# Patient Record
Sex: FEMALE | Race: BLACK OR AFRICAN AMERICAN | NOT HISPANIC OR LATINO
[De-identification: names, ages, dates, MRNs, and addresses within clinical notes are randomized per-mention and may not be internally consistent; named-entity substitution may affect disease eponyms.]

---

## 2017-11-27 ENCOUNTER — TRANSCRIPTION ENCOUNTER (OUTPATIENT)
Age: 27
End: 2017-11-27

## 2018-06-29 ENCOUNTER — APPOINTMENT (OUTPATIENT)
Dept: OBGYN | Facility: CLINIC | Age: 28
End: 2018-06-29

## 2018-07-06 ENCOUNTER — APPOINTMENT (OUTPATIENT)
Dept: OBGYN | Facility: CLINIC | Age: 28
End: 2018-07-06
Payer: COMMERCIAL

## 2018-07-06 VITALS
WEIGHT: 228 LBS | DIASTOLIC BLOOD PRESSURE: 80 MMHG | BODY MASS INDEX: 36.64 KG/M2 | SYSTOLIC BLOOD PRESSURE: 120 MMHG | HEIGHT: 66 IN

## 2018-07-06 DIAGNOSIS — Z78.9 OTHER SPECIFIED HEALTH STATUS: ICD-10-CM

## 2018-07-06 DIAGNOSIS — Z80.3 FAMILY HISTORY OF MALIGNANT NEOPLASM OF BREAST: ICD-10-CM

## 2018-07-06 DIAGNOSIS — Z80.42 FAMILY HISTORY OF MALIGNANT NEOPLASM OF PROSTATE: ICD-10-CM

## 2018-07-06 PROCEDURE — 99385 PREV VISIT NEW AGE 18-39: CPT | Mod: 25

## 2018-07-06 PROCEDURE — 76815 OB US LIMITED FETUS(S): CPT

## 2018-07-09 LAB
C TRACH RRNA SPEC QL NAA+PROBE: NOT DETECTED
CANDIDA VAG CYTO: NOT DETECTED
G VAGINALIS+PREV SP MTYP VAG QL MICRO: NOT DETECTED
HPV HIGH+LOW RISK DNA PNL CVX: NOT DETECTED
N GONORRHOEA RRNA SPEC QL NAA+PROBE: NOT DETECTED
SOURCE AMPLIFICATION: NORMAL
T VAGINALIS VAG QL WET PREP: NOT DETECTED

## 2018-07-12 LAB — CYTOLOGY CVX/VAG DOC THIN PREP: NORMAL

## 2018-07-20 ENCOUNTER — APPOINTMENT (OUTPATIENT)
Dept: OBGYN | Facility: CLINIC | Age: 28
End: 2018-07-20
Payer: COMMERCIAL

## 2018-07-20 VITALS
DIASTOLIC BLOOD PRESSURE: 80 MMHG | HEIGHT: 66 IN | WEIGHT: 229 LBS | BODY MASS INDEX: 36.8 KG/M2 | SYSTOLIC BLOOD PRESSURE: 102 MMHG

## 2018-07-20 PROCEDURE — 0501F PRENATAL FLOW SHEET: CPT

## 2018-07-23 LAB
ABO + RH PNL BLD: NORMAL
BACTERIA UR CULT: NORMAL
BASOPHILS # BLD AUTO: 0.02 K/UL
BASOPHILS NFR BLD AUTO: 0.2 %
BLD GP AB SCN SERPL QL: NORMAL
CMV IGG SERPL QL: 8.6 U/ML
CMV IGG SERPL-IMP: POSITIVE
CMV IGM SERPL QL: <8 AU/ML
CMV IGM SERPL QL: NEGATIVE
EOSINOPHIL # BLD AUTO: 0.11 K/UL
EOSINOPHIL NFR BLD AUTO: 1.3 %
HBV SURFACE AG SER QL: NONREACTIVE
HCT VFR BLD CALC: 38.3 %
HCV AB SER QL: NONREACTIVE
HCV S/CO RATIO: 0.09 S/CO
HGB BLD-MCNC: 10.9 G/DL
HIV1+2 AB SPEC QL IA.RAPID: NONREACTIVE
IMM GRANULOCYTES NFR BLD AUTO: 0.6 %
LEAD BLD-MCNC: <1 UG/DL
LYMPHOCYTES # BLD AUTO: 2 K/UL
LYMPHOCYTES NFR BLD AUTO: 23 %
M TB IFN-G BLD-IMP: NEGATIVE
MAN DIFF?: NORMAL
MCHC RBC-ENTMCNC: 26 PG
MCHC RBC-ENTMCNC: 28.5 GM/DL
MCV RBC AUTO: 91.2 FL
MONOCYTES # BLD AUTO: 0.64 K/UL
MONOCYTES NFR BLD AUTO: 7.4 %
NEUTROPHILS # BLD AUTO: 5.88 K/UL
NEUTROPHILS NFR BLD AUTO: 67.5 %
PLATELET # BLD AUTO: 193 K/UL
QUANTIFERON TB PLUS MITOGEN MINUS NIL: >10 IU/ML
QUANTIFERON TB PLUS NIL: 0.02 IU/ML
QUANTIFERON TB PLUS TB1 MINUS NIL: 0 IU/ML
QUANTIFERON TB PLUS TB2 MINUS NIL: 0 IU/ML
RBC # BLD: 4.2 M/UL
RBC # FLD: 12.9 %
RUBV IGG FLD-ACNC: 5.3 INDEX
RUBV IGG SER-IMP: POSITIVE
TSH SERPL-ACNC: 0.73 UIU/ML
VZV AB TITR SER: POSITIVE
VZV IGG SER IF-ACNC: 3010 INDEX
WBC # FLD AUTO: 8.7 K/UL

## 2018-07-25 LAB
B19V IGG SER QL IA: 7.3 INDEX
B19V IGG+IGM SER-IMP: NORMAL
B19V IGG+IGM SER-IMP: POSITIVE
B19V IGM FLD-ACNC: 0.3 INDEX
B19V IGM SER-ACNC: NEGATIVE
HGB A MFR BLD: 97.3 %
HGB A2 MFR BLD: 2.7 %
HGB FRACT BLD-IMP: NORMAL

## 2018-07-28 ENCOUNTER — TRANSCRIPTION ENCOUNTER (OUTPATIENT)
Age: 28
End: 2018-07-28

## 2018-07-31 LAB
AR GENE MUT ANL BLD/T: NEGATIVE
CFTR MUT TESTED BLD/T: NEGATIVE
FMR1 GENE MUT ANL BLD/T: NORMAL

## 2018-08-02 ENCOUNTER — LABORATORY RESULT (OUTPATIENT)
Age: 28
End: 2018-08-02

## 2018-08-02 ENCOUNTER — APPOINTMENT (OUTPATIENT)
Dept: ANTEPARTUM | Facility: CLINIC | Age: 28
End: 2018-08-02
Payer: COMMERCIAL

## 2018-08-02 ENCOUNTER — ASOB RESULT (OUTPATIENT)
Age: 28
End: 2018-08-02

## 2018-08-02 PROCEDURE — 36416 COLLJ CAPILLARY BLOOD SPEC: CPT

## 2018-08-02 PROCEDURE — 76813 OB US NUCHAL MEAS 1 GEST: CPT

## 2018-08-02 PROCEDURE — 76801 OB US < 14 WKS SINGLE FETUS: CPT

## 2018-08-17 PROBLEM — N94.89 SUPPRESSION OF MENSES: Status: RESOLVED | Noted: 2018-07-06 | Resolved: 2018-08-17

## 2018-08-17 PROBLEM — Z01.419 ENCOUNTER FOR CERVICAL PAP SMEAR WITH PELVIC EXAM: Status: RESOLVED | Noted: 2018-07-06 | Resolved: 2018-08-17

## 2018-08-17 PROBLEM — Z34.03 ENCOUNTER FOR PRENATAL CARE IN THIRD TRIMESTER OF FIRST PREGNANCY: Status: RESOLVED | Noted: 2018-07-20 | Resolved: 2018-08-17

## 2018-08-20 ENCOUNTER — APPOINTMENT (OUTPATIENT)
Dept: OBGYN | Facility: CLINIC | Age: 28
End: 2018-08-20
Payer: COMMERCIAL

## 2018-08-20 VITALS
DIASTOLIC BLOOD PRESSURE: 76 MMHG | HEIGHT: 63.5 IN | BODY MASS INDEX: 40.6 KG/M2 | WEIGHT: 232 LBS | SYSTOLIC BLOOD PRESSURE: 110 MMHG

## 2018-08-20 DIAGNOSIS — Z34.01 ENCOUNTER FOR SUPERVISION OF NORMAL FIRST PREGNANCY, FIRST TRIMESTER: ICD-10-CM

## 2018-08-20 DIAGNOSIS — Z01.419 ENCOUNTER FOR GYNECOLOGICAL EXAMINATION (GENERAL) (ROUTINE) W/OUT ABNORMAL FINDINGS: ICD-10-CM

## 2018-08-20 DIAGNOSIS — N94.89 OTHER SPECIFIED CONDITIONS ASSOCIATED WITH FEMALE GENITAL ORGANS AND MENSTRUAL CYCLE: ICD-10-CM

## 2018-08-20 DIAGNOSIS — Z34.03 ENCOUNTER FOR SUPERVISION OF NORMAL FIRST PREGNANCY, THIRD TRIMESTER: ICD-10-CM

## 2018-08-20 PROCEDURE — 0502F SUBSEQUENT PRENATAL CARE: CPT

## 2018-08-23 LAB
1ST TRIMESTER DATA: NORMAL
2ND TRIMESTER DATA: NORMAL
AFP PNL SERPL: NORMAL
AFP SERPL-ACNC: NORMAL
AFP SERPL-ACNC: NORMAL
B-HCG FREE SERPL-MCNC: NORMAL
CLINICAL BIOCHEMIST REVIEW: NORMAL
FREE BETA HCG 1ST TRIMESTER: NORMAL
INHIBIN A SERPL-MCNC: NORMAL
NOTES NTD: NORMAL
NT: NORMAL
PAPP-A SERPL-ACNC: NORMAL
U ESTRIOL SERPL-SCNC: NORMAL

## 2018-09-20 ENCOUNTER — APPOINTMENT (OUTPATIENT)
Dept: OBGYN | Facility: CLINIC | Age: 28
End: 2018-09-20
Payer: COMMERCIAL

## 2018-09-20 VITALS — WEIGHT: 234 LBS | SYSTOLIC BLOOD PRESSURE: 110 MMHG | DIASTOLIC BLOOD PRESSURE: 70 MMHG | BODY MASS INDEX: 40.8 KG/M2

## 2018-09-20 PROCEDURE — 0502F SUBSEQUENT PRENATAL CARE: CPT

## 2018-09-26 ENCOUNTER — ASOB RESULT (OUTPATIENT)
Age: 28
End: 2018-09-26

## 2018-09-26 ENCOUNTER — APPOINTMENT (OUTPATIENT)
Dept: ANTEPARTUM | Facility: CLINIC | Age: 28
End: 2018-09-26
Payer: COMMERCIAL

## 2018-09-26 PROCEDURE — 76817 TRANSVAGINAL US OBSTETRIC: CPT | Mod: 59

## 2018-09-26 PROCEDURE — 76811 OB US DETAILED SNGL FETUS: CPT

## 2018-10-18 ENCOUNTER — APPOINTMENT (OUTPATIENT)
Dept: OBGYN | Facility: CLINIC | Age: 28
End: 2018-10-18
Payer: COMMERCIAL

## 2018-10-18 VITALS
BODY MASS INDEX: 41.12 KG/M2 | DIASTOLIC BLOOD PRESSURE: 60 MMHG | HEIGHT: 63.5 IN | WEIGHT: 235 LBS | SYSTOLIC BLOOD PRESSURE: 120 MMHG

## 2018-10-18 PROCEDURE — 0502F SUBSEQUENT PRENATAL CARE: CPT

## 2018-11-07 ENCOUNTER — OUTPATIENT (OUTPATIENT)
Dept: OUTPATIENT SERVICES | Facility: HOSPITAL | Age: 28
LOS: 1 days | End: 2018-11-07
Payer: COMMERCIAL

## 2018-11-07 ENCOUNTER — EMERGENCY (EMERGENCY)
Facility: HOSPITAL | Age: 28
LOS: 0 days | Discharge: ANOTHER HOSPITAL | End: 2018-11-07
Attending: EMERGENCY MEDICINE
Payer: COMMERCIAL

## 2018-11-07 VITALS
TEMPERATURE: 99 F | HEART RATE: 112 BPM | OXYGEN SATURATION: 97 % | HEIGHT: 64 IN | SYSTOLIC BLOOD PRESSURE: 119 MMHG | RESPIRATION RATE: 18 BRPM | DIASTOLIC BLOOD PRESSURE: 78 MMHG | WEIGHT: 229.94 LBS

## 2018-11-07 DIAGNOSIS — Z3A.00 WEEKS OF GESTATION OF PREGNANCY NOT SPECIFIED: ICD-10-CM

## 2018-11-07 DIAGNOSIS — O26.899 OTHER SPECIFIED PREGNANCY RELATED CONDITIONS, UNSPECIFIED TRIMESTER: ICD-10-CM

## 2018-11-07 LAB
ALBUMIN SERPL ELPH-MCNC: 2.9 G/DL — LOW (ref 3.3–5)
ALP SERPL-CCNC: 141 U/L — HIGH (ref 40–120)
ALT FLD-CCNC: 45 U/L — SIGNIFICANT CHANGE UP (ref 12–78)
ANION GAP SERPL CALC-SCNC: 11 MMOL/L — SIGNIFICANT CHANGE UP (ref 5–17)
APPEARANCE UR: CLEAR — SIGNIFICANT CHANGE UP
APTT BLD: 28 SEC — LOW (ref 28.5–37)
AST SERPL-CCNC: 17 U/L — SIGNIFICANT CHANGE UP (ref 15–37)
BACTERIA # UR AUTO: ABNORMAL
BASOPHILS # BLD AUTO: 0.04 K/UL — SIGNIFICANT CHANGE UP (ref 0–0.2)
BASOPHILS NFR BLD AUTO: 0.3 % — SIGNIFICANT CHANGE UP (ref 0–2)
BILIRUB SERPL-MCNC: 0.3 MG/DL — SIGNIFICANT CHANGE UP (ref 0.2–1.2)
BILIRUB UR-MCNC: NEGATIVE — SIGNIFICANT CHANGE UP
BUN SERPL-MCNC: 7 MG/DL — SIGNIFICANT CHANGE UP (ref 7–23)
CALCIUM SERPL-MCNC: 9 MG/DL — SIGNIFICANT CHANGE UP (ref 8.5–10.1)
CHLORIDE SERPL-SCNC: 109 MMOL/L — HIGH (ref 96–108)
CO2 SERPL-SCNC: 21 MMOL/L — LOW (ref 22–31)
COLOR SPEC: YELLOW — SIGNIFICANT CHANGE UP
CREAT SERPL-MCNC: 0.53 MG/DL — SIGNIFICANT CHANGE UP (ref 0.5–1.3)
DIFF PNL FLD: NEGATIVE — SIGNIFICANT CHANGE UP
EOSINOPHIL # BLD AUTO: 0.16 K/UL — SIGNIFICANT CHANGE UP (ref 0–0.5)
EOSINOPHIL NFR BLD AUTO: 1.1 % — SIGNIFICANT CHANGE UP (ref 0–6)
EPI CELLS # UR: ABNORMAL
GLUCOSE SERPL-MCNC: 96 MG/DL — SIGNIFICANT CHANGE UP (ref 70–99)
GLUCOSE UR QL: NEGATIVE MG/DL — SIGNIFICANT CHANGE UP
HCG SERPL-ACNC: 6289 MIU/ML — HIGH
HCT VFR BLD CALC: 32.6 % — LOW (ref 34.5–45)
HGB BLD-MCNC: 11 G/DL — LOW (ref 11.5–15.5)
IMM GRANULOCYTES NFR BLD AUTO: 1.2 % — SIGNIFICANT CHANGE UP (ref 0–1.5)
INR BLD: 0.98 RATIO — SIGNIFICANT CHANGE UP (ref 0.88–1.16)
KETONES UR-MCNC: ABNORMAL
LEUKOCYTE ESTERASE UR-ACNC: NEGATIVE — SIGNIFICANT CHANGE UP
LIDOCAIN IGE QN: 173 U/L — SIGNIFICANT CHANGE UP (ref 73–393)
LYMPHOCYTES # BLD AUTO: 14.6 % — SIGNIFICANT CHANGE UP (ref 13–44)
LYMPHOCYTES # BLD AUTO: 2.05 K/UL — SIGNIFICANT CHANGE UP (ref 1–3.3)
MCHC RBC-ENTMCNC: 30.6 PG — SIGNIFICANT CHANGE UP (ref 27–34)
MCHC RBC-ENTMCNC: 33.7 GM/DL — SIGNIFICANT CHANGE UP (ref 32–36)
MCV RBC AUTO: 90.8 FL — SIGNIFICANT CHANGE UP (ref 80–100)
MONOCYTES # BLD AUTO: 0.93 K/UL — HIGH (ref 0–0.9)
MONOCYTES NFR BLD AUTO: 6.6 % — SIGNIFICANT CHANGE UP (ref 2–14)
NEUTROPHILS # BLD AUTO: 10.69 K/UL — HIGH (ref 1.8–7.4)
NEUTROPHILS NFR BLD AUTO: 76.2 % — SIGNIFICANT CHANGE UP (ref 43–77)
NITRITE UR-MCNC: NEGATIVE — SIGNIFICANT CHANGE UP
NRBC # BLD: 0 /100 WBCS — SIGNIFICANT CHANGE UP (ref 0–0)
PH UR: 6.5 — SIGNIFICANT CHANGE UP (ref 5–8)
PLATELET # BLD AUTO: 267 K/UL — SIGNIFICANT CHANGE UP (ref 150–400)
POTASSIUM SERPL-MCNC: 3.6 MMOL/L — SIGNIFICANT CHANGE UP (ref 3.5–5.3)
POTASSIUM SERPL-SCNC: 3.6 MMOL/L — SIGNIFICANT CHANGE UP (ref 3.5–5.3)
PROT SERPL-MCNC: 7.3 GM/DL — SIGNIFICANT CHANGE UP (ref 6–8.3)
PROT UR-MCNC: 15 MG/DL
PROTHROM AB SERPL-ACNC: 11 SEC — SIGNIFICANT CHANGE UP (ref 10–12.9)
RBC # BLD: 3.59 M/UL — LOW (ref 3.8–5.2)
RBC # FLD: 12.7 % — SIGNIFICANT CHANGE UP (ref 10.3–14.5)
SODIUM SERPL-SCNC: 141 MMOL/L — SIGNIFICANT CHANGE UP (ref 135–145)
SP GR SPEC: 1.01 — SIGNIFICANT CHANGE UP (ref 1.01–1.02)
UROBILINOGEN FLD QL: NEGATIVE MG/DL — SIGNIFICANT CHANGE UP
WBC # BLD: 14.04 K/UL — HIGH (ref 3.8–10.5)
WBC # FLD AUTO: 14.04 K/UL — HIGH (ref 3.8–10.5)
WBC UR QL: SIGNIFICANT CHANGE UP

## 2018-11-07 PROCEDURE — G0463: CPT

## 2018-11-07 PROCEDURE — 99285 EMERGENCY DEPT VISIT HI MDM: CPT | Mod: 25

## 2018-11-07 PROCEDURE — 59025 FETAL NON-STRESS TEST: CPT | Mod: 26

## 2018-11-07 PROCEDURE — 59025 FETAL NON-STRESS TEST: CPT

## 2018-11-07 NOTE — ED PROVIDER NOTE - MEDICAL DECISION MAKING DETAILS
Pt w abd pain in pregnancy, concern for early labor, called transfer center, discussed w Ob, Dr. Juan Elizondo accepting pt transfer to L+D for eval.

## 2018-11-07 NOTE — ED ADULT NURSE NOTE - NS PRO AD BILL OF RIGHTS
Yes
GOAL: Pt will improve static/dynamic standing balance by 1/2 grade to improve level of independence with mobility skills and ADLs in 2 weeks.

## 2018-11-07 NOTE — ED PROVIDER NOTE - PHYSICAL EXAMINATION
Gen: Alert, NAD  Head: NC, AT, EOMI, normal lids/conjunctiva  ENT: normal hearing, patent oropharynx, MMM  Neck: supple, no tenderness/meningismus, FROM, Trachea midline  Pulm: Bilateral clear BS, normal resp effort, no wheeze/stridor/retractions  CV: RRR, no M/R/G, +dist pulses  Abd: gravid, ND, +BS, no guarding/rebound tenderness, bedside sono: +fetal movement,   Mskel: no edema/erythema/cyanosis  Skin: no rash  Neuro: no sensory/motor deficits Gen: Alert, NAD  Head: NC, AT, EOMI, normal lids/conjunctiva  ENT: normal hearing, patent oropharynx, MMM  Neck: supple, no tenderness/meningismus, FROM, Trachea midline  Pulm: Bilateral clear BS, normal resp effort, no wheeze/stridor/retractions  CV: RRR, no M/R/G, +dist pulses  Abd: gravid, ND, +BS, no guarding/rebound tenderness, bedside sono: +fetal movement, -133  Mskel: no edema/erythema/cyanosis  Skin: no rash  Neuro: no sensory/motor deficits

## 2018-11-07 NOTE — ED ADULT NURSE NOTE - OBJECTIVE STATEMENT
Patient presents in ED G2 , 26/40 gestation , complaining of generalized abdominal cramping 9/10, lasting a few seconds. pains intermittent. Patient confirms that she is getting frequent fetal movements. Patient vomited x 2 times. Denies any discharge.

## 2018-11-07 NOTE — ED PROVIDER NOTE - OBJECTIVE STATEMENT
Pertinent PMH/PSH/FHx/SHx and Review of Systems contained within:    27yo F w PMH of asthma, no PSH,  about 26wks preg (MARLIN 19) presents to ED for eval of cramping abd pain.  Pt states she noted sx from about 11pm, initially upper abd pain, now c/o lower cramping.  Denies trauma, fever, chills, CP, SOB, vaginal discharge/bleeding.  Ob- Dr. Henriquez    No fever/chills, No photophobia/eye pain/changes in vision, No ear pain/sore throat/dysphagia, No chest pain/palpitations, no SOB/cough/wheeze/stridor, +abdominal pain, no dysuria/frequency/discharge, No neck/back pain, no rash, no changes in neurological status/function.

## 2018-11-08 DIAGNOSIS — O99.519 DISEASES OF THE RESPIRATORY SYSTEM COMPLICATING PREGNANCY, UNSPECIFIED TRIMESTER: ICD-10-CM

## 2018-11-08 DIAGNOSIS — O99.89 OTHER SPECIFIED DISEASES AND CONDITIONS COMPLICATING PREGNANCY, CHILDBIRTH AND THE PUERPERIUM: ICD-10-CM

## 2018-11-08 DIAGNOSIS — Z3A.00 WEEKS OF GESTATION OF PREGNANCY NOT SPECIFIED: ICD-10-CM

## 2018-11-08 DIAGNOSIS — J45.909 UNSPECIFIED ASTHMA, UNCOMPLICATED: ICD-10-CM

## 2018-11-08 DIAGNOSIS — R10.9 UNSPECIFIED ABDOMINAL PAIN: ICD-10-CM

## 2018-11-08 LAB
CULTURE RESULTS: SIGNIFICANT CHANGE UP
SPECIMEN SOURCE: SIGNIFICANT CHANGE UP

## 2018-11-12 ENCOUNTER — APPOINTMENT (OUTPATIENT)
Dept: OBGYN | Facility: CLINIC | Age: 28
End: 2018-11-12

## 2018-11-12 PROBLEM — J45.909 UNSPECIFIED ASTHMA, UNCOMPLICATED: Chronic | Status: ACTIVE | Noted: 2018-11-07

## 2018-11-13 ENCOUNTER — APPOINTMENT (OUTPATIENT)
Dept: OBGYN | Facility: CLINIC | Age: 28
End: 2018-11-13
Payer: COMMERCIAL

## 2018-11-13 ENCOUNTER — NON-APPOINTMENT (OUTPATIENT)
Age: 28
End: 2018-11-13

## 2018-11-13 VITALS
HEIGHT: 63.5 IN | DIASTOLIC BLOOD PRESSURE: 78 MMHG | SYSTOLIC BLOOD PRESSURE: 119 MMHG | BODY MASS INDEX: 42 KG/M2 | WEIGHT: 240 LBS

## 2018-11-13 PROCEDURE — 0502F SUBSEQUENT PRENATAL CARE: CPT

## 2018-11-14 LAB
BASOPHILS # BLD AUTO: 0.01 K/UL
BASOPHILS NFR BLD AUTO: 0.1 %
EOSINOPHIL # BLD AUTO: 0.21 K/UL
EOSINOPHIL NFR BLD AUTO: 1.8 %
GLUCOSE 1H P 50 G GLC PO SERPL-MCNC: 123 MG/DL
HCT VFR BLD CALC: 34.3 %
HGB BLD-MCNC: 11.3 G/DL
HIV1+2 AB SPEC QL IA.RAPID: NONREACTIVE
IMM GRANULOCYTES NFR BLD AUTO: 1.2 %
LYMPHOCYTES # BLD AUTO: 2.17 K/UL
LYMPHOCYTES NFR BLD AUTO: 18.1 %
MAN DIFF?: NORMAL
MCHC RBC-ENTMCNC: 31 PG
MCHC RBC-ENTMCNC: 32.9 GM/DL
MCV RBC AUTO: 94.2 FL
MONOCYTES # BLD AUTO: 0.54 K/UL
MONOCYTES NFR BLD AUTO: 4.5 %
NEUTROPHILS # BLD AUTO: 8.9 K/UL
NEUTROPHILS NFR BLD AUTO: 74.3 %
PLATELET # BLD AUTO: 277 K/UL
RBC # BLD: 3.64 M/UL
RBC # FLD: 13.5 %
WBC # FLD AUTO: 11.97 K/UL

## 2018-11-20 ENCOUNTER — APPOINTMENT (OUTPATIENT)
Dept: ANTEPARTUM | Facility: CLINIC | Age: 28
End: 2018-11-20
Payer: COMMERCIAL

## 2018-11-20 ENCOUNTER — ASOB RESULT (OUTPATIENT)
Age: 28
End: 2018-11-20

## 2018-11-20 PROCEDURE — 76816 OB US FOLLOW-UP PER FETUS: CPT

## 2018-11-27 ENCOUNTER — CLINICAL ADVICE (OUTPATIENT)
Age: 28
End: 2018-11-27

## 2018-11-28 ENCOUNTER — CLINICAL ADVICE (OUTPATIENT)
Age: 28
End: 2018-11-28

## 2018-11-28 ENCOUNTER — OUTPATIENT (OUTPATIENT)
Dept: OUTPATIENT SERVICES | Facility: HOSPITAL | Age: 28
LOS: 1 days | End: 2018-11-28
Payer: COMMERCIAL

## 2018-11-28 DIAGNOSIS — Z3A.00 WEEKS OF GESTATION OF PREGNANCY NOT SPECIFIED: ICD-10-CM

## 2018-11-28 DIAGNOSIS — O26.899 OTHER SPECIFIED PREGNANCY RELATED CONDITIONS, UNSPECIFIED TRIMESTER: ICD-10-CM

## 2018-11-28 PROCEDURE — 59025 FETAL NON-STRESS TEST: CPT | Mod: 26

## 2018-11-28 PROCEDURE — 59025 FETAL NON-STRESS TEST: CPT

## 2018-11-28 PROCEDURE — G0463: CPT

## 2018-11-28 RX ORDER — MAGNESIUM SULFATE 500 MG/ML
1 VIAL (ML) INJECTION ONCE
Qty: 0 | Refills: 0 | Status: DISCONTINUED | OUTPATIENT
Start: 2018-11-28 | End: 2018-12-13

## 2018-12-09 PROBLEM — Z34.02 ENCOUNTER FOR PRENATAL CARE IN SECOND TRIMESTER OF FIRST PREGNANCY: Status: RESOLVED | Noted: 2018-07-20 | Resolved: 2018-12-09

## 2018-12-10 ENCOUNTER — APPOINTMENT (OUTPATIENT)
Dept: OBGYN | Facility: CLINIC | Age: 28
End: 2018-12-10
Payer: COMMERCIAL

## 2018-12-10 ENCOUNTER — MED ADMIN CHARGE (OUTPATIENT)
Age: 28
End: 2018-12-10

## 2018-12-10 VITALS
BODY MASS INDEX: 42.19 KG/M2 | HEIGHT: 63.5 IN | DIASTOLIC BLOOD PRESSURE: 69 MMHG | SYSTOLIC BLOOD PRESSURE: 129 MMHG | WEIGHT: 241.13 LBS

## 2018-12-10 DIAGNOSIS — Z34.02 ENCOUNTER FOR SUPERVISION OF NORMAL FIRST PREGNANCY, SECOND TRIMESTER: ICD-10-CM

## 2018-12-10 LAB
URINE ALBUMIN/PROTEIN: NORMAL
URINE GLUCOSE: NORMAL
URINE KETONES: NORMAL

## 2018-12-10 PROCEDURE — 90471 IMMUNIZATION ADMIN: CPT

## 2018-12-10 PROCEDURE — 0502F SUBSEQUENT PRENATAL CARE: CPT

## 2018-12-10 PROCEDURE — 90715 TDAP VACCINE 7 YRS/> IM: CPT

## 2018-12-12 LAB
CANDIDA VAG CYTO: NOT DETECTED
G VAGINALIS+PREV SP MTYP VAG QL MICRO: NOT DETECTED
T VAGINALIS VAG QL WET PREP: NOT DETECTED

## 2018-12-13 ENCOUNTER — TRANSCRIPTION ENCOUNTER (OUTPATIENT)
Age: 28
End: 2018-12-13

## 2018-12-24 ENCOUNTER — APPOINTMENT (OUTPATIENT)
Dept: OBGYN | Facility: CLINIC | Age: 28
End: 2018-12-24
Payer: COMMERCIAL

## 2018-12-24 VITALS
HEIGHT: 63.5 IN | DIASTOLIC BLOOD PRESSURE: 85 MMHG | BODY MASS INDEX: 42.7 KG/M2 | SYSTOLIC BLOOD PRESSURE: 138 MMHG | WEIGHT: 244 LBS

## 2018-12-24 PROCEDURE — 0502F SUBSEQUENT PRENATAL CARE: CPT

## 2018-12-31 ENCOUNTER — NON-APPOINTMENT (OUTPATIENT)
Age: 28
End: 2018-12-31

## 2018-12-31 ENCOUNTER — APPOINTMENT (OUTPATIENT)
Dept: OBGYN | Facility: CLINIC | Age: 28
End: 2018-12-31
Payer: COMMERCIAL

## 2018-12-31 VITALS
DIASTOLIC BLOOD PRESSURE: 70 MMHG | WEIGHT: 244 LBS | SYSTOLIC BLOOD PRESSURE: 132 MMHG | HEIGHT: 63.5 IN | BODY MASS INDEX: 42.7 KG/M2

## 2018-12-31 PROCEDURE — 0502F SUBSEQUENT PRENATAL CARE: CPT

## 2019-01-07 ENCOUNTER — CLINICAL ADVICE (OUTPATIENT)
Age: 29
End: 2019-01-07

## 2019-01-14 ENCOUNTER — INPATIENT (INPATIENT)
Facility: HOSPITAL | Age: 29
LOS: 5 days | Discharge: HOME | End: 2019-01-20
Attending: OBSTETRICS & GYNECOLOGY | Admitting: OBSTETRICS & GYNECOLOGY
Payer: COMMERCIAL

## 2019-01-14 ENCOUNTER — TRANSCRIPTION ENCOUNTER (OUTPATIENT)
Age: 29
End: 2019-01-14

## 2019-01-14 ENCOUNTER — OTHER (OUTPATIENT)
Age: 29
End: 2019-01-14

## 2019-01-14 VITALS — WEIGHT: 293 LBS | HEIGHT: 63 IN

## 2019-01-14 DIAGNOSIS — Z3A.00 WEEKS OF GESTATION OF PREGNANCY NOT SPECIFIED: ICD-10-CM

## 2019-01-14 DIAGNOSIS — O26.899 OTHER SPECIFIED PREGNANCY RELATED CONDITIONS, UNSPECIFIED TRIMESTER: ICD-10-CM

## 2019-01-14 DIAGNOSIS — Z34.80 ENCOUNTER FOR SUPERVISION OF OTHER NORMAL PREGNANCY, UNSPECIFIED TRIMESTER: ICD-10-CM

## 2019-01-14 LAB
ALBUMIN SERPL ELPH-MCNC: 3.7 G/DL — SIGNIFICANT CHANGE UP (ref 3.3–5)
ALP SERPL-CCNC: 219 U/L — HIGH (ref 40–120)
ALT FLD-CCNC: 60 U/L — HIGH (ref 10–45)
ANION GAP SERPL CALC-SCNC: 15 MMOL/L — SIGNIFICANT CHANGE UP (ref 5–17)
APPEARANCE UR: ABNORMAL
APTT BLD: 25.6 SEC — LOW (ref 27.5–36.3)
AST SERPL-CCNC: 41 U/L — HIGH (ref 10–40)
BACTERIA # UR AUTO: ABNORMAL
BASOPHILS # BLD AUTO: 0.1 K/UL — SIGNIFICANT CHANGE UP (ref 0–0.2)
BASOPHILS NFR BLD AUTO: 0.5 % — SIGNIFICANT CHANGE UP (ref 0–2)
BILIRUB SERPL-MCNC: 0.3 MG/DL — SIGNIFICANT CHANGE UP (ref 0.2–1.2)
BILIRUB UR-MCNC: NEGATIVE — SIGNIFICANT CHANGE UP
BUN SERPL-MCNC: 6 MG/DL — LOW (ref 7–23)
CALCIUM SERPL-MCNC: 9.5 MG/DL — SIGNIFICANT CHANGE UP (ref 8.4–10.5)
CHLORIDE SERPL-SCNC: 105 MMOL/L — SIGNIFICANT CHANGE UP (ref 96–108)
CO2 SERPL-SCNC: 20 MMOL/L — LOW (ref 22–31)
COLOR SPEC: YELLOW — SIGNIFICANT CHANGE UP
CREAT SERPL-MCNC: 0.64 MG/DL — SIGNIFICANT CHANGE UP (ref 0.5–1.3)
DIFF PNL FLD: NEGATIVE — SIGNIFICANT CHANGE UP
EOSINOPHIL # BLD AUTO: 0.1 K/UL — SIGNIFICANT CHANGE UP (ref 0–0.5)
EOSINOPHIL NFR BLD AUTO: 1.2 % — SIGNIFICANT CHANGE UP (ref 0–6)
EPI CELLS # UR: 9 /HPF — HIGH
FIBRINOGEN PPP-MCNC: 824 MG/DL — HIGH (ref 350–510)
GLUCOSE SERPL-MCNC: 86 MG/DL — SIGNIFICANT CHANGE UP (ref 70–99)
GLUCOSE UR QL: NEGATIVE — SIGNIFICANT CHANGE UP
HCT VFR BLD CALC: 33 % — LOW (ref 34.5–45)
HGB BLD-MCNC: 12 G/DL — SIGNIFICANT CHANGE UP (ref 11.5–15.5)
HYALINE CASTS # UR AUTO: 7 /LPF — HIGH (ref 0–2)
INR BLD: 0.89 RATIO — SIGNIFICANT CHANGE UP (ref 0.88–1.16)
KETONES UR-MCNC: NEGATIVE — SIGNIFICANT CHANGE UP
LDH SERPL L TO P-CCNC: 209 U/L — SIGNIFICANT CHANGE UP (ref 50–242)
LEUKOCYTE ESTERASE UR-ACNC: ABNORMAL
LYMPHOCYTES # BLD AUTO: 2.4 K/UL — SIGNIFICANT CHANGE UP (ref 1–3.3)
LYMPHOCYTES # BLD AUTO: 23.2 % — SIGNIFICANT CHANGE UP (ref 13–44)
MCHC RBC-ENTMCNC: 31.5 PG — SIGNIFICANT CHANGE UP (ref 27–34)
MCHC RBC-ENTMCNC: 36.3 GM/DL — HIGH (ref 32–36)
MCV RBC AUTO: 86.8 FL — SIGNIFICANT CHANGE UP (ref 80–100)
MONOCYTES # BLD AUTO: 0.9 K/UL — SIGNIFICANT CHANGE UP (ref 0–0.9)
MONOCYTES NFR BLD AUTO: 8.6 % — SIGNIFICANT CHANGE UP (ref 2–14)
NEUTROPHILS # BLD AUTO: 6.9 K/UL — SIGNIFICANT CHANGE UP (ref 1.8–7.4)
NEUTROPHILS NFR BLD AUTO: 66.5 % — SIGNIFICANT CHANGE UP (ref 43–77)
NITRITE UR-MCNC: NEGATIVE — SIGNIFICANT CHANGE UP
PH UR: 6.5 — SIGNIFICANT CHANGE UP (ref 5–8)
PLATELET # BLD AUTO: 265 K/UL — SIGNIFICANT CHANGE UP (ref 150–400)
POTASSIUM SERPL-MCNC: 4 MMOL/L — SIGNIFICANT CHANGE UP (ref 3.5–5.3)
POTASSIUM SERPL-SCNC: 4 MMOL/L — SIGNIFICANT CHANGE UP (ref 3.5–5.3)
PROT SERPL-MCNC: 6.8 G/DL — SIGNIFICANT CHANGE UP (ref 6–8.3)
PROT UR-MCNC: ABNORMAL
PROTHROM AB SERPL-ACNC: 10.2 SEC — SIGNIFICANT CHANGE UP (ref 10–12.9)
RBC # BLD: 3.81 M/UL — SIGNIFICANT CHANGE UP (ref 3.8–5.2)
RBC # FLD: 13.2 % — SIGNIFICANT CHANGE UP (ref 10.3–14.5)
RBC CASTS # UR COMP ASSIST: 4 /HPF — SIGNIFICANT CHANGE UP (ref 0–4)
SODIUM SERPL-SCNC: 140 MMOL/L — SIGNIFICANT CHANGE UP (ref 135–145)
SP GR SPEC: 1.03 — HIGH (ref 1.01–1.02)
URATE SERPL-MCNC: 6.9 MG/DL — SIGNIFICANT CHANGE UP (ref 2.5–7)
UROBILINOGEN FLD QL: ABNORMAL
WBC # BLD: 10.4 K/UL — SIGNIFICANT CHANGE UP (ref 3.8–10.5)
WBC # FLD AUTO: 10.4 K/UL — SIGNIFICANT CHANGE UP (ref 3.8–10.5)
WBC UR QL: 14 /HPF — HIGH (ref 0–5)

## 2019-01-14 RX ORDER — SODIUM CHLORIDE 9 MG/ML
500 INJECTION, SOLUTION INTRAVENOUS ONCE
Qty: 0 | Refills: 0 | Status: DISCONTINUED | OUTPATIENT
Start: 2019-01-14 | End: 2019-01-15

## 2019-01-14 RX ORDER — ACETAMINOPHEN 500 MG
1000 TABLET ORAL ONCE
Qty: 0 | Refills: 0 | Status: COMPLETED | OUTPATIENT
Start: 2019-01-14 | End: 2019-01-14

## 2019-01-14 RX ORDER — MAGNESIUM SULFATE 500 MG/ML
4 VIAL (ML) INJECTION ONCE
Qty: 0 | Refills: 0 | Status: COMPLETED | OUTPATIENT
Start: 2019-01-14 | End: 2019-01-14

## 2019-01-14 RX ORDER — SODIUM CHLORIDE 9 MG/ML
1000 INJECTION, SOLUTION INTRAVENOUS
Qty: 0 | Refills: 0 | Status: DISCONTINUED | OUTPATIENT
Start: 2019-01-14 | End: 2019-01-15

## 2019-01-14 RX ORDER — OXYTOCIN 10 UNIT/ML
333.33 VIAL (ML) INJECTION
Qty: 20 | Refills: 0 | Status: DISCONTINUED | OUTPATIENT
Start: 2019-01-14 | End: 2019-01-15

## 2019-01-14 RX ORDER — MAGNESIUM SULFATE 500 MG/ML
2 VIAL (ML) INJECTION
Qty: 40 | Refills: 0 | Status: DISCONTINUED | OUTPATIENT
Start: 2019-01-14 | End: 2019-01-16

## 2019-01-14 RX ORDER — SODIUM CHLORIDE 9 MG/ML
1000 INJECTION, SOLUTION INTRAVENOUS ONCE
Qty: 0 | Refills: 0 | Status: COMPLETED | OUTPATIENT
Start: 2019-01-14 | End: 2019-01-14

## 2019-01-14 RX ORDER — CITRIC ACID/SODIUM CITRATE 300-500 MG
15 SOLUTION, ORAL ORAL EVERY 4 HOURS
Qty: 0 | Refills: 0 | Status: DISCONTINUED | OUTPATIENT
Start: 2019-01-14 | End: 2019-01-15

## 2019-01-14 RX ADMIN — Medication 400 MILLIGRAM(S): at 20:00

## 2019-01-14 RX ADMIN — SODIUM CHLORIDE 2000 MILLILITER(S): 9 INJECTION, SOLUTION INTRAVENOUS at 19:30

## 2019-01-14 RX ADMIN — Medication 1000 MILLIGRAM(S): at 21:25

## 2019-01-14 RX ADMIN — SODIUM CHLORIDE 50 MILLILITER(S): 9 INJECTION, SOLUTION INTRAVENOUS at 22:30

## 2019-01-14 RX ADMIN — Medication 12 MILLIGRAM(S): at 23:49

## 2019-01-14 RX ADMIN — SODIUM CHLORIDE 50 MILLILITER(S): 9 INJECTION, SOLUTION INTRAVENOUS at 22:00

## 2019-01-14 RX ADMIN — Medication 50 GM/HR: at 23:11

## 2019-01-14 RX ADMIN — Medication 300 GRAM(S): at 22:30

## 2019-01-14 NOTE — PATIENT PROFILE OB - REASON FOR REFUSAL (REFER PATIENT TO HEALTHCARE PROVIDER FOR FOLLOW-UP):
pt stated she got really sick after the last flu vaccine. pt advised to follow up with primary doctor regarding flu vaccine.

## 2019-01-15 ENCOUNTER — RESULT REVIEW (OUTPATIENT)
Age: 29
End: 2019-01-15

## 2019-01-15 LAB
ALBUMIN SERPL ELPH-MCNC: 3.5 G/DL — SIGNIFICANT CHANGE UP (ref 3.3–5)
ALBUMIN SERPL ELPH-MCNC: 3.9 G/DL — SIGNIFICANT CHANGE UP (ref 3.3–5)
ALP SERPL-CCNC: 213 U/L — HIGH (ref 40–120)
ALP SERPL-CCNC: 256 U/L — HIGH (ref 40–120)
ALT FLD-CCNC: 67 U/L — HIGH (ref 10–45)
ALT FLD-CCNC: 87 U/L — HIGH (ref 10–45)
ANION GAP SERPL CALC-SCNC: 13 MMOL/L — SIGNIFICANT CHANGE UP (ref 5–17)
ANION GAP SERPL CALC-SCNC: 14 MMOL/L — SIGNIFICANT CHANGE UP (ref 5–17)
APTT BLD: 25.1 SEC — LOW (ref 27.5–36.3)
APTT BLD: 25.9 SEC — LOW (ref 27.5–36.3)
AST SERPL-CCNC: 42 U/L — HIGH (ref 10–40)
AST SERPL-CCNC: 61 U/L — HIGH (ref 10–40)
BASOPHILS # BLD AUTO: 0 K/UL — SIGNIFICANT CHANGE UP (ref 0–0.2)
BASOPHILS NFR BLD AUTO: 0.1 % — SIGNIFICANT CHANGE UP (ref 0–2)
BASOPHILS NFR BLD AUTO: 0.1 % — SIGNIFICANT CHANGE UP (ref 0–2)
BASOPHILS NFR BLD AUTO: 0.2 % — SIGNIFICANT CHANGE UP (ref 0–2)
BILIRUB SERPL-MCNC: 0.3 MG/DL — SIGNIFICANT CHANGE UP (ref 0.2–1.2)
BILIRUB SERPL-MCNC: 0.5 MG/DL — SIGNIFICANT CHANGE UP (ref 0.2–1.2)
BLD GP AB SCN SERPL QL: NEGATIVE — SIGNIFICANT CHANGE UP
BLD GP AB SCN SERPL QL: NEGATIVE — SIGNIFICANT CHANGE UP
BUN SERPL-MCNC: 5 MG/DL — LOW (ref 7–23)
BUN SERPL-MCNC: 6 MG/DL — LOW (ref 7–23)
CALCIUM SERPL-MCNC: 9.1 MG/DL — SIGNIFICANT CHANGE UP (ref 8.4–10.5)
CALCIUM SERPL-MCNC: 9.1 MG/DL — SIGNIFICANT CHANGE UP (ref 8.4–10.5)
CHLORIDE SERPL-SCNC: 102 MMOL/L — SIGNIFICANT CHANGE UP (ref 96–108)
CHLORIDE SERPL-SCNC: 105 MMOL/L — SIGNIFICANT CHANGE UP (ref 96–108)
CO2 SERPL-SCNC: 20 MMOL/L — LOW (ref 22–31)
CO2 SERPL-SCNC: 20 MMOL/L — LOW (ref 22–31)
CREAT ?TM UR-MCNC: 269 MG/DL — SIGNIFICANT CHANGE UP
CREAT SERPL-MCNC: 0.59 MG/DL — SIGNIFICANT CHANGE UP (ref 0.5–1.3)
CREAT SERPL-MCNC: 0.68 MG/DL — SIGNIFICANT CHANGE UP (ref 0.5–1.3)
EOSINOPHIL # BLD AUTO: 0 K/UL — SIGNIFICANT CHANGE UP (ref 0–0.5)
EOSINOPHIL # BLD AUTO: 0 K/UL — SIGNIFICANT CHANGE UP (ref 0–0.5)
EOSINOPHIL # BLD AUTO: 0.1 K/UL — SIGNIFICANT CHANGE UP (ref 0–0.5)
EOSINOPHIL NFR BLD AUTO: 0.1 % — SIGNIFICANT CHANGE UP (ref 0–6)
EOSINOPHIL NFR BLD AUTO: 0.1 % — SIGNIFICANT CHANGE UP (ref 0–6)
EOSINOPHIL NFR BLD AUTO: 0.8 % — SIGNIFICANT CHANGE UP (ref 0–6)
FIBRINOGEN PPP-MCNC: 812 MG/DL — SIGNIFICANT CHANGE UP (ref 350–510)
GLUCOSE SERPL-MCNC: 115 MG/DL — HIGH (ref 70–99)
GLUCOSE SERPL-MCNC: 118 MG/DL — HIGH (ref 70–99)
HCT VFR BLD CALC: 30.4 % — LOW (ref 34.5–45)
HCT VFR BLD CALC: 32.6 % — LOW (ref 34.5–45)
HCT VFR BLD CALC: 36 % — SIGNIFICANT CHANGE UP (ref 34.5–45)
HGB BLD-MCNC: 10.6 G/DL — LOW (ref 11.5–15.5)
HGB BLD-MCNC: 11.6 G/DL — SIGNIFICANT CHANGE UP (ref 11.5–15.5)
HGB BLD-MCNC: 12.5 G/DL — SIGNIFICANT CHANGE UP (ref 11.5–15.5)
INR BLD: 0.87 RATIO — LOW (ref 0.88–1.16)
LDH SERPL L TO P-CCNC: 163 U/L — SIGNIFICANT CHANGE UP (ref 50–242)
LDH SERPL L TO P-CCNC: 203 U/L — SIGNIFICANT CHANGE UP (ref 50–242)
LYMPHOCYTES # BLD AUTO: 1.2 K/UL — SIGNIFICANT CHANGE UP (ref 1–3.3)
LYMPHOCYTES # BLD AUTO: 1.5 K/UL — SIGNIFICANT CHANGE UP (ref 1–3.3)
LYMPHOCYTES # BLD AUTO: 1.8 K/UL — SIGNIFICANT CHANGE UP (ref 1–3.3)
LYMPHOCYTES # BLD AUTO: 13 % — SIGNIFICANT CHANGE UP (ref 13–44)
LYMPHOCYTES # BLD AUTO: 16.5 % — SIGNIFICANT CHANGE UP (ref 13–44)
LYMPHOCYTES # BLD AUTO: 6.5 % — LOW (ref 13–44)
MAGNESIUM SERPL-MCNC: 4 MG/DL — HIGH (ref 1.6–2.6)
MAGNESIUM SERPL-MCNC: 5.4 MG/DL — HIGH (ref 1.6–2.6)
MAGNESIUM SERPL-MCNC: 6.3 MG/DL — HIGH (ref 1.6–2.6)
MCHC RBC-ENTMCNC: 30.4 PG — SIGNIFICANT CHANGE UP (ref 27–34)
MCHC RBC-ENTMCNC: 30.7 PG — SIGNIFICANT CHANGE UP (ref 27–34)
MCHC RBC-ENTMCNC: 30.8 PG — SIGNIFICANT CHANGE UP (ref 27–34)
MCHC RBC-ENTMCNC: 34.8 GM/DL — SIGNIFICANT CHANGE UP (ref 32–36)
MCHC RBC-ENTMCNC: 34.8 GM/DL — SIGNIFICANT CHANGE UP (ref 32–36)
MCHC RBC-ENTMCNC: 35.5 GM/DL — SIGNIFICANT CHANGE UP (ref 32–36)
MCV RBC AUTO: 86.9 FL — SIGNIFICANT CHANGE UP (ref 80–100)
MCV RBC AUTO: 87.4 FL — SIGNIFICANT CHANGE UP (ref 80–100)
MCV RBC AUTO: 88.1 FL — SIGNIFICANT CHANGE UP (ref 80–100)
MONOCYTES # BLD AUTO: 0.2 K/UL — SIGNIFICANT CHANGE UP (ref 0–0.9)
MONOCYTES # BLD AUTO: 0.4 K/UL — SIGNIFICANT CHANGE UP (ref 0–0.9)
MONOCYTES # BLD AUTO: 1.4 K/UL — HIGH (ref 0–0.9)
MONOCYTES NFR BLD AUTO: 1.6 % — LOW (ref 2–14)
MONOCYTES NFR BLD AUTO: 3.7 % — SIGNIFICANT CHANGE UP (ref 2–14)
MONOCYTES NFR BLD AUTO: 7.7 % — SIGNIFICANT CHANGE UP (ref 2–14)
NEUTROPHILS # BLD AUTO: 10.1 K/UL — HIGH (ref 1.8–7.4)
NEUTROPHILS # BLD AUTO: 15.7 K/UL — HIGH (ref 1.8–7.4)
NEUTROPHILS # BLD AUTO: 8.8 K/UL — HIGH (ref 1.8–7.4)
NEUTROPHILS NFR BLD AUTO: 78.9 % — HIGH (ref 43–77)
NEUTROPHILS NFR BLD AUTO: 85.1 % — HIGH (ref 43–77)
NEUTROPHILS NFR BLD AUTO: 85.5 % — HIGH (ref 43–77)
PLATELET # BLD AUTO: 246 K/UL — SIGNIFICANT CHANGE UP (ref 150–400)
PLATELET # BLD AUTO: 247 K/UL — SIGNIFICANT CHANGE UP (ref 150–400)
PLATELET # BLD AUTO: 264 K/UL — SIGNIFICANT CHANGE UP (ref 150–400)
POTASSIUM SERPL-MCNC: 3.7 MMOL/L — SIGNIFICANT CHANGE UP (ref 3.5–5.3)
POTASSIUM SERPL-MCNC: 4 MMOL/L — SIGNIFICANT CHANGE UP (ref 3.5–5.3)
POTASSIUM SERPL-SCNC: 3.7 MMOL/L — SIGNIFICANT CHANGE UP (ref 3.5–5.3)
POTASSIUM SERPL-SCNC: 4 MMOL/L — SIGNIFICANT CHANGE UP (ref 3.5–5.3)
PROT ?TM UR-MCNC: 33 MG/DL — HIGH (ref 0–12)
PROT SERPL-MCNC: 6.5 G/DL — SIGNIFICANT CHANGE UP (ref 6–8.3)
PROT SERPL-MCNC: 7.4 G/DL — SIGNIFICANT CHANGE UP (ref 6–8.3)
PROT/CREAT UR-RTO: 0.1 RATIO — SIGNIFICANT CHANGE UP (ref 0–0.2)
PROTHROM AB SERPL-ACNC: 10 SEC — SIGNIFICANT CHANGE UP (ref 10–12.9)
PROTHROM AB SERPL-ACNC: 9.9 SEC — LOW (ref 10–12.9)
PROTHROM AB SERPL-ACNC: 9.9 SEC — LOW (ref 10–12.9)
RBC # BLD: 3.45 M/UL — LOW (ref 3.8–5.2)
RBC # BLD: 3.75 M/UL — LOW (ref 3.8–5.2)
RBC # BLD: 4.12 M/UL — SIGNIFICANT CHANGE UP (ref 3.8–5.2)
RBC # FLD: 13.1 % — SIGNIFICANT CHANGE UP (ref 10.3–14.5)
RBC # FLD: 13.2 % — SIGNIFICANT CHANGE UP (ref 10.3–14.5)
RBC # FLD: 13.3 % — SIGNIFICANT CHANGE UP (ref 10.3–14.5)
RH IG SCN BLD-IMP: POSITIVE — SIGNIFICANT CHANGE UP
RH IG SCN BLD-IMP: POSITIVE — SIGNIFICANT CHANGE UP
SODIUM SERPL-SCNC: 135 MMOL/L — SIGNIFICANT CHANGE UP (ref 135–145)
SODIUM SERPL-SCNC: 139 MMOL/L — SIGNIFICANT CHANGE UP (ref 135–145)
T PALLIDUM AB TITR SER: NEGATIVE — SIGNIFICANT CHANGE UP
URATE SERPL-MCNC: 6.7 MG/DL — SIGNIFICANT CHANGE UP (ref 2.5–7)
URATE SERPL-MCNC: 6.9 MG/DL — SIGNIFICANT CHANGE UP (ref 2.5–7)
WBC # BLD: 11.1 K/UL — HIGH (ref 3.8–10.5)
WBC # BLD: 11.8 K/UL — HIGH (ref 3.8–10.5)
WBC # BLD: 18.4 K/UL — HIGH (ref 3.8–10.5)
WBC # FLD AUTO: 11.1 K/UL — HIGH (ref 3.8–10.5)
WBC # FLD AUTO: 11.8 K/UL — HIGH (ref 3.8–10.5)
WBC # FLD AUTO: 18.4 K/UL — HIGH (ref 3.8–10.5)

## 2019-01-15 PROCEDURE — 88307 TISSUE EXAM BY PATHOLOGIST: CPT | Mod: 26

## 2019-01-15 PROCEDURE — 59400 OBSTETRICAL CARE: CPT

## 2019-01-15 RX ORDER — PRAMOXINE HYDROCHLORIDE 150 MG/15G
1 AEROSOL, FOAM RECTAL EVERY 4 HOURS
Qty: 0 | Refills: 0 | Status: DISCONTINUED | OUTPATIENT
Start: 2019-01-15 | End: 2019-01-15

## 2019-01-15 RX ORDER — AER TRAVELER 0.5 G/1
1 SOLUTION RECTAL; TOPICAL EVERY 4 HOURS
Qty: 0 | Refills: 0 | Status: DISCONTINUED | OUTPATIENT
Start: 2019-01-15 | End: 2019-01-15

## 2019-01-15 RX ORDER — AMPICILLIN TRIHYDRATE 250 MG
1 CAPSULE ORAL EVERY 4 HOURS
Qty: 0 | Refills: 0 | Status: DISCONTINUED | OUTPATIENT
Start: 2019-01-15 | End: 2019-01-16

## 2019-01-15 RX ORDER — ACETAMINOPHEN 500 MG
1000 TABLET ORAL ONCE
Qty: 0 | Refills: 0 | Status: COMPLETED | OUTPATIENT
Start: 2019-01-15 | End: 2019-01-16

## 2019-01-15 RX ORDER — OXYCODONE HYDROCHLORIDE 5 MG/1
5 TABLET ORAL
Qty: 0 | Refills: 0 | Status: DISCONTINUED | OUTPATIENT
Start: 2019-01-15 | End: 2019-01-20

## 2019-01-15 RX ORDER — GLYCERIN ADULT
1 SUPPOSITORY, RECTAL RECTAL AT BEDTIME
Qty: 0 | Refills: 0 | Status: DISCONTINUED | OUTPATIENT
Start: 2019-01-15 | End: 2019-01-20

## 2019-01-15 RX ORDER — OXYTOCIN 10 UNIT/ML
41.67 VIAL (ML) INJECTION
Qty: 20 | Refills: 0 | Status: DISCONTINUED | OUTPATIENT
Start: 2019-01-15 | End: 2019-01-20

## 2019-01-15 RX ORDER — DIPHENHYDRAMINE HCL 50 MG
25 CAPSULE ORAL EVERY 6 HOURS
Qty: 0 | Refills: 0 | Status: DISCONTINUED | OUTPATIENT
Start: 2019-01-15 | End: 2019-01-20

## 2019-01-15 RX ORDER — DIBUCAINE 1 %
1 OINTMENT (GRAM) RECTAL EVERY 4 HOURS
Qty: 0 | Refills: 0 | Status: DISCONTINUED | OUTPATIENT
Start: 2019-01-15 | End: 2019-01-15

## 2019-01-15 RX ORDER — HYDROCORTISONE 1 %
1 OINTMENT (GRAM) TOPICAL EVERY 4 HOURS
Qty: 0 | Refills: 0 | Status: DISCONTINUED | OUTPATIENT
Start: 2019-01-15 | End: 2019-01-20

## 2019-01-15 RX ORDER — SIMETHICONE 80 MG/1
80 TABLET, CHEWABLE ORAL EVERY 6 HOURS
Qty: 0 | Refills: 0 | Status: DISCONTINUED | OUTPATIENT
Start: 2019-01-15 | End: 2019-01-20

## 2019-01-15 RX ORDER — METOCLOPRAMIDE HCL 10 MG
10 TABLET ORAL ONCE
Qty: 0 | Refills: 0 | Status: DISCONTINUED | OUTPATIENT
Start: 2019-01-15 | End: 2019-01-19

## 2019-01-15 RX ORDER — AER TRAVELER 0.5 G/1
1 SOLUTION RECTAL; TOPICAL EVERY 4 HOURS
Qty: 0 | Refills: 0 | Status: DISCONTINUED | OUTPATIENT
Start: 2019-01-15 | End: 2019-01-20

## 2019-01-15 RX ORDER — OXYTOCIN 10 UNIT/ML
41.67 VIAL (ML) INJECTION
Qty: 20 | Refills: 0 | Status: DISCONTINUED | OUTPATIENT
Start: 2019-01-15 | End: 2019-01-19

## 2019-01-15 RX ORDER — MAGNESIUM HYDROXIDE 400 MG/1
30 TABLET, CHEWABLE ORAL
Qty: 0 | Refills: 0 | Status: DISCONTINUED | OUTPATIENT
Start: 2019-01-15 | End: 2019-01-20

## 2019-01-15 RX ORDER — OXYCODONE HYDROCHLORIDE 5 MG/1
5 TABLET ORAL EVERY 4 HOURS
Qty: 0 | Refills: 0 | Status: DISCONTINUED | OUTPATIENT
Start: 2019-01-15 | End: 2019-01-20

## 2019-01-15 RX ORDER — SODIUM CHLORIDE 9 MG/ML
3 INJECTION INTRAMUSCULAR; INTRAVENOUS; SUBCUTANEOUS EVERY 8 HOURS
Qty: 0 | Refills: 0 | Status: DISCONTINUED | OUTPATIENT
Start: 2019-01-15 | End: 2019-01-15

## 2019-01-15 RX ORDER — ACETAMINOPHEN 500 MG
975 TABLET ORAL EVERY 6 HOURS
Qty: 0 | Refills: 0 | Status: DISCONTINUED | OUTPATIENT
Start: 2019-01-15 | End: 2019-01-15

## 2019-01-15 RX ORDER — AMPICILLIN TRIHYDRATE 250 MG
CAPSULE ORAL
Qty: 0 | Refills: 0 | Status: DISCONTINUED | OUTPATIENT
Start: 2019-01-15 | End: 2019-01-16

## 2019-01-15 RX ORDER — AMPICILLIN TRIHYDRATE 250 MG
2 CAPSULE ORAL ONCE
Qty: 0 | Refills: 0 | Status: COMPLETED | OUTPATIENT
Start: 2019-01-15 | End: 2019-01-15

## 2019-01-15 RX ORDER — DOCUSATE SODIUM 100 MG
100 CAPSULE ORAL
Qty: 0 | Refills: 0 | Status: DISCONTINUED | OUTPATIENT
Start: 2019-01-15 | End: 2019-01-20

## 2019-01-15 RX ORDER — HYDROCORTISONE 1 %
1 OINTMENT (GRAM) TOPICAL EVERY 4 HOURS
Qty: 0 | Refills: 0 | Status: DISCONTINUED | OUTPATIENT
Start: 2019-01-15 | End: 2019-01-15

## 2019-01-15 RX ORDER — PRAMOXINE HYDROCHLORIDE 150 MG/15G
1 AEROSOL, FOAM RECTAL EVERY 4 HOURS
Qty: 0 | Refills: 0 | Status: DISCONTINUED | OUTPATIENT
Start: 2019-01-15 | End: 2019-01-20

## 2019-01-15 RX ORDER — TETANUS TOXOID, REDUCED DIPHTHERIA TOXOID AND ACELLULAR PERTUSSIS VACCINE, ADSORBED 5; 2.5; 8; 8; 2.5 [IU]/.5ML; [IU]/.5ML; UG/.5ML; UG/.5ML; UG/.5ML
0.5 SUSPENSION INTRAMUSCULAR ONCE
Qty: 0 | Refills: 0 | Status: DISCONTINUED | OUTPATIENT
Start: 2019-01-15 | End: 2019-01-20

## 2019-01-15 RX ORDER — ACETAMINOPHEN 500 MG
1000 TABLET ORAL ONCE
Qty: 0 | Refills: 0 | Status: DISCONTINUED | OUTPATIENT
Start: 2019-01-15 | End: 2019-01-15

## 2019-01-15 RX ORDER — LANOLIN
1 OINTMENT (GRAM) TOPICAL EVERY 6 HOURS
Qty: 0 | Refills: 0 | Status: DISCONTINUED | OUTPATIENT
Start: 2019-01-15 | End: 2019-01-20

## 2019-01-15 RX ORDER — DIBUCAINE 1 %
1 OINTMENT (GRAM) RECTAL EVERY 4 HOURS
Qty: 0 | Refills: 0 | Status: DISCONTINUED | OUTPATIENT
Start: 2019-01-15 | End: 2019-01-20

## 2019-01-15 RX ADMIN — Medication 125 MILLIUNIT(S)/MIN: at 15:58

## 2019-01-15 RX ADMIN — Medication 208 GRAM(S): at 10:30

## 2019-01-15 RX ADMIN — Medication 216 GRAM(S): at 02:11

## 2019-01-15 RX ADMIN — Medication 208 GRAM(S): at 06:43

## 2019-01-15 RX ADMIN — Medication 208 GRAM(S): at 14:51

## 2019-01-15 NOTE — PRE-ANESTHESIA EVALUATION ADULT - NSANTHOSAYNRD_GEN_A_CORE
No. EDWARD screening performed.  STOP BANG Legend: 0-2 = LOW Risk; 3-4 = INTERMEDIATE Risk; 5-8 = HIGH Risk

## 2019-01-16 LAB
ALBUMIN SERPL ELPH-MCNC: 3.3 G/DL — SIGNIFICANT CHANGE UP (ref 3.3–5)
ALP SERPL-CCNC: 210 U/L — HIGH (ref 40–120)
ALT FLD-CCNC: 183 U/L — HIGH (ref 10–45)
ANION GAP SERPL CALC-SCNC: 11 MMOL/L — SIGNIFICANT CHANGE UP (ref 5–17)
APTT BLD: 22.1 SEC — LOW (ref 27.5–36.3)
AST SERPL-CCNC: 127 U/L — HIGH (ref 10–40)
BILIRUB SERPL-MCNC: 0.4 MG/DL — SIGNIFICANT CHANGE UP (ref 0.2–1.2)
BUN SERPL-MCNC: 6 MG/DL — LOW (ref 7–23)
CALCIUM SERPL-MCNC: 8.3 MG/DL — LOW (ref 8.4–10.5)
CHLORIDE SERPL-SCNC: 106 MMOL/L — SIGNIFICANT CHANGE UP (ref 96–108)
CO2 SERPL-SCNC: 20 MMOL/L — LOW (ref 22–31)
CREAT SERPL-MCNC: 0.64 MG/DL — SIGNIFICANT CHANGE UP (ref 0.5–1.3)
FIBRINOGEN PPP-MCNC: 646 MG/DL — HIGH (ref 350–510)
GLUCOSE SERPL-MCNC: 137 MG/DL — HIGH (ref 70–99)
HCT VFR BLD CALC: 29.4 % — LOW (ref 34.5–45)
HGB BLD-MCNC: 10.5 G/DL — LOW (ref 11.5–15.5)
INR BLD: 0.83 RATIO — LOW (ref 0.88–1.16)
LDH SERPL L TO P-CCNC: 246 U/L — HIGH (ref 50–242)
MAGNESIUM SERPL-MCNC: 5.4 MG/DL — HIGH (ref 1.6–2.6)
MAGNESIUM SERPL-MCNC: 5.9 MG/DL — HIGH (ref 1.6–2.6)
MCHC RBC-ENTMCNC: 31.6 PG — SIGNIFICANT CHANGE UP (ref 27–34)
MCHC RBC-ENTMCNC: 35.6 GM/DL — SIGNIFICANT CHANGE UP (ref 32–36)
MCV RBC AUTO: 88.5 FL — SIGNIFICANT CHANGE UP (ref 80–100)
PLATELET # BLD AUTO: 264 K/UL — SIGNIFICANT CHANGE UP (ref 150–400)
POTASSIUM SERPL-MCNC: 4 MMOL/L — SIGNIFICANT CHANGE UP (ref 3.5–5.3)
POTASSIUM SERPL-SCNC: 4 MMOL/L — SIGNIFICANT CHANGE UP (ref 3.5–5.3)
PROT SERPL-MCNC: 6.4 G/DL — SIGNIFICANT CHANGE UP (ref 6–8.3)
PROTHROM AB SERPL-ACNC: 9.5 SEC — LOW (ref 10–12.9)
RBC # BLD: 3.32 M/UL — LOW (ref 3.8–5.2)
RBC # FLD: 13.3 % — SIGNIFICANT CHANGE UP (ref 10.3–14.5)
SODIUM SERPL-SCNC: 137 MMOL/L — SIGNIFICANT CHANGE UP (ref 135–145)
URATE SERPL-MCNC: 7.8 MG/DL — HIGH (ref 2.5–7)
WBC # BLD: 19.2 K/UL — HIGH (ref 3.8–10.5)
WBC # FLD AUTO: 19.2 K/UL — HIGH (ref 3.8–10.5)

## 2019-01-16 RX ORDER — SODIUM CHLORIDE 9 MG/ML
1000 INJECTION, SOLUTION INTRAVENOUS
Qty: 0 | Refills: 0 | Status: DISCONTINUED | OUTPATIENT
Start: 2019-01-16 | End: 2019-01-19

## 2019-01-16 RX ORDER — ACETAMINOPHEN 500 MG
650 TABLET ORAL EVERY 6 HOURS
Qty: 0 | Refills: 0 | Status: DISCONTINUED | OUTPATIENT
Start: 2019-01-16 | End: 2019-01-20

## 2019-01-16 RX ORDER — IBUPROFEN 200 MG
600 TABLET ORAL EVERY 6 HOURS
Qty: 0 | Refills: 0 | Status: DISCONTINUED | OUTPATIENT
Start: 2019-01-16 | End: 2019-01-20

## 2019-01-16 RX ORDER — MAGNESIUM SULFATE 500 MG/ML
1.5 VIAL (ML) INJECTION
Qty: 40 | Refills: 0 | Status: DISCONTINUED | OUTPATIENT
Start: 2019-01-16 | End: 2019-01-16

## 2019-01-16 RX ADMIN — Medication 400 MILLIGRAM(S): at 00:05

## 2019-01-16 RX ADMIN — Medication 1000 MILLIGRAM(S): at 00:45

## 2019-01-17 ENCOUNTER — APPOINTMENT (OUTPATIENT)
Dept: ANTEPARTUM | Facility: CLINIC | Age: 29
End: 2019-01-17

## 2019-01-17 ENCOUNTER — APPOINTMENT (OUTPATIENT)
Dept: OBGYN | Facility: CLINIC | Age: 29
End: 2019-01-17

## 2019-01-17 LAB
ALBUMIN SERPL ELPH-MCNC: 3 G/DL — LOW (ref 3.3–5)
ALBUMIN SERPL ELPH-MCNC: 3.4 G/DL — SIGNIFICANT CHANGE UP (ref 3.3–5)
ALP SERPL-CCNC: 183 U/L — HIGH (ref 40–120)
ALP SERPL-CCNC: 186 U/L — HIGH (ref 40–120)
ALT FLD-CCNC: 210 U/L — HIGH (ref 10–45)
ALT FLD-CCNC: 241 U/L — HIGH (ref 10–45)
ANION GAP SERPL CALC-SCNC: 10 MMOL/L — SIGNIFICANT CHANGE UP (ref 5–17)
ANION GAP SERPL CALC-SCNC: 11 MMOL/L — SIGNIFICANT CHANGE UP (ref 5–17)
APTT BLD: 23.5 SEC — LOW (ref 27.5–36.3)
APTT BLD: 24.1 SEC — LOW (ref 27.5–36.3)
AST SERPL-CCNC: 116 U/L — HIGH (ref 10–40)
AST SERPL-CCNC: 126 U/L — HIGH (ref 10–40)
BASOPHILS # BLD AUTO: 0.1 K/UL — SIGNIFICANT CHANGE UP (ref 0–0.2)
BASOPHILS # BLD AUTO: 0.1 K/UL — SIGNIFICANT CHANGE UP (ref 0–0.2)
BASOPHILS NFR BLD AUTO: 0.3 % — SIGNIFICANT CHANGE UP (ref 0–2)
BASOPHILS NFR BLD AUTO: 0.4 % — SIGNIFICANT CHANGE UP (ref 0–2)
BILIRUB SERPL-MCNC: 0.2 MG/DL — SIGNIFICANT CHANGE UP (ref 0.2–1.2)
BILIRUB SERPL-MCNC: 0.2 MG/DL — SIGNIFICANT CHANGE UP (ref 0.2–1.2)
BUN SERPL-MCNC: 10 MG/DL — SIGNIFICANT CHANGE UP (ref 7–23)
BUN SERPL-MCNC: 9 MG/DL — SIGNIFICANT CHANGE UP (ref 7–23)
CALCIUM SERPL-MCNC: 9 MG/DL — SIGNIFICANT CHANGE UP (ref 8.4–10.5)
CALCIUM SERPL-MCNC: 9 MG/DL — SIGNIFICANT CHANGE UP (ref 8.4–10.5)
CHLORIDE SERPL-SCNC: 106 MMOL/L — SIGNIFICANT CHANGE UP (ref 96–108)
CHLORIDE SERPL-SCNC: 108 MMOL/L — SIGNIFICANT CHANGE UP (ref 96–108)
CO2 SERPL-SCNC: 22 MMOL/L — SIGNIFICANT CHANGE UP (ref 22–31)
CO2 SERPL-SCNC: 22 MMOL/L — SIGNIFICANT CHANGE UP (ref 22–31)
CREAT SERPL-MCNC: 0.66 MG/DL — SIGNIFICANT CHANGE UP (ref 0.5–1.3)
CREAT SERPL-MCNC: 0.7 MG/DL — SIGNIFICANT CHANGE UP (ref 0.5–1.3)
EOSINOPHIL # BLD AUTO: 0.1 K/UL — SIGNIFICANT CHANGE UP (ref 0–0.5)
EOSINOPHIL # BLD AUTO: 0.1 K/UL — SIGNIFICANT CHANGE UP (ref 0–0.5)
EOSINOPHIL NFR BLD AUTO: 0.3 % — SIGNIFICANT CHANGE UP (ref 0–6)
EOSINOPHIL NFR BLD AUTO: 0.7 % — SIGNIFICANT CHANGE UP (ref 0–6)
FIBRINOGEN PPP-MCNC: 577 MG/DL — HIGH (ref 350–510)
FIBRINOGEN PPP-MCNC: 598 MG/DL — HIGH (ref 350–510)
GLUCOSE SERPL-MCNC: 81 MG/DL — SIGNIFICANT CHANGE UP (ref 70–99)
GLUCOSE SERPL-MCNC: 81 MG/DL — SIGNIFICANT CHANGE UP (ref 70–99)
HCT VFR BLD CALC: 29.6 % — LOW (ref 34.5–45)
HCT VFR BLD CALC: 30.2 % — LOW (ref 34.5–45)
HGB BLD-MCNC: 10.1 G/DL — LOW (ref 11.5–15.5)
HGB BLD-MCNC: 10.6 G/DL — LOW (ref 11.5–15.5)
INR BLD: 0.81 RATIO — LOW (ref 0.88–1.16)
INR BLD: 0.86 RATIO — LOW (ref 0.88–1.16)
LDH SERPL L TO P-CCNC: 244 U/L — HIGH (ref 50–242)
LDH SERPL L TO P-CCNC: 259 U/L — HIGH (ref 50–242)
LYMPHOCYTES # BLD AUTO: 18.3 % — SIGNIFICANT CHANGE UP (ref 13–44)
LYMPHOCYTES # BLD AUTO: 19.8 % — SIGNIFICANT CHANGE UP (ref 13–44)
LYMPHOCYTES # BLD AUTO: 2.9 K/UL — SIGNIFICANT CHANGE UP (ref 1–3.3)
LYMPHOCYTES # BLD AUTO: 3.2 K/UL — SIGNIFICANT CHANGE UP (ref 1–3.3)
MCHC RBC-ENTMCNC: 30.6 PG — SIGNIFICANT CHANGE UP (ref 27–34)
MCHC RBC-ENTMCNC: 31.6 PG — SIGNIFICANT CHANGE UP (ref 27–34)
MCHC RBC-ENTMCNC: 34.1 GM/DL — SIGNIFICANT CHANGE UP (ref 32–36)
MCHC RBC-ENTMCNC: 35.2 GM/DL — SIGNIFICANT CHANGE UP (ref 32–36)
MCV RBC AUTO: 89.7 FL — SIGNIFICANT CHANGE UP (ref 80–100)
MCV RBC AUTO: 90 FL — SIGNIFICANT CHANGE UP (ref 80–100)
MONOCYTES # BLD AUTO: 1.2 K/UL — HIGH (ref 0–0.9)
MONOCYTES # BLD AUTO: 1.5 K/UL — HIGH (ref 0–0.9)
MONOCYTES NFR BLD AUTO: 7.7 % — SIGNIFICANT CHANGE UP (ref 2–14)
MONOCYTES NFR BLD AUTO: 9.4 % — SIGNIFICANT CHANGE UP (ref 2–14)
NEUTROPHILS # BLD AUTO: 11.4 K/UL — HIGH (ref 1.8–7.4)
NEUTROPHILS # BLD AUTO: 11.6 K/UL — HIGH (ref 1.8–7.4)
NEUTROPHILS NFR BLD AUTO: 70.1 % — SIGNIFICANT CHANGE UP (ref 43–77)
NEUTROPHILS NFR BLD AUTO: 73 % — SIGNIFICANT CHANGE UP (ref 43–77)
PLATELET # BLD AUTO: 242 K/UL — SIGNIFICANT CHANGE UP (ref 150–400)
PLATELET # BLD AUTO: 262 K/UL — SIGNIFICANT CHANGE UP (ref 150–400)
POTASSIUM SERPL-MCNC: 4.1 MMOL/L — SIGNIFICANT CHANGE UP (ref 3.5–5.3)
POTASSIUM SERPL-MCNC: 4.1 MMOL/L — SIGNIFICANT CHANGE UP (ref 3.5–5.3)
POTASSIUM SERPL-SCNC: 4.1 MMOL/L — SIGNIFICANT CHANGE UP (ref 3.5–5.3)
POTASSIUM SERPL-SCNC: 4.1 MMOL/L — SIGNIFICANT CHANGE UP (ref 3.5–5.3)
PROT SERPL-MCNC: 5.9 G/DL — LOW (ref 6–8.3)
PROT SERPL-MCNC: 6.2 G/DL — SIGNIFICANT CHANGE UP (ref 6–8.3)
PROTHROM AB SERPL-ACNC: 9.3 SEC — LOW (ref 10–12.9)
PROTHROM AB SERPL-ACNC: 9.7 SEC — LOW (ref 10–12.9)
RBC # BLD: 3.3 M/UL — LOW (ref 3.8–5.2)
RBC # BLD: 3.36 M/UL — LOW (ref 3.8–5.2)
RBC # FLD: 13.1 % — SIGNIFICANT CHANGE UP (ref 10.3–14.5)
RBC # FLD: 13.7 % — SIGNIFICANT CHANGE UP (ref 10.3–14.5)
SODIUM SERPL-SCNC: 139 MMOL/L — SIGNIFICANT CHANGE UP (ref 135–145)
SODIUM SERPL-SCNC: 140 MMOL/L — SIGNIFICANT CHANGE UP (ref 135–145)
URATE SERPL-MCNC: 7.5 MG/DL — HIGH (ref 2.5–7)
URATE SERPL-MCNC: 7.6 MG/DL — HIGH (ref 2.5–7)
WBC # BLD: 15.9 K/UL — HIGH (ref 3.8–10.5)
WBC # BLD: 16.3 K/UL — HIGH (ref 3.8–10.5)
WBC # FLD AUTO: 15.9 K/UL — HIGH (ref 3.8–10.5)
WBC # FLD AUTO: 16.3 K/UL — HIGH (ref 3.8–10.5)

## 2019-01-17 RX ORDER — METOCLOPRAMIDE HCL 10 MG
5 TABLET ORAL ONCE
Qty: 0 | Refills: 0 | Status: COMPLETED | OUTPATIENT
Start: 2019-01-17 | End: 2019-01-17

## 2019-01-17 RX ORDER — LORATADINE 10 MG/1
10 TABLET ORAL DAILY
Qty: 0 | Refills: 0 | Status: DISCONTINUED | OUTPATIENT
Start: 2019-01-17 | End: 2019-01-20

## 2019-01-17 RX ORDER — DIPHENHYDRAMINE HCL 50 MG
50 CAPSULE ORAL ONCE
Qty: 0 | Refills: 0 | Status: DISCONTINUED | OUTPATIENT
Start: 2019-01-17 | End: 2019-01-18

## 2019-01-17 RX ADMIN — Medication 5 MILLIGRAM(S): at 05:29

## 2019-01-17 RX ADMIN — Medication 1 TABLET(S): at 11:48

## 2019-01-17 RX ADMIN — Medication 650 MILLIGRAM(S): at 00:54

## 2019-01-17 RX ADMIN — Medication 40 MILLIGRAM(S): at 23:52

## 2019-01-17 RX ADMIN — Medication 600 MILLIGRAM(S): at 10:48

## 2019-01-17 RX ADMIN — Medication 600 MILLIGRAM(S): at 19:01

## 2019-01-17 RX ADMIN — Medication 650 MILLIGRAM(S): at 01:30

## 2019-01-17 RX ADMIN — Medication 600 MILLIGRAM(S): at 18:31

## 2019-01-17 RX ADMIN — Medication 600 MILLIGRAM(S): at 11:18

## 2019-01-17 RX ADMIN — Medication 650 MILLIGRAM(S): at 06:38

## 2019-01-17 RX ADMIN — LORATADINE 10 MILLIGRAM(S): 10 TABLET ORAL at 14:17

## 2019-01-17 NOTE — CONSULT NOTE ADULT - SUBJECTIVE AND OBJECTIVE BOX
Neurology Consult    HPI:  28y Female PMH sinus headache, Asthma, s/p delivery 1/15 p/w headache. As per patient, has had constant headache since 1/12. Headache described as throbbing, sharp pain on the right side of the head radiating down R neck. Initially 10/10 in intensity but diminished to 5-7 in intensity, drops to 2-3 s/p motrin, reglan; does not report headache fully going away at any time. Also with photophobia in R eye, phonophobia. Had nausea/vomiting initially but since resolved, also complaining of tearing up and blurred vision in R eye.     REVIEW OF SYSTEMS:  As above    MEDICATIONS  acetaminophen   Tablet .. 650 milliGRAM(s) Oral every 6 hours PRN  dibucaine 1% Ointment 1 Application(s) Topical every 4 hours PRN  diphenhydrAMINE 25 milliGRAM(s) Oral every 6 hours PRN  diphtheria/tetanus/pertussis (acellular) Vaccine (ADAcel) 0.5 milliLiter(s) IntraMuscular once  docusate sodium 100 milliGRAM(s) Oral two times a day PRN  glycerin Suppository - Adult 1 Suppository(s) Rectal at bedtime PRN  hydrocortisone 1% Cream 1 Application(s) Topical every 4 hours PRN  ibuprofen  Tablet. 600 milliGRAM(s) Oral every 6 hours PRN  lactated ringers. 1000 milliLiter(s) IV Continuous <Continuous>  lanolin Ointment 1 Application(s) Topical every 6 hours PRN  loratadine 10 milliGRAM(s) Oral daily  magnesium hydroxide Suspension 30 milliLiter(s) Oral two times a day PRN  metoclopramide Injectable 10 milliGRAM(s) IV Push once  misoprostol Oral Solution 60 MICROGram(s) Oral every 2 hours  oxyCODONE    IR 5 milliGRAM(s) Oral every 3 hours  oxyCODONE    IR 5 milliGRAM(s) Oral every 4 hours PRN  oxytocin Infusion 41.667 milliUNIT(s)/Min IV Continuous <Continuous>  oxytocin Infusion 41.667 milliUNIT(s)/Min IV Continuous <Continuous>  pramoxine 1%/zinc 5% Cream 1 Application(s) Topical every 4 hours PRN  prenatal multivitamin 1 Tablet(s) Oral daily  simethicone 80 milliGRAM(s) Chew every 6 hours PRN  witch hazel Pads 1 Application(s) Topical every 4 hours PRN    PMH: Asthma  Sinusitis    PSH: No significant past surgical history    FAMILY HISTORY:  No history of dementia, strokes, or seizures     SOCIAL HISTORY:  No history of tobacco or alcohol use     Allergies    Aleve (Anaphylaxis)  apple (Pruritus)  peanuts (Anaphylaxis)  Pepto-Bismol (Anaphylaxis)    Intolerances    Vital Signs Last 24 Hrs  T(C): 36.8 (17 Jan 2019 13:56), Max: 36.9 (17 Jan 2019 09:52)  T(F): 98.2 (17 Jan 2019 13:56), Max: 98.4 (17 Jan 2019 09:52)  HR: 98 (17 Jan 2019 13:56) (74 - 100)  BP: 128/89 (17 Jan 2019 13:56) (119/82 - 134/79)  BP(mean): --  RR: 18 (17 Jan 2019 13:56) (18 - 18)  SpO2: 100% (17 Jan 2019 05:34) (96% - 100%)    Neurological Examination:    Mental Status: Patient is alert, awake, oriented X3. Patient is fluent, no dysarthria, no aphasia. Follows commands well and able to answer questions appropriately. Mood and affect normal.    Cranial Nerves: PERRL, EOMI, visual field intact, V1-V3 intact, no allodynia to palpation of face, no gross facial asymmetry, tongue/uvula midline    Motor Exam: No drift  Right upper extremity: 5/5  Left upper extremity: 5/5  Right lower extremity: 5/5  Left lower extremity: 5/5    Normal bulk/tone    Sensory: Intact to light touch bilaterally. No extinction    Coordination: Finger to nose intact bilaterally     Gait: Normal      Reflexes: Bilateral 2+ Biceps, Brachial, Patellar, Ankle  Plantar: Down bilateral    GENERAL: No acute distress  HEENT:  Normocephalic, atraumatic  EXTREMITIES: No edema, clubbing, cyanosis  MUSCULOSKELETAL: Normal range of motion  SKIN: No rashes    LABS:  CBC Full  -  ( 17 Jan 2019 06:55 )  WBC Count : 15.9 K/uL  Hemoglobin : 10.1 g/dL  Hematocrit : 29.6 %  Platelet Count - Automated : 242 K/uL  Mean Cell Volume : 89.7 fl  Mean Cell Hemoglobin : 30.6 pg  Mean Cell Hemoglobin Concentration : 34.1 gm/dL  Auto Neutrophil # : 11.6 K/uL  Auto Lymphocyte # : 2.9 K/uL  Auto Monocyte # : 1.2 K/uL  Auto Eosinophil # : 0.1 K/uL  Auto Basophil # : 0.1 K/uL  Auto Neutrophil % : 73.0 %  Auto Lymphocyte % : 18.3 %  Auto Monocyte % : 7.7 %  Auto Eosinophil % : 0.7 %  Auto Basophil % : 0.3 %      01-17    140  |  108  |  9   ----------------------------<  81  4.1   |  22  |  0.66    Ca    9.0      17 Jan 2019 06:52  Mg     5.9     01-16    TPro  5.9<L>  /  Alb  3.0<L>  /  TBili  0.2  /  DBili  x   /  AST  116<H>  /  ALT  210<H>  /  AlkPhos  183<H>  01-17    LIVER FUNCTIONS - ( 17 Jan 2019 06:52 )  Alb: 3.0 g/dL / Pro: 5.9 g/dL / ALK PHOS: 183 U/L / ALT: 210 U/L / AST: 116 U/L / GGT: x           Hemoglobin A1C:       PT/INR - ( 17 Jan 2019 06:57 )   PT: 9.3 sec;   INR: 0.81 ratio         PTT - ( 17 Jan 2019 06:57 )  PTT:24.1 sec    RADIOLOGY:  CT head:  MRI:

## 2019-01-17 NOTE — CONSULT NOTE ADULT - ASSESSMENT
28y Female PMH sinus headache, Asthma, s/p delivery 1/15 p/w headache. 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     Recs:  CT head w/o contrast / CT venogram w/ contrast  Will defer decision for trial of indomethacin post imaging  Pain control(would continue with tylenol/reglan) 28y Female PMH sinus headache, Asthma, s/p delivery 1/15 p/w headache. 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     Recs:  CT head w/o contrast / CT venogram w/ contrast  Will defer decision for trial of occipital nerve block post imaging  Pain control(would continue with tylenol/reglan)

## 2019-01-18 ENCOUNTER — TRANSCRIPTION ENCOUNTER (OUTPATIENT)
Age: 29
End: 2019-01-18

## 2019-01-18 LAB
ALBUMIN SERPL ELPH-MCNC: 3.4 G/DL — SIGNIFICANT CHANGE UP (ref 3.3–5)
ALBUMIN SERPL ELPH-MCNC: 3.4 G/DL — SIGNIFICANT CHANGE UP (ref 3.3–5)
ALBUMIN SERPL ELPH-MCNC: 3.5 G/DL — SIGNIFICANT CHANGE UP (ref 3.3–5)
ALP SERPL-CCNC: 197 U/L — HIGH (ref 40–120)
ALP SERPL-CCNC: 200 U/L — HIGH (ref 40–120)
ALP SERPL-CCNC: 205 U/L — HIGH (ref 40–120)
ALT FLD-CCNC: 289 U/L — HIGH (ref 10–45)
ALT FLD-CCNC: 332 U/L — HIGH (ref 10–45)
ALT FLD-CCNC: 340 U/L — HIGH (ref 10–45)
ANION GAP SERPL CALC-SCNC: 11 MMOL/L — SIGNIFICANT CHANGE UP (ref 5–17)
ANION GAP SERPL CALC-SCNC: 12 MMOL/L — SIGNIFICANT CHANGE UP (ref 5–17)
ANION GAP SERPL CALC-SCNC: 14 MMOL/L — SIGNIFICANT CHANGE UP (ref 5–17)
APTT BLD: 24.2 SEC — LOW (ref 27.5–36.3)
APTT BLD: 24.8 SEC — LOW (ref 27.5–36.3)
APTT BLD: 25.2 SEC — LOW (ref 27.5–36.3)
AST SERPL-CCNC: 177 U/L — HIGH (ref 10–40)
AST SERPL-CCNC: 195 U/L — HIGH (ref 10–40)
AST SERPL-CCNC: 220 U/L — HIGH (ref 10–40)
BASOPHILS # BLD AUTO: 0 K/UL — SIGNIFICANT CHANGE UP (ref 0–0.2)
BASOPHILS NFR BLD AUTO: 0 % — SIGNIFICANT CHANGE UP (ref 0–2)
BILIRUB SERPL-MCNC: 0.2 MG/DL — SIGNIFICANT CHANGE UP (ref 0.2–1.2)
BUN SERPL-MCNC: 11 MG/DL — SIGNIFICANT CHANGE UP (ref 7–23)
BUN SERPL-MCNC: 11 MG/DL — SIGNIFICANT CHANGE UP (ref 7–23)
BUN SERPL-MCNC: 12 MG/DL — SIGNIFICANT CHANGE UP (ref 7–23)
CALCIUM SERPL-MCNC: 8.5 MG/DL — SIGNIFICANT CHANGE UP (ref 8.4–10.5)
CALCIUM SERPL-MCNC: 9.3 MG/DL — SIGNIFICANT CHANGE UP (ref 8.4–10.5)
CALCIUM SERPL-MCNC: 9.4 MG/DL — SIGNIFICANT CHANGE UP (ref 8.4–10.5)
CHLORIDE SERPL-SCNC: 106 MMOL/L — SIGNIFICANT CHANGE UP (ref 96–108)
CO2 SERPL-SCNC: 20 MMOL/L — LOW (ref 22–31)
CO2 SERPL-SCNC: 21 MMOL/L — LOW (ref 22–31)
CO2 SERPL-SCNC: 22 MMOL/L — SIGNIFICANT CHANGE UP (ref 22–31)
CREAT SERPL-MCNC: 0.67 MG/DL — SIGNIFICANT CHANGE UP (ref 0.5–1.3)
CREAT SERPL-MCNC: 0.8 MG/DL — SIGNIFICANT CHANGE UP (ref 0.5–1.3)
CREAT SERPL-MCNC: 0.83 MG/DL — SIGNIFICANT CHANGE UP (ref 0.5–1.3)
EOSINOPHIL # BLD AUTO: 0 K/UL — SIGNIFICANT CHANGE UP (ref 0–0.5)
EOSINOPHIL NFR BLD AUTO: 0 % — SIGNIFICANT CHANGE UP (ref 0–6)
FIBRINOGEN PPP-MCNC: 565 MG/DL — HIGH (ref 350–510)
FIBRINOGEN PPP-MCNC: 586 MG/DL — HIGH (ref 350–510)
FIBRINOGEN PPP-MCNC: 631 MG/DL — HIGH (ref 350–510)
GLUCOSE SERPL-MCNC: 101 MG/DL — HIGH (ref 70–99)
GLUCOSE SERPL-MCNC: 103 MG/DL — HIGH (ref 70–99)
GLUCOSE SERPL-MCNC: 143 MG/DL — HIGH (ref 70–99)
HCT VFR BLD CALC: 28.7 % — LOW (ref 34.5–45)
HCT VFR BLD CALC: 30.4 % — LOW (ref 34.5–45)
HGB BLD-MCNC: 10.5 G/DL — LOW (ref 11.5–15.5)
HGB BLD-MCNC: 9.9 G/DL — LOW (ref 11.5–15.5)
INR BLD: 0.81 RATIO — LOW (ref 0.88–1.16)
INR BLD: 0.86 RATIO — LOW (ref 0.88–1.16)
INR BLD: 0.88 RATIO — SIGNIFICANT CHANGE UP (ref 0.88–1.16)
LDH SERPL L TO P-CCNC: 285 U/L — HIGH (ref 50–242)
LDH SERPL L TO P-CCNC: 297 U/L — HIGH (ref 50–242)
LDH SERPL L TO P-CCNC: 327 U/L — HIGH (ref 50–242)
LYMPHOCYTES # BLD AUTO: 18 % — SIGNIFICANT CHANGE UP (ref 13–44)
LYMPHOCYTES # BLD AUTO: 2.2 K/UL — SIGNIFICANT CHANGE UP (ref 1–3.3)
MCHC RBC-ENTMCNC: 31 PG — SIGNIFICANT CHANGE UP (ref 27–34)
MCHC RBC-ENTMCNC: 31.1 PG — SIGNIFICANT CHANGE UP (ref 27–34)
MCHC RBC-ENTMCNC: 34.5 GM/DL — SIGNIFICANT CHANGE UP (ref 32–36)
MCHC RBC-ENTMCNC: 34.6 GM/DL — SIGNIFICANT CHANGE UP (ref 32–36)
MCV RBC AUTO: 89.7 FL — SIGNIFICANT CHANGE UP (ref 80–100)
MCV RBC AUTO: 90 FL — SIGNIFICANT CHANGE UP (ref 80–100)
MONOCYTES # BLD AUTO: 0.7 K/UL — SIGNIFICANT CHANGE UP (ref 0–0.9)
MONOCYTES NFR BLD AUTO: 1 % — LOW (ref 2–14)
NEUTROPHILS # BLD AUTO: 14.1 K/UL — HIGH (ref 1.8–7.4)
NEUTROPHILS NFR BLD AUTO: 78 % — HIGH (ref 43–77)
PLATELET # BLD AUTO: 283 K/UL — SIGNIFICANT CHANGE UP (ref 150–400)
PLATELET # BLD AUTO: 286 K/UL — SIGNIFICANT CHANGE UP (ref 150–400)
POTASSIUM SERPL-MCNC: 3.5 MMOL/L — SIGNIFICANT CHANGE UP (ref 3.5–5.3)
POTASSIUM SERPL-MCNC: 4 MMOL/L — SIGNIFICANT CHANGE UP (ref 3.5–5.3)
POTASSIUM SERPL-MCNC: 4.4 MMOL/L — SIGNIFICANT CHANGE UP (ref 3.5–5.3)
POTASSIUM SERPL-SCNC: 3.5 MMOL/L — SIGNIFICANT CHANGE UP (ref 3.5–5.3)
POTASSIUM SERPL-SCNC: 4 MMOL/L — SIGNIFICANT CHANGE UP (ref 3.5–5.3)
POTASSIUM SERPL-SCNC: 4.4 MMOL/L — SIGNIFICANT CHANGE UP (ref 3.5–5.3)
PROT SERPL-MCNC: 6.3 G/DL — SIGNIFICANT CHANGE UP (ref 6–8.3)
PROT SERPL-MCNC: 6.3 G/DL — SIGNIFICANT CHANGE UP (ref 6–8.3)
PROT SERPL-MCNC: 6.5 G/DL — SIGNIFICANT CHANGE UP (ref 6–8.3)
PROTHROM AB SERPL-ACNC: 10.1 SEC — SIGNIFICANT CHANGE UP (ref 10–12.9)
PROTHROM AB SERPL-ACNC: 9.2 SEC — LOW (ref 10–12.9)
PROTHROM AB SERPL-ACNC: 9.9 SEC — LOW (ref 10–12.9)
RBC # BLD: 3.19 M/UL — LOW (ref 3.8–5.2)
RBC # BLD: 3.39 M/UL — LOW (ref 3.8–5.2)
RBC # FLD: 13.3 % — SIGNIFICANT CHANGE UP (ref 10.3–14.5)
RBC # FLD: 13.3 % — SIGNIFICANT CHANGE UP (ref 10.3–14.5)
SODIUM SERPL-SCNC: 139 MMOL/L — SIGNIFICANT CHANGE UP (ref 135–145)
SODIUM SERPL-SCNC: 139 MMOL/L — SIGNIFICANT CHANGE UP (ref 135–145)
SODIUM SERPL-SCNC: 140 MMOL/L — SIGNIFICANT CHANGE UP (ref 135–145)
URATE SERPL-MCNC: 6.9 MG/DL — SIGNIFICANT CHANGE UP (ref 2.5–7)
URATE SERPL-MCNC: 7 MG/DL — SIGNIFICANT CHANGE UP (ref 2.5–7)
URATE SERPL-MCNC: 7 MG/DL — SIGNIFICANT CHANGE UP (ref 2.5–7)
WBC # BLD: 17.1 K/UL — HIGH (ref 3.8–10.5)
WBC # BLD: 17.2 K/UL — HIGH (ref 3.8–10.5)
WBC # FLD AUTO: 17.1 K/UL — HIGH (ref 3.8–10.5)
WBC # FLD AUTO: 17.2 K/UL — HIGH (ref 3.8–10.5)

## 2019-01-18 PROCEDURE — 99254 IP/OBS CNSLTJ NEW/EST MOD 60: CPT | Mod: GC

## 2019-01-18 PROCEDURE — 70496 CT ANGIOGRAPHY HEAD: CPT | Mod: 26

## 2019-01-18 RX ORDER — SIMETHICONE 80 MG/1
1 TABLET, CHEWABLE ORAL
Qty: 0 | Refills: 0 | DISCHARGE
Start: 2019-01-18

## 2019-01-18 RX ORDER — PRAMOXINE HYDROCHLORIDE 150 MG/15G
1 AEROSOL, FOAM RECTAL
Qty: 0 | Refills: 0 | DISCHARGE
Start: 2019-01-18

## 2019-01-18 RX ORDER — IBUPROFEN 200 MG
1 TABLET ORAL
Qty: 0 | Refills: 0 | DISCHARGE
Start: 2019-01-18

## 2019-01-18 RX ORDER — MAGNESIUM SULFATE 500 MG/ML
2 VIAL (ML) INJECTION
Qty: 40 | Refills: 0 | Status: DISCONTINUED | OUTPATIENT
Start: 2019-01-18 | End: 2019-01-19

## 2019-01-18 RX ORDER — ACETAMINOPHEN 500 MG
2 TABLET ORAL
Qty: 0 | Refills: 0 | DISCHARGE
Start: 2019-01-18

## 2019-01-18 RX ORDER — DOCUSATE SODIUM 100 MG
1 CAPSULE ORAL
Qty: 0 | Refills: 0 | DISCHARGE
Start: 2019-01-18

## 2019-01-18 RX ORDER — DIPHENHYDRAMINE HCL 50 MG
50 CAPSULE ORAL ONCE
Qty: 0 | Refills: 0 | Status: COMPLETED | OUTPATIENT
Start: 2019-01-18 | End: 2019-01-18

## 2019-01-18 RX ORDER — LANOLIN
1 OINTMENT (GRAM) TOPICAL
Qty: 0 | Refills: 0 | DISCHARGE
Start: 2019-01-18

## 2019-01-18 RX ORDER — MAGNESIUM SULFATE 500 MG/ML
4 VIAL (ML) INJECTION ONCE
Qty: 0 | Refills: 0 | Status: COMPLETED | OUTPATIENT
Start: 2019-01-18 | End: 2019-01-18

## 2019-01-18 RX ORDER — DIPHENHYDRAMINE HCL 50 MG
1 CAPSULE ORAL
Qty: 0 | Refills: 0 | DISCHARGE
Start: 2019-01-18

## 2019-01-18 RX ORDER — GLYCERIN ADULT
1 SUPPOSITORY, RECTAL RECTAL
Qty: 0 | Refills: 0 | DISCHARGE
Start: 2019-01-18

## 2019-01-18 RX ORDER — DIBUCAINE 1 %
1 OINTMENT (GRAM) RECTAL
Qty: 0 | Refills: 0 | DISCHARGE
Start: 2019-01-18

## 2019-01-18 RX ORDER — LORATADINE 10 MG/1
1 TABLET ORAL
Qty: 0 | Refills: 0 | DISCHARGE
Start: 2019-01-18

## 2019-01-18 RX ORDER — HYDROCORTISONE 1 %
1 OINTMENT (GRAM) TOPICAL
Qty: 0 | Refills: 0 | DISCHARGE
Start: 2019-01-18

## 2019-01-18 RX ADMIN — Medication 300 GRAM(S): at 18:41

## 2019-01-18 RX ADMIN — Medication 600 MILLIGRAM(S): at 03:35

## 2019-01-18 RX ADMIN — Medication 50 GM/HR: at 19:04

## 2019-01-18 RX ADMIN — Medication 1 TABLET(S): at 13:31

## 2019-01-18 RX ADMIN — Medication 600 MILLIGRAM(S): at 20:37

## 2019-01-18 RX ADMIN — Medication 600 MILLIGRAM(S): at 09:00

## 2019-01-18 RX ADMIN — Medication 50 MILLIGRAM(S): at 03:05

## 2019-01-18 RX ADMIN — LORATADINE 10 MILLIGRAM(S): 10 TABLET ORAL at 13:30

## 2019-01-18 RX ADMIN — Medication 600 MILLIGRAM(S): at 03:05

## 2019-01-18 RX ADMIN — Medication 600 MILLIGRAM(S): at 08:43

## 2019-01-18 NOTE — DISCHARGE NOTE OB - HOSPITAL COURSE
patient was admitted at 36 weeks for severe preeclampsia - underwent successful induction of labor -     Postpartum course was uncomplicated except for elevated LFTs  Patient also with report of headache - CT scan negative patient was admitted at 36 weeks for severe preeclampsia - underwent successful induction of labor -     Postpartum course was uncomplicated except for elevated LFTs.    Patient also with report of headache - CT scan negative    Addendum:  HELLP labs drawn this AM need to show rising LFTs, afternoon labs revealed LFTs 220/332.  MFM was suggested as initiating magnesium therapy.  Patient's discharge was reversed.  She will stanued observation and magnesium therapy.

## 2019-01-18 NOTE — DISCHARGE NOTE OB - MEDICATION SUMMARY - MEDICATIONS TO TAKE
I will START or STAY ON the medications listed below when I get home from the hospital:    acetaminophen 325 mg oral tablet  -- 2 tab(s) by mouth every 6 hours, As needed, Mild Pain (1 - 3)  -- Indication: For Vaginal delivery    ibuprofen 600 mg oral tablet  -- 1 tab(s) by mouth every 6 hours, As needed, Moderate Pain (4 - 6)  -- Indication: For Vaginal delivery    diphenhydrAMINE 25 mg oral capsule  -- 1 cap(s) by mouth every 6 hours, As needed, Itching  -- Indication: For Vaginal delivery    loratadine 10 mg oral tablet  -- 1 tab(s) by mouth once a day  -- Indication: For Vaginal delivery    dibucaine 1% topical ointment  -- 1 application on skin every 4 hours, As needed, Perineal Discomfort  -- Indication: For Vaginal delivery    hydrocortisone 1% topical cream  -- 1 application on skin every 4 hours, As needed, Moderate to Severe Perineal Pain  -- Indication: For Vaginal delivery    lanolin topical ointment  -- 1 application on skin every 6 hours, As needed, Sore Nipples  -- Indication: For Vaginal delivery    pramoxine 1% topical cream  -- 1 application on skin every 4 hours, As needed, Moderate to Severe Perineal Pain  -- Indication: For Vaginal delivery    docusate sodium 100 mg oral capsule  -- 1 cap(s) by mouth 2 times a day, As needed, Stool Softening  -- Indication: For Vaginal delivery    glycerin adult rectal suppository  -- 1 suppository(ies) rectally once a day (at bedtime), As needed, Constipation  -- Indication: For Vaginal delivery    simethicone 80 mg oral tablet, chewable  -- 1 tab(s) by mouth every 6 hours, As needed, Gas  -- Indication: For Vaginal delivery

## 2019-01-18 NOTE — DISCHARGE NOTE OB - CARE PLAN
Principal Discharge DX:	Vaginal delivery  Goal:	return to normal daily activity  Assessment and plan of treatment:	return to the office in one week for postpartum evaluation - followup LFTs

## 2019-01-18 NOTE — DISCHARGE NOTE OB - CARE PROVIDER_API CALL
Suzan Henriquez), Obstetrics and Gynecology  [address] [address]  Phone: [phone]  Fax: [phone]

## 2019-01-18 NOTE — DISCHARGE NOTE OB - PATIENT PORTAL LINK FT
You can access the [facility] Patient Portal, offered by [facility], by registering with the following website: [email or web address]

## 2019-01-18 NOTE — DISCHARGE NOTE OB - PLAN OF CARE
return to normal daily activity return to the office in one week for postpartum evaluation - followup LFTs

## 2019-01-19 LAB
ALBUMIN SERPL ELPH-MCNC: 3.1 G/DL — LOW (ref 3.3–5)
ALBUMIN SERPL ELPH-MCNC: 3.3 G/DL — SIGNIFICANT CHANGE UP (ref 3.3–5)
ALP SERPL-CCNC: 188 U/L — HIGH (ref 40–120)
ALP SERPL-CCNC: 195 U/L — HIGH (ref 40–120)
ALT FLD-CCNC: 292 U/L — HIGH (ref 10–45)
ALT FLD-CCNC: 313 U/L — HIGH (ref 10–45)
ANION GAP SERPL CALC-SCNC: 10 MMOL/L — SIGNIFICANT CHANGE UP (ref 5–17)
ANION GAP SERPL CALC-SCNC: 11 MMOL/L — SIGNIFICANT CHANGE UP (ref 5–17)
APTT BLD: 24.6 SEC — LOW (ref 27.5–36.3)
APTT BLD: 25.3 SEC — LOW (ref 27.5–36.3)
AST SERPL-CCNC: 114 U/L — HIGH (ref 10–40)
AST SERPL-CCNC: 158 U/L — HIGH (ref 10–40)
BASOPHILS # BLD AUTO: 0 K/UL — SIGNIFICANT CHANGE UP (ref 0–0.2)
BASOPHILS # BLD AUTO: 0.1 K/UL — SIGNIFICANT CHANGE UP (ref 0–0.2)
BASOPHILS # BLD AUTO: 0.1 K/UL — SIGNIFICANT CHANGE UP (ref 0–0.2)
BASOPHILS NFR BLD AUTO: 0.1 % — SIGNIFICANT CHANGE UP (ref 0–2)
BASOPHILS NFR BLD AUTO: 0.6 % — SIGNIFICANT CHANGE UP (ref 0–2)
BILIRUB SERPL-MCNC: 0.1 MG/DL — LOW (ref 0.2–1.2)
BILIRUB SERPL-MCNC: 0.1 MG/DL — LOW (ref 0.2–1.2)
BUN SERPL-MCNC: 8 MG/DL — SIGNIFICANT CHANGE UP (ref 7–23)
BUN SERPL-MCNC: 9 MG/DL — SIGNIFICANT CHANGE UP (ref 7–23)
CALCIUM SERPL-MCNC: 7.8 MG/DL — LOW (ref 8.4–10.5)
CALCIUM SERPL-MCNC: 7.8 MG/DL — LOW (ref 8.4–10.5)
CHLORIDE SERPL-SCNC: 105 MMOL/L — SIGNIFICANT CHANGE UP (ref 96–108)
CHLORIDE SERPL-SCNC: 108 MMOL/L — SIGNIFICANT CHANGE UP (ref 96–108)
CO2 SERPL-SCNC: 22 MMOL/L — SIGNIFICANT CHANGE UP (ref 22–31)
CO2 SERPL-SCNC: 23 MMOL/L — SIGNIFICANT CHANGE UP (ref 22–31)
CREAT SERPL-MCNC: 0.65 MG/DL — SIGNIFICANT CHANGE UP (ref 0.5–1.3)
CREAT SERPL-MCNC: 0.73 MG/DL — SIGNIFICANT CHANGE UP (ref 0.5–1.3)
EOSINOPHIL # BLD AUTO: 0.1 K/UL — SIGNIFICANT CHANGE UP (ref 0–0.5)
EOSINOPHIL # BLD AUTO: 0.2 K/UL — SIGNIFICANT CHANGE UP (ref 0–0.5)
EOSINOPHIL # BLD AUTO: 0.3 K/UL — SIGNIFICANT CHANGE UP (ref 0–0.5)
EOSINOPHIL NFR BLD AUTO: 1 % — SIGNIFICANT CHANGE UP (ref 0–6)
EOSINOPHIL NFR BLD AUTO: 2 % — SIGNIFICANT CHANGE UP (ref 0–6)
FIBRINOGEN PPP-MCNC: 539 MG/DL — HIGH (ref 350–510)
FIBRINOGEN PPP-MCNC: 558 MG/DL — HIGH (ref 350–510)
GLUCOSE SERPL-MCNC: 112 MG/DL — HIGH (ref 70–99)
GLUCOSE SERPL-MCNC: 119 MG/DL — HIGH (ref 70–99)
HCT VFR BLD CALC: 28.5 % — LOW (ref 34.5–45)
HCT VFR BLD CALC: 28.6 % — LOW (ref 34.5–45)
HCT VFR BLD CALC: 28.8 % — LOW (ref 34.5–45)
HGB BLD-MCNC: 10 G/DL — LOW (ref 11.5–15.5)
HGB BLD-MCNC: 9.9 G/DL — LOW (ref 11.5–15.5)
HGB BLD-MCNC: 9.9 G/DL — LOW (ref 11.5–15.5)
INR BLD: 0.81 RATIO — LOW (ref 0.88–1.16)
INR BLD: 0.82 RATIO — LOW (ref 0.88–1.16)
LDH SERPL L TO P-CCNC: 252 U/L — HIGH (ref 50–242)
LDH SERPL L TO P-CCNC: 261 U/L — HIGH (ref 50–242)
LYMPHOCYTES # BLD AUTO: 15 % — SIGNIFICANT CHANGE UP (ref 13–44)
LYMPHOCYTES # BLD AUTO: 22.2 % — SIGNIFICANT CHANGE UP (ref 13–44)
LYMPHOCYTES # BLD AUTO: 23.9 % — SIGNIFICANT CHANGE UP (ref 13–44)
LYMPHOCYTES # BLD AUTO: 3.3 K/UL — SIGNIFICANT CHANGE UP (ref 1–3.3)
LYMPHOCYTES # BLD AUTO: 3.5 K/UL — HIGH (ref 1–3.3)
LYMPHOCYTES # BLD AUTO: 3.7 K/UL — HIGH (ref 1–3.3)
MAGNESIUM SERPL-MCNC: 4.6 MG/DL — HIGH (ref 1.6–2.6)
MAGNESIUM SERPL-MCNC: 5.2 MG/DL — HIGH (ref 1.6–2.6)
MAGNESIUM SERPL-MCNC: 5.8 MG/DL — HIGH (ref 1.6–2.6)
MCHC RBC-ENTMCNC: 31.1 PG — SIGNIFICANT CHANGE UP (ref 27–34)
MCHC RBC-ENTMCNC: 31.3 PG — SIGNIFICANT CHANGE UP (ref 27–34)
MCHC RBC-ENTMCNC: 31.9 PG — SIGNIFICANT CHANGE UP (ref 27–34)
MCHC RBC-ENTMCNC: 34.2 GM/DL — SIGNIFICANT CHANGE UP (ref 32–36)
MCHC RBC-ENTMCNC: 34.6 GM/DL — SIGNIFICANT CHANGE UP (ref 32–36)
MCHC RBC-ENTMCNC: 35.2 GM/DL — SIGNIFICANT CHANGE UP (ref 32–36)
MCV RBC AUTO: 90.4 FL — SIGNIFICANT CHANGE UP (ref 80–100)
MCV RBC AUTO: 90.5 FL — SIGNIFICANT CHANGE UP (ref 80–100)
MCV RBC AUTO: 90.9 FL — SIGNIFICANT CHANGE UP (ref 80–100)
MONOCYTES # BLD AUTO: 1 K/UL — HIGH (ref 0–0.9)
MONOCYTES # BLD AUTO: 1.2 K/UL — HIGH (ref 0–0.9)
MONOCYTES # BLD AUTO: 1.2 K/UL — HIGH (ref 0–0.9)
MONOCYTES NFR BLD AUTO: 12 % — SIGNIFICANT CHANGE UP (ref 2–14)
MONOCYTES NFR BLD AUTO: 7.8 % — SIGNIFICANT CHANGE UP (ref 2–14)
MONOCYTES NFR BLD AUTO: 8.3 % — SIGNIFICANT CHANGE UP (ref 2–14)
NEUTROPHILS # BLD AUTO: 10.2 K/UL — HIGH (ref 1.8–7.4)
NEUTROPHILS # BLD AUTO: 10.6 K/UL — HIGH (ref 1.8–7.4)
NEUTROPHILS # BLD AUTO: 9.7 K/UL — HIGH (ref 1.8–7.4)
NEUTROPHILS NFR BLD AUTO: 66.2 % — SIGNIFICANT CHANGE UP (ref 43–77)
NEUTROPHILS NFR BLD AUTO: 67.9 % — SIGNIFICANT CHANGE UP (ref 43–77)
NEUTROPHILS NFR BLD AUTO: 71 % — SIGNIFICANT CHANGE UP (ref 43–77)
PLATELET # BLD AUTO: 284 K/UL — SIGNIFICANT CHANGE UP (ref 150–400)
PLATELET # BLD AUTO: 286 K/UL — SIGNIFICANT CHANGE UP (ref 150–400)
PLATELET # BLD AUTO: 322 K/UL — SIGNIFICANT CHANGE UP (ref 150–400)
POTASSIUM SERPL-MCNC: 3.7 MMOL/L — SIGNIFICANT CHANGE UP (ref 3.5–5.3)
POTASSIUM SERPL-MCNC: 3.8 MMOL/L — SIGNIFICANT CHANGE UP (ref 3.5–5.3)
POTASSIUM SERPL-SCNC: 3.7 MMOL/L — SIGNIFICANT CHANGE UP (ref 3.5–5.3)
POTASSIUM SERPL-SCNC: 3.8 MMOL/L — SIGNIFICANT CHANGE UP (ref 3.5–5.3)
PROT SERPL-MCNC: 5.9 G/DL — LOW (ref 6–8.3)
PROT SERPL-MCNC: 6.2 G/DL — SIGNIFICANT CHANGE UP (ref 6–8.3)
PROTHROM AB SERPL-ACNC: 9.2 SEC — LOW (ref 10–12.9)
PROTHROM AB SERPL-ACNC: 9.3 SEC — LOW (ref 10–12.9)
RBC # BLD: 3.14 M/UL — LOW (ref 3.8–5.2)
RBC # BLD: 3.17 M/UL — LOW (ref 3.8–5.2)
RBC # BLD: 3.17 M/UL — LOW (ref 3.8–5.2)
RBC # FLD: 13.1 % — SIGNIFICANT CHANGE UP (ref 10.3–14.5)
RBC # FLD: 13.2 % — SIGNIFICANT CHANGE UP (ref 10.3–14.5)
RBC # FLD: 13.5 % — SIGNIFICANT CHANGE UP (ref 10.3–14.5)
SODIUM SERPL-SCNC: 139 MMOL/L — SIGNIFICANT CHANGE UP (ref 135–145)
SODIUM SERPL-SCNC: 140 MMOL/L — SIGNIFICANT CHANGE UP (ref 135–145)
URATE SERPL-MCNC: 6.9 MG/DL — SIGNIFICANT CHANGE UP (ref 2.5–7)
URATE SERPL-MCNC: 7 MG/DL — SIGNIFICANT CHANGE UP (ref 2.5–7)
WBC # BLD: 14.6 K/UL — HIGH (ref 3.8–10.5)
WBC # BLD: 15 K/UL — HIGH (ref 3.8–10.5)
WBC # BLD: 15.5 K/UL — HIGH (ref 3.8–10.5)
WBC # FLD AUTO: 14.6 K/UL — HIGH (ref 3.8–10.5)
WBC # FLD AUTO: 15 K/UL — HIGH (ref 3.8–10.5)
WBC # FLD AUTO: 15.5 K/UL — HIGH (ref 3.8–10.5)

## 2019-01-19 RX ORDER — ACETAMINOPHEN 500 MG
975 TABLET ORAL EVERY 6 HOURS
Qty: 0 | Refills: 0 | Status: DISCONTINUED | OUTPATIENT
Start: 2019-01-19 | End: 2019-01-20

## 2019-01-19 RX ADMIN — Medication 600 MILLIGRAM(S): at 12:02

## 2019-01-19 RX ADMIN — LORATADINE 10 MILLIGRAM(S): 10 TABLET ORAL at 12:54

## 2019-01-19 RX ADMIN — Medication 1 TABLET(S): at 12:04

## 2019-01-19 RX ADMIN — Medication 600 MILLIGRAM(S): at 23:34

## 2019-01-19 RX ADMIN — Medication 50 GM/HR: at 12:54

## 2019-01-19 RX ADMIN — Medication 600 MILLIGRAM(S): at 13:00

## 2019-01-19 NOTE — PROGRESS NOTE ADULT - PROBLEM SELECTOR PLAN 2
-c/w Mg, eval for DC once ast/alt downtrending   -AST/ALT trending q6, overall appear stable  -BP controlled not on meds   [place] PGY3

## 2019-01-20 VITALS
RESPIRATION RATE: 18 BRPM | TEMPERATURE: 99 F | HEART RATE: 90 BPM | SYSTOLIC BLOOD PRESSURE: 119 MMHG | DIASTOLIC BLOOD PRESSURE: 81 MMHG

## 2019-01-20 LAB
ALBUMIN SERPL ELPH-MCNC: 3.2 G/DL — LOW (ref 3.3–5)
ALBUMIN SERPL ELPH-MCNC: 3.4 G/DL — SIGNIFICANT CHANGE UP (ref 3.3–5)
ALP SERPL-CCNC: 184 U/L — HIGH (ref 40–120)
ALP SERPL-CCNC: 203 U/L — HIGH (ref 40–120)
ALT FLD-CCNC: 221 U/L — HIGH (ref 10–45)
ALT FLD-CCNC: 313 U/L — HIGH (ref 10–45)
ANION GAP SERPL CALC-SCNC: 22 MMOL/L — HIGH (ref 5–17)
ANION GAP SERPL CALC-SCNC: 9 MMOL/L — SIGNIFICANT CHANGE UP (ref 5–17)
APTT BLD: 26.9 SEC — LOW (ref 27.5–36.3)
APTT BLD: 27.9 SEC — SIGNIFICANT CHANGE UP (ref 27.5–36.3)
AST SERPL-CCNC: 152 U/L — HIGH (ref 10–40)
AST SERPL-CCNC: 62 U/L — HIGH (ref 10–40)
BASOPHILS # BLD AUTO: 0.1 K/UL — SIGNIFICANT CHANGE UP (ref 0–0.2)
BASOPHILS # BLD AUTO: 0.1 K/UL — SIGNIFICANT CHANGE UP (ref 0–0.2)
BASOPHILS NFR BLD AUTO: 0.5 % — SIGNIFICANT CHANGE UP (ref 0–2)
BASOPHILS NFR BLD AUTO: 0.5 % — SIGNIFICANT CHANGE UP (ref 0–2)
BILIRUB SERPL-MCNC: 0.1 MG/DL — LOW (ref 0.2–1.2)
BILIRUB SERPL-MCNC: 0.1 MG/DL — LOW (ref 0.2–1.2)
BUN SERPL-MCNC: 10 MG/DL — SIGNIFICANT CHANGE UP (ref 7–23)
BUN SERPL-MCNC: 9 MG/DL — SIGNIFICANT CHANGE UP (ref 7–23)
CALCIUM SERPL-MCNC: 8.2 MG/DL — LOW (ref 8.4–10.5)
CALCIUM SERPL-MCNC: 8.6 MG/DL — SIGNIFICANT CHANGE UP (ref 8.4–10.5)
CHLORIDE SERPL-SCNC: 101 MMOL/L — SIGNIFICANT CHANGE UP (ref 96–108)
CHLORIDE SERPL-SCNC: 107 MMOL/L — SIGNIFICANT CHANGE UP (ref 96–108)
CO2 SERPL-SCNC: 18 MMOL/L — LOW (ref 22–31)
CO2 SERPL-SCNC: 23 MMOL/L — SIGNIFICANT CHANGE UP (ref 22–31)
CREAT SERPL-MCNC: 0.69 MG/DL — SIGNIFICANT CHANGE UP (ref 0.5–1.3)
CREAT SERPL-MCNC: 0.74 MG/DL — SIGNIFICANT CHANGE UP (ref 0.5–1.3)
EOSINOPHIL # BLD AUTO: 0.2 K/UL — SIGNIFICANT CHANGE UP (ref 0–0.5)
EOSINOPHIL # BLD AUTO: 0.3 K/UL — SIGNIFICANT CHANGE UP (ref 0–0.5)
EOSINOPHIL NFR BLD AUTO: 1.8 % — SIGNIFICANT CHANGE UP (ref 0–6)
EOSINOPHIL NFR BLD AUTO: 1.9 % — SIGNIFICANT CHANGE UP (ref 0–6)
FIBRINOGEN PPP-MCNC: 480 MG/DL — SIGNIFICANT CHANGE UP (ref 350–510)
FIBRINOGEN PPP-MCNC: 543 MG/DL — HIGH (ref 350–510)
GLUCOSE BLDC GLUCOMTR-MCNC: 95 MG/DL — SIGNIFICANT CHANGE UP (ref 70–99)
GLUCOSE SERPL-MCNC: 11 MG/DL — CRITICAL LOW (ref 70–99)
GLUCOSE SERPL-MCNC: 99 MG/DL — SIGNIFICANT CHANGE UP (ref 70–99)
HCT VFR BLD CALC: 28.7 % — LOW (ref 34.5–45)
HCT VFR BLD CALC: 31.5 % — LOW (ref 34.5–45)
HGB BLD-MCNC: 10.2 G/DL — LOW (ref 11.5–15.5)
HGB BLD-MCNC: 9.6 G/DL — LOW (ref 11.5–15.5)
INR BLD: 0.78 RATIO — LOW (ref 0.88–1.16)
INR BLD: 0.82 RATIO — LOW (ref 0.88–1.16)
LDH SERPL L TO P-CCNC: 213 U/L — SIGNIFICANT CHANGE UP (ref 50–242)
LDH SERPL L TO P-CCNC: 371 U/L — HIGH (ref 50–242)
LYMPHOCYTES # BLD AUTO: 2.8 K/UL — SIGNIFICANT CHANGE UP (ref 1–3.3)
LYMPHOCYTES # BLD AUTO: 21.6 % — SIGNIFICANT CHANGE UP (ref 13–44)
LYMPHOCYTES # BLD AUTO: 23.1 % — SIGNIFICANT CHANGE UP (ref 13–44)
LYMPHOCYTES # BLD AUTO: 3 K/UL — SIGNIFICANT CHANGE UP (ref 1–3.3)
MAGNESIUM SERPL-MCNC: 6.1 MG/DL — HIGH (ref 1.6–2.6)
MCHC RBC-ENTMCNC: 30.5 PG — SIGNIFICANT CHANGE UP (ref 27–34)
MCHC RBC-ENTMCNC: 30.6 PG — SIGNIFICANT CHANGE UP (ref 27–34)
MCHC RBC-ENTMCNC: 32.3 GM/DL — SIGNIFICANT CHANGE UP (ref 32–36)
MCHC RBC-ENTMCNC: 33.5 GM/DL — SIGNIFICANT CHANGE UP (ref 32–36)
MCV RBC AUTO: 91.1 FL — SIGNIFICANT CHANGE UP (ref 80–100)
MCV RBC AUTO: 94.5 FL — SIGNIFICANT CHANGE UP (ref 80–100)
MONOCYTES # BLD AUTO: 0.9 K/UL — SIGNIFICANT CHANGE UP (ref 0–0.9)
MONOCYTES # BLD AUTO: 1 K/UL — HIGH (ref 0–0.9)
MONOCYTES NFR BLD AUTO: 7 % — SIGNIFICANT CHANGE UP (ref 2–14)
MONOCYTES NFR BLD AUTO: 7.7 % — SIGNIFICANT CHANGE UP (ref 2–14)
NEUTROPHILS # BLD AUTO: 8.3 K/UL — HIGH (ref 1.8–7.4)
NEUTROPHILS # BLD AUTO: 9.5 K/UL — HIGH (ref 1.8–7.4)
NEUTROPHILS NFR BLD AUTO: 67 % — SIGNIFICANT CHANGE UP (ref 43–77)
NEUTROPHILS NFR BLD AUTO: 68.9 % — SIGNIFICANT CHANGE UP (ref 43–77)
PLATELET # BLD AUTO: 251 K/UL — SIGNIFICANT CHANGE UP (ref 150–400)
PLATELET # BLD AUTO: 309 K/UL — SIGNIFICANT CHANGE UP (ref 150–400)
POTASSIUM SERPL-MCNC: 4 MMOL/L — SIGNIFICANT CHANGE UP (ref 3.5–5.3)
POTASSIUM SERPL-MCNC: 4.4 MMOL/L — SIGNIFICANT CHANGE UP (ref 3.5–5.3)
POTASSIUM SERPL-SCNC: 4 MMOL/L — SIGNIFICANT CHANGE UP (ref 3.5–5.3)
POTASSIUM SERPL-SCNC: 4.4 MMOL/L — SIGNIFICANT CHANGE UP (ref 3.5–5.3)
PROT SERPL-MCNC: 5.8 G/DL — LOW (ref 6–8.3)
PROT SERPL-MCNC: 6.6 G/DL — SIGNIFICANT CHANGE UP (ref 6–8.3)
PROTHROM AB SERPL-ACNC: 8.9 SEC — LOW (ref 10–12.9)
PROTHROM AB SERPL-ACNC: 9.3 SEC — LOW (ref 10–12.9)
RBC # BLD: 3.15 M/UL — LOW (ref 3.8–5.2)
RBC # BLD: 3.34 M/UL — LOW (ref 3.8–5.2)
RBC # FLD: 13.2 % — SIGNIFICANT CHANGE UP (ref 10.3–14.5)
RBC # FLD: 13.8 % — SIGNIFICANT CHANGE UP (ref 10.3–14.5)
SODIUM SERPL-SCNC: 139 MMOL/L — SIGNIFICANT CHANGE UP (ref 135–145)
SODIUM SERPL-SCNC: 141 MMOL/L — SIGNIFICANT CHANGE UP (ref 135–145)
URATE SERPL-MCNC: 6.8 MG/DL — SIGNIFICANT CHANGE UP (ref 2.5–7)
URATE SERPL-MCNC: 6.9 MG/DL — SIGNIFICANT CHANGE UP (ref 2.5–7)
WBC # BLD: 12.4 K/UL — HIGH (ref 3.8–10.5)
WBC # BLD: 13.7 K/UL — HIGH (ref 3.8–10.5)
WBC # FLD AUTO: 12.4 K/UL — HIGH (ref 3.8–10.5)
WBC # FLD AUTO: 13.7 K/UL — HIGH (ref 3.8–10.5)

## 2019-01-20 PROCEDURE — 88307 TISSUE EXAM BY PATHOLOGIST: CPT

## 2019-01-20 PROCEDURE — 85027 COMPLETE CBC AUTOMATED: CPT

## 2019-01-20 PROCEDURE — 86850 RBC ANTIBODY SCREEN: CPT

## 2019-01-20 PROCEDURE — 81001 URINALYSIS AUTO W/SCOPE: CPT

## 2019-01-20 PROCEDURE — G0463: CPT

## 2019-01-20 PROCEDURE — 59025 FETAL NON-STRESS TEST: CPT

## 2019-01-20 PROCEDURE — 85384 FIBRINOGEN ACTIVITY: CPT

## 2019-01-20 PROCEDURE — 70496 CT ANGIOGRAPHY HEAD: CPT

## 2019-01-20 PROCEDURE — 85730 THROMBOPLASTIN TIME PARTIAL: CPT

## 2019-01-20 PROCEDURE — 84156 ASSAY OF PROTEIN URINE: CPT

## 2019-01-20 PROCEDURE — 84550 ASSAY OF BLOOD/URIC ACID: CPT

## 2019-01-20 PROCEDURE — 83735 ASSAY OF MAGNESIUM: CPT

## 2019-01-20 PROCEDURE — 86901 BLOOD TYPING SEROLOGIC RH(D): CPT

## 2019-01-20 PROCEDURE — 86900 BLOOD TYPING SEROLOGIC ABO: CPT

## 2019-01-20 PROCEDURE — 86780 TREPONEMA PALLIDUM: CPT

## 2019-01-20 PROCEDURE — 70450 CT HEAD/BRAIN W/O DYE: CPT

## 2019-01-20 PROCEDURE — 83615 LACTATE (LD) (LDH) ENZYME: CPT

## 2019-01-20 PROCEDURE — 82962 GLUCOSE BLOOD TEST: CPT

## 2019-01-20 PROCEDURE — 82570 ASSAY OF URINE CREATININE: CPT

## 2019-01-20 PROCEDURE — 80053 COMPREHEN METABOLIC PANEL: CPT

## 2019-01-20 PROCEDURE — 85610 PROTHROMBIN TIME: CPT

## 2019-01-20 PROCEDURE — 59050 FETAL MONITOR W/REPORT: CPT

## 2019-01-20 RX ADMIN — Medication 600 MILLIGRAM(S): at 09:06

## 2019-01-20 RX ADMIN — Medication 600 MILLIGRAM(S): at 09:36

## 2019-01-20 RX ADMIN — Medication 600 MILLIGRAM(S): at 00:30

## 2019-01-20 RX ADMIN — LORATADINE 10 MILLIGRAM(S): 10 TABLET ORAL at 14:11

## 2019-01-20 RX ADMIN — Medication 1 TABLET(S): at 14:10

## 2019-01-20 NOTE — PROGRESS NOTE ADULT - PROBLEM SELECTOR PROBLEM 1
Vaginal delivery
Severe pre-eclampsia, with delivery
Vaginal delivery

## 2019-01-20 NOTE — PROGRESS NOTE ADULT - ASSESSMENT
28y female PPD#1 with peripartum course c/b sPEC, now on Mg x 24 hrs pp for seizure ppx. Doing well this AM.
28  PPD#4 from  after IOL for PEC with severe features. She received Mg intrapartum and for 24 hrs postpartum for seizure prophylaxis. It was restarted yesterday due to uptrending AST/ALT. Remainder of HELLP labs have been WNL. Neurology was consulted on PPD#2 for headache, CT head was negative and headache is now improved.
28y female PPD#2 with peripartum course c/b sPEC, now s/p Mg x 24 hrs pp for seizure ppx. C/o HA this AM, otherwise doing well.
29 yo  PPD#5 with sPEC now s/p a second round of magnesium for seizure prophylaxis given elevated LFTs.  Patient remains asymptomatic.  Blood pressures are well-controlled without antihypertensives.  Patient is hemodynamically stable with pain well-controlled.
29yo PPD#3 s/p . PNC c/b sPEC s/p MgSO4 for seizure prophylaxis HA has improved this AM. Patient is stable and doing well post-partum.

## 2019-01-20 NOTE — PROGRESS NOTE ADULT - ATTENDING COMMENTS
Pt w complaints,  reports that headache is minimal  +OOB    +elliot reg  BPs WNL  D/C home  F/U in office next wk for BP check and redraw LFTs  Instructions reviewed
Patient doing well, no complaints, out of bed.   No HA, CP, SOB  No heavy vaginal bleeding (VB)/normal lochia    ICU Vital Signs Last 24 Hrs  T(C): 36.9 (17 Jan 2019 09:52), Max: 36.9 (17 Jan 2019 09:52)  HR: 97 (17 Jan 2019 09:52) (74 - 100)  BP: 132/89 (17 Jan 2019 09:52) (119/78 - 134/79)  RR: 18 (17 Jan 2019 09:52) (18 - 18)  SpO2: 100% (17 Jan 2019 05:34) (96% - 100%)    POD # 3  w headache for ~ 5 + days, mildly improved w tylenol and elevated LFTs   Neuro consult for constant HA  BP control as needed  repeat HELLP labs in PM  Patient seen and evaluated by me. I agree with resident note unless otherwise stated.   Routine postpartum care, regular diet as tolerated, ambulate and pain control as needed.     KAMAR Payne MD  Attending
Postpartum day 3    Patient without complaints  Headaches are much improved with Motrin  98.5 89  108/67  18  Systolic maximum 132    diastolic maximum 89  Afebrile  Abdomen soft nontender  Fundus firm and nontender   Lochia within normal limits   Extremities - no pain    CBC 17.1/10.5/30.4/286  CMP /17  Creatinine 0.7      Head CT negative      Assessment and plan  Postpartum day 3 status post  after induction of labor for severe preeclampsia at 36 weeks  Her pressures are stable  LFTs remained elevated but stable  Discharge home with followup in one week - follow up LFTs
Pt seen and evaluated  Stable PPD#1 s/p  with PEC/HELLP  Feels great  Cont [id number] hrs  LFTs increased today-repeat in AM  Routine PP Care  [facility] D/C planning for tomorrow.
Pt w no new complaints.  Reports that headache is present but less painful that since admission.  Reports that headache improved w PO analgesia  VSS     BPs WNL  Will repeat labs later this afternoon.  If LFts continue to downtrend will D/C MgSO4

## 2019-01-20 NOTE — PROGRESS NOTE ADULT - SUBJECTIVE AND OBJECTIVE BOX
OB Progress Note PPD#1    Subjective:   Pt seen and examined at bedside. No events overnight. Pain well controlled. Patient ambulating. Passing flatus. Tolerating regular diet. Pt denies fever, chills, chest pain, SOB, nausea, vomiting, lightheadedness, dizziness. She denies any HA, changes in vision, RUQ pain.     Objective:  T(F): 97.9 (19 @ 03:00), Max: 98.4 (01-15-19 @ 16:10)  HR: 93 (19 @ 03:00) (90 - 144)  BP: 103/68 (19 @ 03:00) (99/53 - 128/72)  RR: 16 (19 @ 03:00) (14 - 22)  SpO2: 97% (19 @ 03:00) (97% - 100%)    I&O's Summary    2019 07:  -  15 Hernán 2019 07:00  --------------------------------------------------------  IN: 1000 mL / OUT: 850 mL / NET: 150 mL    15 Hernán 2019 07:  -  2019 05:21  --------------------------------------------------------  IN: 2830.5 mL / OUT: 2500 mL / NET: 330.5 mL    MEDICATIONS  (STANDING):  ampicillin  IVPB      ampicillin  IVPB 1 Gram(s) IV Intermittent every 4 hours  diphtheria/tetanus/pertussis (acellular) Vaccine (ADAcel) 0.5 milliLiter(s) IntraMuscular once  magnesium sulfate Infusion 2 Gm/Hr (50 mL/Hr) IV Continuous <Continuous>  metoclopramide Injectable 10 milliGRAM(s) IV Push once  misoprostol Oral Solution 60 MICROGram(s) Oral every 2 hours  oxyCODONE    IR 5 milliGRAM(s) Oral every 3 hours  oxytocin Infusion 41.667 milliUNIT(s)/Min (125 mL/Hr) IV Continuous <Continuous>  oxytocin Infusion 41.667 milliUNIT(s)/Min (125 mL/Hr) IV Continuous <Continuous>  prenatal multivitamin 1 Tablet(s) Oral daily    MEDICATIONS  (PRN):  dibucaine 1% Ointment 1 Application(s) Topical every 4 hours PRN Perineal Discomfort  diphenhydrAMINE 25 milliGRAM(s) Oral every 6 hours PRN Itching  docusate sodium 100 milliGRAM(s) Oral two times a day PRN Stool Softening  glycerin Suppository - Adult 1 Suppository(s) Rectal at bedtime PRN Constipation  hydrocortisone 1% Cream 1 Application(s) Topical every 4 hours PRN Moderate to Severe Perineal Pain  lanolin Ointment 1 Application(s) Topical every 6 hours PRN Sore Nipples  magnesium hydroxide Suspension 30 milliLiter(s) Oral two times a day PRN Constipation  oxyCODONE    IR 5 milliGRAM(s) Oral every 4 hours PRN Severe Pain (7 -10)  pramoxine 1%/zinc 5% Cream 1 Application(s) Topical every 4 hours PRN Moderate to Severe Perineal Pain  simethicone 80 milliGRAM(s) Chew every 6 hours PRN Gas  witch hazel Pads 1 Application(s) Topical every 4 hours PRN Perineal Discomfort      Physical Exam:  Constitutional: NAD, A+O x3  Abdomen: soft, nondistended, no guarding, no rebound  Extremities: no lower extremity edema or calf tenderness bilaterally; venodynes in place    LABS:    01-15    135    |  102    |  5<L>   ----------------------------<  115<H>  4.0     |  20<L>  |  0.68   01-15    139    |  105    |  6<L>   ----------------------------<  118<H>  3.7     |  20<L>  |  0.59       140    |  105    |  6<L>   ----------------------------<  86     4.0     |  20<L>  |  0.64     Ca    9.1        15 Hernán 2019 10:38  Ca    9.1        15 Hernán 2019 02:23  Ca    9.5        2019 20:04  Mg     5.4       -  Mg     6.3       01-15  Mg     5.4       01-15  Mg     4.0       01-15    TPro  7.4    /  Alb  3.9    /  TBili  0.5    /  DBili  x      /  AST  61<H>  /  ALT  87<H>  /  AlkPhos  256<H>  01-15  TPro  6.5    /  Alb  3.5    /  TBili  0.3    /  DBili  x      /  AST  42<H>  /  ALT  67<H>  /  AlkPhos  213<H>  01-15  TPro  6.8    /  Alb  3.7    /  TBili  0.3    /  DBili  x      /  AST  41<H>  /  ALT  60<H>  /  AlkPhos  219<H>          PT/INR - ( 15 Hernán 2019 19:22 )   PT: 9.9 sec;   INR: 0.87 ratio         PTT - ( 15 Hernán 2019 10:40 )  PTT:25.9 sec  Urinalysis Basic - ( 2019 20:04 )    Color: Yellow / Appearance: Slightly Turbid / S.033 / pH: x  Gluc: x / Ketone: Negative  / Bili: Negative / Urobili: 6 mg/dL   Blood: x / Protein: 30 mg/dL / Nitrite: Negative   Leuk Esterase: Small / RBC: 4 /hpf / WBC 14 /HPF   Sq Epi: x / Non Sq Epi: 9 /hpf / Bacteria: Moderate
OB Progress Note PPD#2    Subjective:   Pt seen and examined at bedside. No events overnight. Pain well controlled. Patient ambulating. Passing flatus. Tolerating regular diet. Pt denies fever, chills, chest pain, SOB, nausea, vomiting, lightheadedness, dizziness. Pt c/o R sided HA associated with some nasal congestion and eye tearing. Pt able to sleep overnight after tylenol/reglan.     Objective:  T(F): 98.1 (01-17-19 @ 05:34), Max: 98.2 (01-16-19 @ 07:00)  HR: 83 (01-17-19 @ 05:34) (74 - 100)  BP: 126/84 (01-17-19 @ 05:34) (115/74 - 134/79)  RR: 18 (01-17-19 @ 05:34) (18 - 18)  SpO2: 100% (01-17-19 @ 05:34) (96% - 100%)    I&O's Summary    15 Hernán 2019 07:01  -  16 Jan 2019 07:00  --------------------------------------------------------  IN: 3218 mL / OUT: 3100 mL / NET: 118 mL    16 Jan 2019 07:01  -  17 Jan 2019 05:38  --------------------------------------------------------  IN: 0 mL / OUT: 1050 mL / NET: -1050 mL      MEDICATIONS  (STANDING):  diphtheria/tetanus/pertussis (acellular) Vaccine (ADAcel) 0.5 milliLiter(s) IntraMuscular once  lactated ringers. 1000 milliLiter(s) (50 mL/Hr) IV Continuous <Continuous>  loratadine 10 milliGRAM(s) Oral daily  metoclopramide Injectable 10 milliGRAM(s) IV Push once  misoprostol Oral Solution 60 MICROGram(s) Oral every 2 hours  oxyCODONE    IR 5 milliGRAM(s) Oral every 3 hours  oxytocin Infusion 41.667 milliUNIT(s)/Min (125 mL/Hr) IV Continuous <Continuous>  oxytocin Infusion 41.667 milliUNIT(s)/Min (125 mL/Hr) IV Continuous <Continuous>  prenatal multivitamin 1 Tablet(s) Oral daily    MEDICATIONS  (PRN):  acetaminophen   Tablet .. 650 milliGRAM(s) Oral every 6 hours PRN Mild Pain (1 - 3)  dibucaine 1% Ointment 1 Application(s) Topical every 4 hours PRN Perineal Discomfort  diphenhydrAMINE 25 milliGRAM(s) Oral every 6 hours PRN Itching  docusate sodium 100 milliGRAM(s) Oral two times a day PRN Stool Softening  glycerin Suppository - Adult 1 Suppository(s) Rectal at bedtime PRN Constipation  hydrocortisone 1% Cream 1 Application(s) Topical every 4 hours PRN Moderate to Severe Perineal Pain  ibuprofen  Tablet. 600 milliGRAM(s) Oral every 6 hours PRN Moderate Pain (4 - 6)  lanolin Ointment 1 Application(s) Topical every 6 hours PRN Sore Nipples  magnesium hydroxide Suspension 30 milliLiter(s) Oral two times a day PRN Constipation  oxyCODONE    IR 5 milliGRAM(s) Oral every 4 hours PRN Severe Pain (7 -10)  pramoxine 1%/zinc 5% Cream 1 Application(s) Topical every 4 hours PRN Moderate to Severe Perineal Pain  simethicone 80 milliGRAM(s) Chew every 6 hours PRN Gas  witch hazel Pads 1 Application(s) Topical every 4 hours PRN Perineal Discomfort      Physical Exam:  Constitutional: NAD, A+O x3  CV: RRR  Lungs: clear to auscultation bilaterally  Abdomen: soft, nondistended, no guarding, no rebound  Extremities: no lower extremity edema or calf tenderness bilaterally    LABS:    01-16    137    |  106    |  6<L>   ----------------------------<  137<H>  4.0     |  20<L>  |  0.64     Ca    8.3<L>      16 Jan 2019 07:52  Mg     5.9       01-16  Mg     5.4       01-16  Mg     6.3       01-15    TPro  6.4    /  Alb  3.3    /  TBili  0.4    /  DBili  x      /  AST  127<H>  /  ALT  183<H>  /  AlkPhos  210<H>  01-16        PT/INR - ( 16 Jan 2019 07:52 )   PT: 9.5 sec;   INR: 0.83 ratio         PTT - ( 16 Jan 2019 07:52 )  PTT:22.1 sec
OB Progress Note:  PPD#3    S: 27yo PPD#3 s/p . Patient feels well. Pain is well controlled. She is tolerating a regular diet and passing flatus. She is voiding spontaneously, and ambulating without difficulty. Denies CP/SOB. Denies lightheadedness/dizziness. Denies N/V. She states that her HA has improved this AM.     O:  Vitals:   Vital Signs Last 24 Hrs  T(C): 36.9 (2019 01:50), Max: 37 (2019 21:54)  T(F): 98.5 (2019 01:50), Max: 98.6 (2019 21:54)  HR: 88 (2019 01:50) (83 - 98)  BP: 107/67 (2019 01:50) (107/67 - 132/89)  BP(mean): --  RR: 16 (2019 01:50) (16 - 18)  SpO2: 100% (2019 05:34) (100% - 100%)    MEDICATIONS  (STANDING):  diphtheria/tetanus/pertussis (acellular) Vaccine (ADAcel) 0.5 milliLiter(s) IntraMuscular once  lactated ringers. 1000 milliLiter(s) (50 mL/Hr) IV Continuous <Continuous>  loratadine 10 milliGRAM(s) Oral daily  methylPREDNISolone sodium succinate Injectable 40 milliGRAM(s) IV Push every 4 hours  metoclopramide Injectable 10 milliGRAM(s) IV Push once  misoprostol Oral Solution 60 MICROGram(s) Oral every 2 hours  oxyCODONE    IR 5 milliGRAM(s) Oral every 3 hours  oxytocin Infusion 41.667 milliUNIT(s)/Min (125 mL/Hr) IV Continuous <Continuous>  oxytocin Infusion 41.667 milliUNIT(s)/Min (125 mL/Hr) IV Continuous <Continuous>  prenatal multivitamin 1 Tablet(s) Oral daily    MEDICATIONS  (PRN):  acetaminophen   Tablet .. 650 milliGRAM(s) Oral every 6 hours PRN Mild Pain (1 - 3)  dibucaine 1% Ointment 1 Application(s) Topical every 4 hours PRN Perineal Discomfort  diphenhydrAMINE 25 milliGRAM(s) Oral every 6 hours PRN Itching  docusate sodium 100 milliGRAM(s) Oral two times a day PRN Stool Softening  glycerin Suppository - Adult 1 Suppository(s) Rectal at bedtime PRN Constipation  hydrocortisone 1% Cream 1 Application(s) Topical every 4 hours PRN Moderate to Severe Perineal Pain  ibuprofen  Tablet. 600 milliGRAM(s) Oral every 6 hours PRN Moderate Pain (4 - 6)  lanolin Ointment 1 Application(s) Topical every 6 hours PRN Sore Nipples  magnesium hydroxide Suspension 30 milliLiter(s) Oral two times a day PRN Constipation  oxyCODONE    IR 5 milliGRAM(s) Oral every 4 hours PRN Severe Pain (7 -10)  pramoxine 1%/zinc 5% Cream 1 Application(s) Topical every 4 hours PRN Moderate to Severe Perineal Pain  simethicone 80 milliGRAM(s) Chew every 6 hours PRN Gas  witch hazel Pads 1 Application(s) Topical every 4 hours PRN Perineal Discomfort      Labs:  Blood type: O Positive  Rubella IgG: RPR: Negative                          10.6<L>   16.3<H> >-----------< 262    (  @ 19:11 )             30.2<L>                        10.1<L>   15.9<H> >-----------< 242    (  @ 06:55 )             29.6<L>                        10.5<L>   19.2<H> >-----------< 264    (  @ 07:52 )             29.4<L>                        10.6<L>   18.4<H> >-----------< 246    ( 01-15 @ 19:41 )             30.4<L>                        12.5   11.8<H> >-----------< 264    ( 01-15 @ 10:38 )             36.0    19 19:11      139  |  106  |  10  ----------------------------<  81  4.1   |  22  |  0.70    19 @ 06:52      140  |  108  |  9   ----------------------------<  81  4.1   |  22  |  0.66    19 @ 07:52      137  |  106  |  6<L>  ----------------------------<  137<H>  4.0   |  20<L>  |  0.64    01-15-19 @ 10:38      135  |  102  |  5<L>  ----------------------------<  115<H>  4.0   |  20<L>  |  0.68        Ca    9.0      2019 19:11  Ca    9.0      2019 06:52  Ca    8.3<L>      2019 07:52  Ca    9.1      15 Hernán 2019 10:38  Mg     5.9<H>     -  Mg     5.4<H>       Mg     6.3<H>     01-15  Mg     5.4<H>     01-15    TPro  6.2  /  Alb  3.4  /  TBili  0.2  /  DBili  x   /  AST  126<H>  /  ALT  241<H>  /  AlkPhos  186<H>  19 @ 19:11  TPro  5.9<L>  /  Alb  3.0<L>  /  TBili  0.2  /  DBili  x   /  AST  116<H>  /  ALT  210<H>  /  AlkPhos  183<H>  19 @ 06:52  TPro  6.4  /  Alb  3.3  /  TBili  0.4  /  DBili  x   /  AST  127<H>  /  ALT  183<H>  /  AlkPhos  210<H>  19 @ 07:52  TPro  7.4  /  Alb  3.9  /  TBili  0.5  /  DBili  x   /  AST  61<H>  /  ALT  87<H>  /  AlkPhos  256<H>  01-15-19 @ 10:38          Physical Exam:  General: NAD  Abdomen: soft, non-tender, non-distended, fundus firm  Vaginal: Lochia wnl  Extremities: No erythema/edema      ARCADIO Gonzalez, PGY-2
PPD#4   Patient seen and examined at bedside, no acute overnight events. No acute complaints, pain well controlled. Headache is improved and she has been sleeping all night.   Patient is ambulating, voiding spontaneously, passing gas, and tolerating regular diet. Pt is breast and bottle feeding her baby.    Vital Signs Last 24 Hours  T(C): 36.6 (01-19-19 @ 01:00), Max: 37.3 (01-18-19 @ 13:53)  HR: 93 (01-19-19 @ 03:00) (89 - 116)  BP: 104/66 (01-19-19 @ 03:00) (104/66 - 139/83)  RR: 17 (01-19-19 @ 03:00) (17 - 18)  SpO2: 96% (01-19-19 @ 03:00) (96% - 100%)    Physical Exam:  General: NAD  Abdomen: Soft, non-tender, non-distended, fundus firm  Pelvic: Lochia wnl  Ext: NTBL  Labs:  Blood type: O Positive  Rubella IgG: RPR: Negative                          9.9<L>   15.5<H> >-----------< 284    ( 01-18 @ 23:18 )             28.8<L>                        9.9<L>   17.2<H> >-----------< 283    ( 01-18 @ 16:24 )             28.7<L>                        10.5<L>   17.1<H> >-----------< 286    ( 01-18 @ 09:48 )             30.4<L>                        10.6<L>   16.3<H> >-----------< 262    ( 01-17 @ 19:11 )             30.2<L>                        10.1<L>   15.9<H> >-----------< 242    ( 01-17 @ 06:55 )             29.6<L>                        10.5<L>   19.2<H> >-----------< 264    ( 01-16 @ 07:52 )             29.4<L>    01-18-19 @ 23:18      140  |  106  |  11  ----------------------------<  143<H>  3.5   |  20<L>  |  0.80    01-18-19 @ 16:24      139  |  106  |  12  ----------------------------<  103<H>  4.0   |  22  |  0.83    01-18-19 @ 09:48      139  |  106  |  11  ----------------------------<  101<H>  4.4   |  21<L>  |  0.67    01-17-19 @ 19:11      139  |  106  |  10  ----------------------------<  81  4.1   |  22  |  0.70    01-17-19 @ 06:52      140  |  108  |  9   ----------------------------<  81  4.1   |  22  |  0.66    01-16-19 @ 07:52      137  |  106  |  6<L>  ----------------------------<  137<H>  4.0   |  20<L>  |  0.64        Ca    8.5      18 Jan 2019 23:18  Ca    9.3      18 Jan 2019 16:24  Ca    9.4      18 Jan 2019 09:48  Ca    9.0      17 Jan 2019 19:11  Ca    9.0      17 Jan 2019 06:52  Ca    8.3<L>      16 Jan 2019 07:52  Mg     4.6<H>     01-18  Mg     5.9<H>     01-16    TPro  6.3  /  Alb  3.4  /  TBili  0.2  /  DBili  x   /  AST  195<H>  /  ALT  340<H>  /  AlkPhos  197<H>  01-18-19 @ 23:18  TPro  6.3  /  Alb  3.5  /  TBili  0.2  /  DBili  x   /  AST  220<H>  /  ALT  332<H>  /  AlkPhos  200<H>  01-18-19 @ 16:24  TPro  6.5  /  Alb  3.4  /  TBili  0.2  /  DBili  x   /  AST  177<H>  /  ALT  289<H>  /  AlkPhos  205<H>  01-18-19 @ 09:48  TPro  6.2  /  Alb  3.4  /  TBili  0.2  /  DBili  x   /  AST  126<H>  /  ALT  241<H>  /  AlkPhos  186<H>  01-17-19 @ 19:11  TPro  5.9<L>  /  Alb  3.0<L>  /  TBili  0.2  /  DBili  x   /  AST  116<H>  /  ALT  210<H>  /  AlkPhos  183<H>  01-17-19 @ 06:52  TPro  6.4  /  Alb  3.3  /  TBili  0.4  /  DBili  x   /  AST  127<H>  /  ALT  183<H>  /  AlkPhos  210<H>  01-16-19 @ 07:52          MEDICATIONS  (STANDING):  diphtheria/tetanus/pertussis (acellular) Vaccine (ADAcel) 0.5 milliLiter(s) IntraMuscular once  lactated ringers. 1000 milliLiter(s) (50 mL/Hr) IV Continuous <Continuous>  loratadine 10 milliGRAM(s) Oral daily  magnesium sulfate Infusion 2 Gm/Hr (50 mL/Hr) IV Continuous <Continuous>  methylPREDNISolone sodium succinate Injectable 40 milliGRAM(s) IV Push every 4 hours  metoclopramide Injectable 10 milliGRAM(s) IV Push once  misoprostol Oral Solution 60 MICROGram(s) Oral every 2 hours  oxyCODONE    IR 5 milliGRAM(s) Oral every 3 hours  oxytocin Infusion 41.667 milliUNIT(s)/Min (125 mL/Hr) IV Continuous <Continuous>  oxytocin Infusion 41.667 milliUNIT(s)/Min (125 mL/Hr) IV Continuous <Continuous>  prenatal multivitamin 1 Tablet(s) Oral daily    MEDICATIONS  (PRN):  acetaminophen   Tablet .. 650 milliGRAM(s) Oral every 6 hours PRN Mild Pain (1 - 3)  dibucaine 1% Ointment 1 Application(s) Topical every 4 hours PRN Perineal Discomfort  diphenhydrAMINE 25 milliGRAM(s) Oral every 6 hours PRN Itching  docusate sodium 100 milliGRAM(s) Oral two times a day PRN Stool Softening  glycerin Suppository - Adult 1 Suppository(s) Rectal at bedtime PRN Constipation  hydrocortisone 1% Cream 1 Application(s) Topical every 4 hours PRN Moderate to Severe Perineal Pain  ibuprofen  Tablet. 600 milliGRAM(s) Oral every 6 hours PRN Moderate Pain (4 - 6)  lanolin Ointment 1 Application(s) Topical every 6 hours PRN Sore Nipples  magnesium hydroxide Suspension 30 milliLiter(s) Oral two times a day PRN Constipation  oxyCODONE    IR 5 milliGRAM(s) Oral every 4 hours PRN Severe Pain (7 -10)  pramoxine 1%/zinc 5% Cream 1 Application(s) Topical every 4 hours PRN Moderate to Severe Perineal Pain  simethicone 80 milliGRAM(s) Chew every 6 hours PRN Gas  witch hazel Pads 1 Application(s) Topical every 4 hours PRN Perineal Discomfort
R3 OB Postpartum Note    Patient seen and examined at bedside, no acute overnight events. No acute complaints, pain well controlled.  Patient denies headache, blurry vision, and epigastric pain.  Patient is ambulating, voiding spontaneously, passing gas, and tolerating regular diet.     Vital Signs Last 24 Hours  T(C): 36.6 (01-20-19 @ 00:42), Max: 37.1 (01-19-19 @ 21:07)  HR: 85 (01-20-19 @ 00:42) (82 - 93)  BP: 120/83 (01-20-19 @ 00:42) (118/77 - 134/86)  RR: 18 (01-20-19 @ 00:42) (17 - 18)  SpO2: 96% (01-20-19 @ 00:42) (96% - 100%)    Physical Exam:  General: NAD  Abdomen: Soft, non-tender, non-distended, fundus firm  Pelvic: Lochia wnl  Ext: NTBL  Labs:  Blood type: O Positive  Rubella IgG: RPR: Negative                          9.9<L>   15.0<H> >-----------< 322    ( 01-19 @ 18:32 )             28.6<L>                        10.0<L>   14.6<H> >-----------< 286    ( 01-19 @ 05:36 )             28.5<L>                        9.9<L>   15.5<H> >-----------< 284    ( 01-18 @ 23:18 )             28.8<L>                        9.9<L>   17.2<H> >-----------< 283    ( 01-18 @ 16:24 )             28.7<L>                        10.5<L>   17.1<H> >-----------< 286    ( 01-18 @ 09:48 )             30.4<L>                        10.6<L>   16.3<H> >-----------< 262    ( 01-17 @ 19:11 )             30.2<L>                        10.1<L>   15.9<H> >-----------< 242    ( 01-17 @ 06:55 )             29.6<L>    01-19-19 @ 18:32      139  |  105  |  8   ----------------------------<  112<H>  3.8   |  23  |  0.73    01-19-19 @ 05:36      140  |  108  |  9   ----------------------------<  119<H>  3.7   |  22  |  0.65    01-18-19 @ 23:18      140  |  106  |  11  ----------------------------<  143<H>  3.5   |  20<L>  |  0.80    01-18-19 @ 16:24      139  |  106  |  12  ----------------------------<  103<H>  4.0   |  22  |  0.83    01-18-19 @ 09:48      139  |  106  |  11  ----------------------------<  101<H>  4.4   |  21<L>  |  0.67    01-17-19 @ 19:11      139  |  106  |  10  ----------------------------<  81  4.1   |  22  |  0.70    01-17-19 @ 06:52      140  |  108  |  9   ----------------------------<  81  4.1   |  22  |  0.66        Ca    7.8<L>      19 Jan 2019 18:32  Ca    7.8<L>      19 Jan 2019 05:36  Ca    8.5      18 Jan 2019 23:18  Ca    9.3      18 Jan 2019 16:24  Ca    9.4      18 Jan 2019 09:48  Ca    9.0      17 Jan 2019 19:11  Ca    9.0      17 Jan 2019 06:52  Mg     5.8<H>     01-19  Mg     5.2<H>     01-19  Mg     4.6<H>     01-18    TPro  6.2  /  Alb  3.3  /  TBili  0.1<L>  /  DBili  x   /  AST  114<H>  /  ALT  292<H>  /  AlkPhos  195<H>  01-19-19 @ 18:32  TPro  5.9<L>  /  Alb  3.1<L>  /  TBili  0.1<L>  /  DBili  x   /  AST  158<H>  /  ALT  313<H>  /  AlkPhos  188<H>  01-19-19 @ 05:36  TPro  6.3  /  Alb  3.4  /  TBili  0.2  /  DBili  x   /  AST  195<H>  /  ALT  340<H>  /  AlkPhos  197<H>  01-18-19 @ 23:18  TPro  6.3  /  Alb  3.5  /  TBili  0.2  /  DBili  x   /  AST  220<H>  /  ALT  332<H>  /  AlkPhos  200<H>  01-18-19 @ 16:24  TPro  6.5  /  Alb  3.4  /  TBili  0.2  /  DBili  x   /  AST  177<H>  /  ALT  289<H>  /  AlkPhos  205<H>  01-18-19 @ 09:48  TPro  6.2  /  Alb  3.4  /  TBili  0.2  /  DBili  x   /  AST  126<H>  /  ALT  241<H>  /  AlkPhos  186<H>  01-17-19 @ 19:11  TPro  5.9<L>  /  Alb  3.0<L>  /  TBili  0.2  /  DBili  x   /  AST  116<H>  /  ALT  210<H>  /  AlkPhos  183<H>  01-17-19 @ 06:52          MEDICATIONS  (STANDING):  acetaminophen   Tablet .. 975 milliGRAM(s) Oral every 6 hours  diphtheria/tetanus/pertussis (acellular) Vaccine (ADAcel) 0.5 milliLiter(s) IntraMuscular once  loratadine 10 milliGRAM(s) Oral daily  oxyCODONE    IR 5 milliGRAM(s) Oral every 3 hours  oxytocin Infusion 41.667 milliUNIT(s)/Min (125 mL/Hr) IV Continuous <Continuous>  prenatal multivitamin 1 Tablet(s) Oral daily    MEDICATIONS  (PRN):  acetaminophen   Tablet .. 650 milliGRAM(s) Oral every 6 hours PRN Mild Pain (1 - 3)  dibucaine 1% Ointment 1 Application(s) Topical every 4 hours PRN Perineal Discomfort  diphenhydrAMINE 25 milliGRAM(s) Oral every 6 hours PRN Itching  docusate sodium 100 milliGRAM(s) Oral two times a day PRN Stool Softening  glycerin Suppository - Adult 1 Suppository(s) Rectal at bedtime PRN Constipation  hydrocortisone 1% Cream 1 Application(s) Topical every 4 hours PRN Moderate to Severe Perineal Pain  ibuprofen  Tablet. 600 milliGRAM(s) Oral every 6 hours PRN Moderate Pain (4 - 6)  lanolin Ointment 1 Application(s) Topical every 6 hours PRN Sore Nipples  magnesium hydroxide Suspension 30 milliLiter(s) Oral two times a day PRN Constipation  oxyCODONE    IR 5 milliGRAM(s) Oral every 4 hours PRN Severe Pain (7 -10)  pramoxine 1%/zinc 5% Cream 1 Application(s) Topical every 4 hours PRN Moderate to Severe Perineal Pain  simethicone 80 milliGRAM(s) Chew every 6 hours PRN Gas  witch hazel Pads 1 Application(s) Topical every 4 hours PRN Perineal Discomfort

## 2019-01-20 NOTE — PROGRESS NOTE ADULT - PROBLEM SELECTOR PLAN 1
CV: Hemodynamically stable, AM HELLP labs  Pulm: Saturating well on RA. Increase incentive spirometry.  GI: Tolerating reg diet  : Voiding  Heme: Continue venodynes for DVT ppx. Increase OOB.    Neuro: Continue oral meds for pain control.    Malissa, pgy3
-Continue to monitor BPs and Symptoms  -f/u AM HELLP labs  -Continue with regular diet  -Heparin, SCDs, and ambulation for DVT ppx  -Analgesia as needed    MICHAEL Coleman PGY3
-c/w routine postpartum care  -reg diet  -encourage breastfeeding
CV: Hemodynamically stable, AM HELLP labs to trend LFTs, continue to monitor BPs  Pulm: Saturating well on RA. Increase incentive spirometry.  GI: Tolerating reg diet  : Voiding  Heme: Continue venodynes for DVT ppx. Increase OOB.    Neuro: Continue oral meds for pain control. Will try claritin for possible sinus HA, continue to monitor HA/monitor for S&S of worsening sPEC    Malissa, pgy3
CV: Hemodynamically stable, AM HELLP labs to trend LFTs, continue to monitor BPs  Pulm: Saturating well on RA. Increase incentive spirometry.  GI: Tolerating reg diet  : Voiding  Heme: Continue venodynes for DVT ppx. Increase OOB.    Neuro: Improvement of HA. Will get CT head/venogram this AM. Continue oral meds for pain control     Erin, pgy2

## 2019-01-22 LAB — SURGICAL PATHOLOGY STUDY: SIGNIFICANT CHANGE UP

## 2019-01-24 ENCOUNTER — APPOINTMENT (OUTPATIENT)
Dept: OBGYN | Facility: CLINIC | Age: 29
End: 2019-01-24
Payer: COMMERCIAL

## 2019-01-24 VITALS — DIASTOLIC BLOOD PRESSURE: 76 MMHG | SYSTOLIC BLOOD PRESSURE: 127 MMHG

## 2019-01-24 PROCEDURE — 99080 SPECIAL REPORTS OR FORMS: CPT

## 2019-01-25 LAB
ALBUMIN SERPL ELPH-MCNC: 4 G/DL
ALP BLD-CCNC: 176 U/L
ALT SERPL-CCNC: 65 U/L
AST SERPL-CCNC: 21 U/L
BILIRUB DIRECT SERPL-MCNC: 0.1 MG/DL
BILIRUB INDIRECT SERPL-MCNC: 0.1 MG/DL
BILIRUB SERPL-MCNC: 0.2 MG/DL
PROT SERPL-MCNC: 6.3 G/DL

## 2019-02-07 ENCOUNTER — APPOINTMENT (OUTPATIENT)
Dept: OBGYN | Facility: CLINIC | Age: 29
End: 2019-02-07
Payer: COMMERCIAL

## 2019-02-07 VITALS
SYSTOLIC BLOOD PRESSURE: 120 MMHG | BODY MASS INDEX: 39.9 KG/M2 | WEIGHT: 228 LBS | DIASTOLIC BLOOD PRESSURE: 70 MMHG | HEIGHT: 63.5 IN

## 2019-02-07 PROCEDURE — 0503F POSTPARTUM CARE VISIT: CPT

## 2019-02-07 NOTE — HISTORY OF PRESENT ILLNESS
[Postpartum Follow Up] : postpartum follow up [Delivery Date: ___] : on [unfilled] [] : delivered by vaginal delivery [Breastfeeding] : currently nursing [BF with Difficulty] : nursing without difficulty [Abdominal Pain] : no abdominal pain [Back Pain] : no back pain [Breast Pain] : no breast pain [BreastFeeding Problems] : breastfeeding problems [Chest Pain] : no chest pain [Cracked Nipples] : no cracked nipples [S/Sx PP Depression] : no signs/symptoms of postpartum depression [Heavy Bleeding] : no heavy bleeding [Irregular Bleeding] : irregular bleeding [Leg Pain] : no leg pain [Shortness of Breath] : no shortness of breath [Suicidal Ideation] : no suicidal ideation [Vaginal Discharge] : no vaginal discharge [None] : No associated symptoms are reported [Back to Normal] : is still enlarged [Mild] : mild vaginal bleeding [Not Done] : Examination of breasts not done [Doing Well] : is doing well [FreeTextEntry8] : F/U - Patient with help syndrome - elevated liver function tests [FreeTextEntry9] : 28-year-old  EDC 2/11/19\par 36-1/7 weeks\par \par Prenatal Issues\par ·	Hyperemesis - nausea qod  minimal vomitting\par ·	History of asthma - stable in pregnancy\par Severe preeclampsia\par ·	Pertussis vaccination - received December 10, 2018\par ·	Influenza vaccination - declined December 10, 2018\par \par Prenatal Labs\par \par 1.	Blood type O+\par 2.	HIV negative\par 3.	Hepatitis B surface antigen negative\par 4.	Hepatitis C negative\par 5.	TSH 0.73\par 6.	Rubella immune\par 7.	Varicella immune\par 8.	QG negative\par 9.	Electrophoresis normal pattern\par 10.	Pap smear and HPV negative July 2018\par 11.	Gonorrhea and Chlamydia negative July 2018\par \par \par 12.	Genetic screening\par a.	Ultrascreen negative > Modified sequential negative\par b.	Fragile X negative\par c.	SMA reduced to carrier risk   \par d.	Cystic fibrosis negative\par e.	NIPS (-) male\par f.	Level II ultrasound anterior placenta, cervix long, anatomy normal\par 13.	Third trimester\par a.	CBC 11.9/11.3/34.3/2077\par b.	HIV negative\par c.	\par  [de-identified] : Hospital Course	 \par patient was admitted at 36 weeks for severe preeclampsia - underwent successful \par induction of labor -  \par \par Postpartum course was uncomplicated except for elevated LFTs. \par Patient also with report of headache - CT scan negative \par \par Addendum: \par HELLP labs drawn this AM need to show rising LFTs, afternoon labs revealed LFTs \par 220/332.  MFM was suggested as initiating magnesium therapy.  Patient's \par discharge was reversed.  She will stanued observation and magnesium therapy.  [de-identified] : return to office in 4 weeks for postpartum evaluation - check liver function studies today

## 2019-02-07 NOTE — ATTENDING NOTE
[FreeTextEntry2] : patient doing well\par Check LFTs for resolution\par Return to officeeeks for rout postpartum evaluation

## 2019-02-19 LAB
ALBUMIN SERPL ELPH-MCNC: 4.1 G/DL
ALP BLD-CCNC: 135 U/L
ALT SERPL-CCNC: 20 U/L
ANION GAP SERPL CALC-SCNC: 13 MMOL/L
AST SERPL-CCNC: 16 U/L
BILIRUB SERPL-MCNC: <0.2 MG/DL
BUN SERPL-MCNC: 11 MG/DL
CALCIUM SERPL-MCNC: 9.4 MG/DL
CHLORIDE SERPL-SCNC: 108 MMOL/L
CO2 SERPL-SCNC: 24 MMOL/L
CREAT SERPL-MCNC: 0.95 MG/DL
GLUCOSE SERPL-MCNC: 86 MG/DL
POTASSIUM SERPL-SCNC: 4.1 MMOL/L
PROT SERPL-MCNC: 6.5 G/DL
SODIUM SERPL-SCNC: 145 MMOL/L

## 2019-03-05 ENCOUNTER — LABORATORY RESULT (OUTPATIENT)
Age: 29
End: 2019-03-05

## 2019-03-05 ENCOUNTER — APPOINTMENT (OUTPATIENT)
Dept: FAMILY MEDICINE | Facility: CLINIC | Age: 29
End: 2019-03-05
Payer: COMMERCIAL

## 2019-03-05 ENCOUNTER — APPOINTMENT (OUTPATIENT)
Dept: OBGYN | Facility: CLINIC | Age: 29
End: 2019-03-05
Payer: COMMERCIAL

## 2019-03-05 VITALS
HEIGHT: 63 IN | BODY MASS INDEX: 40.75 KG/M2 | SYSTOLIC BLOOD PRESSURE: 120 MMHG | DIASTOLIC BLOOD PRESSURE: 78 MMHG | WEIGHT: 230 LBS

## 2019-03-05 VITALS
BODY MASS INDEX: 40.57 KG/M2 | WEIGHT: 229 LBS | DIASTOLIC BLOOD PRESSURE: 80 MMHG | SYSTOLIC BLOOD PRESSURE: 126 MMHG | HEIGHT: 63 IN

## 2019-03-05 DIAGNOSIS — Z87.59 PERSONAL HISTORY OF OTHER COMPLICATIONS OF PREGNANCY, CHILDBIRTH AND THE PUERPERIUM: ICD-10-CM

## 2019-03-05 DIAGNOSIS — Z34.03 ENCOUNTER FOR SUPERVISION OF NORMAL FIRST PREGNANCY, THIRD TRIMESTER: ICD-10-CM

## 2019-03-05 DIAGNOSIS — O14.23: ICD-10-CM

## 2019-03-05 DIAGNOSIS — Z88.9 ALLERGY STATUS TO UNSPECIFIED DRUGS, MEDICAMENTS AND BIOLOGICAL SUBSTANCES: ICD-10-CM

## 2019-03-05 DIAGNOSIS — Z82.49 FAMILY HISTORY OF ISCHEMIC HEART DISEASE AND OTHER DISEASES OF THE CIRCULATORY SYSTEM: ICD-10-CM

## 2019-03-05 DIAGNOSIS — H54.7 UNSPECIFIED VISUAL LOSS: ICD-10-CM

## 2019-03-05 DIAGNOSIS — Z83.3 FAMILY HISTORY OF DIABETES MELLITUS: ICD-10-CM

## 2019-03-05 PROCEDURE — 0503F POSTPARTUM CARE VISIT: CPT

## 2019-03-05 PROCEDURE — 99385 PREV VISIT NEW AGE 18-39: CPT | Mod: 25

## 2019-03-05 PROCEDURE — 36415 COLL VENOUS BLD VENIPUNCTURE: CPT

## 2019-03-05 RX ORDER — ONDANSETRON 8 MG/1
8 TABLET, ORALLY DISINTEGRATING ORAL EVERY 8 HOURS
Qty: 30 | Refills: 2 | Status: DISCONTINUED | COMMUNITY
Start: 2018-11-13 | End: 2019-03-05

## 2019-03-05 RX ORDER — DOXYLAMINE SUCCINATE AND PYRIDOXINE HYDROCHLORIDE 10; 10 MG/1; MG/1
10-10 TABLET, DELAYED RELEASE ORAL
Qty: 30 | Refills: 1 | Status: DISCONTINUED | COMMUNITY
Start: 2018-07-06 | End: 2019-03-05

## 2019-03-05 RX ORDER — OMEPRAZOLE 40 MG/1
40 CAPSULE, DELAYED RELEASE ORAL
Qty: 90 | Refills: 2 | Status: DISCONTINUED | COMMUNITY
Start: 2018-11-13 | End: 2019-03-05

## 2019-03-05 RX ORDER — ASCORBIC ACID, CALCIUM CITRATE, IRON, CHOLECALCIFEROL, PYRIDOXINE HYDROCHLORIDE, AND FOLIC ACID 20-1-25 MG
20-1 MG & KIT ORAL
Qty: 30 | Refills: 5 | Status: DISCONTINUED | COMMUNITY
Start: 2018-07-06 | End: 2019-03-05

## 2019-03-05 NOTE — PLAN
[FreeTextEntry1] : Will follow-up labwork.  Patient agreeable to STD testing. \par \par Advised to try trial of heat/warm compresses for muscle tenderness as needed.  Advised to cover compress, not place directly on skin and not to apply for more than 15 minutes at a time, advised trial of gentle stretching for back and hip pain.  If not improving, may require imaging.  Patient expressed understanding.\par \par Given referral to Pulmonology for evaluation of possible sleep apnea. \par \par Ms. Hatfield expressed understanding of the plan.  \par

## 2019-03-05 NOTE — HISTORY OF PRESENT ILLNESS
[Postpartum Follow Up] : postpartum follow up [Complications:___] : complications include: [unfilled] [Last Pap Date: ___] : Last Pap Date: [unfilled] [] : delivered by vaginal delivery [Breastfeeding] : currently nursing [Back to Normal] : is back to normal in size [None] : no vaginal bleeding [Examination Of The Breasts] : breasts are normal [Doing Well] : is doing well [Cervix Sample Taken] : cervical sample not taken for a Pap smear [FreeTextEntry9] : Severe PEC with HELLP

## 2019-03-05 NOTE — HEALTH RISK ASSESSMENT
[Patient reported PAP Smear was normal] : Patient reported PAP Smear was normal [With Family] : lives with family [# of Members in Household ___] :  household currently consist of [unfilled] member(s) [Employed] : employed [# Of Children ___] : has [unfilled] children [Fully functional (bathing, dressing, toileting, transferring, walking, feeding)] : Fully functional (bathing, dressing, toileting, transferring, walking, feeding) [Fully functional (using the telephone, shopping, preparing meals, housekeeping, doing laundry, using] : Fully functional and needs no help or supervision to perform IADLs (using the telephone, shopping, preparing meals, housekeeping, doing laundry, using transportation, managing medications and managing finances) [] : No [de-identified] : Gyn-follow-up [de-identified] : Used to do yoga, elliptical [de-identified] : Eats fried foods, drinks soda, tries to limit caffeine.  [Reports changes in hearing] : Reports no changes in hearing [Reports changes in vision] : Reports no changes in vision [Reports normal functional visual acuity (ie: able to read med bottle)] : Reports poor functional visual acuity.  [Reports changes in dental health] : Reports no changes in dental health [PapSmearDate] : 07/2018 [FreeTextEntry2] : Maternity Leave until May;  [de-identified] : wears glasses for distance, does not have them with her today [de-identified] : needs to schedule appointment

## 2019-03-05 NOTE — HISTORY OF PRESENT ILLNESS
[FreeTextEntry1] : Here for annual physical and to establish care.  [de-identified] : Here for annual exam and to establish care. \par History of Asthma; allergy to pepto-bismol and ibuprofen requiring epi-pen. \par \par Delivered son January 15th.  Breastfeeding and bottle feeding.   Has follow-up appointment with gynecology today.  Had pre-eclampsia with delivery. \par \par Notes she has had some back and right hip pain.  Denies injury-has not tried anything for the pain.  \par Also reports ongoing snoring and not feeling well rested after waking up.  States the snoring has been ongoing for many months.

## 2019-03-05 NOTE — PHYSICAL EXAM
[No Acute Distress] : no acute distress [Well Nourished] : well nourished [Well Developed] : well developed [Well-Appearing] : well-appearing [Normal Sclera/Conjunctiva] : normal sclera/conjunctiva [PERRL] : pupils equal round and reactive to light [EOMI] : extraocular movements intact [20/___] : both eyes 20/[unfilled] [Normal Outer Ear/Nose] : the outer ears and nose were normal in appearance [Normal Oropharynx] : the oropharynx was normal [Normal TMs] : both tympanic membranes were normal [Supple] : supple [No Lymphadenopathy] : no lymphadenopathy [Thyroid Normal, No Nodules] : the thyroid was normal and there were no nodules present [No Respiratory Distress] : no respiratory distress  [Clear to Auscultation] : lungs were clear to auscultation bilaterally [No Accessory Muscle Use] : no accessory muscle use [Normal Rate] : normal rate  [Regular Rhythm] : with a regular rhythm [Normal S1, S2] : normal S1 and S2 [No Edema] : there was no peripheral edema [No Extremity Clubbing/Cyanosis] : no extremity clubbing/cyanosis [Declined Breast Exam] : declined breast exam  [Soft] : abdomen soft [Non Tender] : non-tender [Non-distended] : non-distended [No Masses] : no abdominal mass palpated [Normal Bowel Sounds] : normal bowel sounds [Grossly Normal Strength/Tone] : grossly normal strength/tone [Normal Affect] : the affect was normal [Alert and Oriented x3] : oriented to person, place, and time [Normal Mood] : the mood was normal [de-identified] : Paraspinal tenderness to palpation upper lumbar, lower thoracic.  SLR negative bilaterally, pelvis stable.  Leg flexion and extension intact.

## 2019-03-07 LAB
25(OH)D3 SERPL-MCNC: 32.6 NG/ML
ALBUMIN SERPL ELPH-MCNC: 4.5 G/DL
ALP BLD-CCNC: 141 U/L
ALT SERPL-CCNC: 22 U/L
ANION GAP SERPL CALC-SCNC: 13 MMOL/L
APPEARANCE: CLEAR
AST SERPL-CCNC: 16 U/L
BASOPHILS # BLD AUTO: 0.04 K/UL
BASOPHILS NFR BLD AUTO: 0.5 %
BILIRUB SERPL-MCNC: 0.2 MG/DL
BILIRUBIN URINE: NEGATIVE
BLOOD URINE: NEGATIVE
BUN SERPL-MCNC: 16 MG/DL
C TRACH RRNA SPEC QL NAA+PROBE: NOT DETECTED
CALCIUM SERPL-MCNC: 9.9 MG/DL
CHLORIDE SERPL-SCNC: 106 MMOL/L
CHOLEST SERPL-MCNC: 197 MG/DL
CHOLEST/HDLC SERPL: 3.2 RATIO
CO2 SERPL-SCNC: 26 MMOL/L
COLOR: NORMAL
CREAT SERPL-MCNC: 0.95 MG/DL
EOSINOPHIL # BLD AUTO: 0.13 K/UL
EOSINOPHIL NFR BLD AUTO: 1.6 %
GLUCOSE QUALITATIVE U: NEGATIVE
GLUCOSE SERPL-MCNC: 75 MG/DL
HBA1C MFR BLD HPLC: 5.4 %
HCT VFR BLD CALC: 38.6 %
HDLC SERPL-MCNC: 61 MG/DL
HGB BLD-MCNC: 12 G/DL
HIV1+2 AB SPEC QL IA.RAPID: NONREACTIVE
HSV 1+2 IGG SER IA-IMP: NEGATIVE
HSV 1+2 IGG SER IA-IMP: NEGATIVE
HSV1 IGG SER QL: 0.14 INDEX
HSV2 IGG SER QL: 0.16 INDEX
IMM GRANULOCYTES NFR BLD AUTO: 0.4 %
KETONES URINE: NEGATIVE
LDLC SERPL CALC-MCNC: 119 MG/DL
LEUKOCYTE ESTERASE URINE: ABNORMAL
LYMPHOCYTES # BLD AUTO: 2.97 K/UL
LYMPHOCYTES NFR BLD AUTO: 35.6 %
MAN DIFF?: NORMAL
MCHC RBC-ENTMCNC: 27.8 PG
MCHC RBC-ENTMCNC: 31.1 GM/DL
MCV RBC AUTO: 89.4 FL
MONOCYTES # BLD AUTO: 0.48 K/UL
MONOCYTES NFR BLD AUTO: 5.7 %
N GONORRHOEA RRNA SPEC QL NAA+PROBE: NOT DETECTED
NEUTROPHILS # BLD AUTO: 4.7 K/UL
NEUTROPHILS NFR BLD AUTO: 56.2 %
NITRITE URINE: NEGATIVE
PH URINE: 6
PLATELET # BLD AUTO: 296 K/UL
POTASSIUM SERPL-SCNC: 4.3 MMOL/L
PROT SERPL-MCNC: 7.3 G/DL
PROTEIN URINE: NEGATIVE
RBC # BLD: 4.32 M/UL
RBC # FLD: 12.7 %
SODIUM SERPL-SCNC: 145 MMOL/L
SOURCE AMPLIFICATION: NORMAL
SPECIFIC GRAVITY URINE: 1.02
T PALLIDUM AB SER QL IA: NEGATIVE
TRIGL SERPL-MCNC: 85 MG/DL
TSH SERPL-ACNC: 1.08 UIU/ML
UROBILINOGEN URINE: NORMAL
WBC # FLD AUTO: 8.35 K/UL

## 2019-03-08 ENCOUNTER — OTHER (OUTPATIENT)
Age: 29
End: 2019-03-08

## 2019-05-06 ENCOUNTER — TRANSCRIPTION ENCOUNTER (OUTPATIENT)
Age: 29
End: 2019-05-06

## 2019-05-22 ENCOUNTER — APPOINTMENT (OUTPATIENT)
Dept: FAMILY MEDICINE | Facility: CLINIC | Age: 29
End: 2019-05-22
Payer: COMMERCIAL

## 2019-05-22 VITALS
SYSTOLIC BLOOD PRESSURE: 126 MMHG | OXYGEN SATURATION: 97 % | DIASTOLIC BLOOD PRESSURE: 76 MMHG | RESPIRATION RATE: 16 BRPM | HEART RATE: 81 BPM

## 2019-05-22 VITALS — TEMPERATURE: 98 F

## 2019-05-22 DIAGNOSIS — M54.9 DORSALGIA, UNSPECIFIED: ICD-10-CM

## 2019-05-22 LAB
BILIRUB UR QL STRIP: NEGATIVE
GLUCOSE UR-MCNC: NEGATIVE
HCG UR QL: 0.2 EU/DL
HCG UR QL: NEGATIVE
HGB UR QL STRIP.AUTO: NEGATIVE
KETONES UR-MCNC: NEGATIVE
LEUKOCYTE ESTERASE UR QL STRIP: NEGATIVE
NITRITE UR QL STRIP: NEGATIVE
PH UR STRIP: 6.5
PROT UR STRIP-MCNC: NEGATIVE
QUALITY CONTROL: YES
SP GR UR STRIP: 1.01

## 2019-05-22 PROCEDURE — 36415 COLL VENOUS BLD VENIPUNCTURE: CPT

## 2019-05-22 PROCEDURE — 99214 OFFICE O/P EST MOD 30 MIN: CPT | Mod: 25

## 2019-05-22 PROCEDURE — 81025 URINE PREGNANCY TEST: CPT

## 2019-05-22 RX ORDER — NORETHINDRONE 0.35 MG/1
0.35 TABLET ORAL DAILY
Qty: 28 | Refills: 6 | Status: DISCONTINUED | COMMUNITY
Start: 2019-03-05 | End: 2019-05-22

## 2019-05-22 NOTE — HISTORY OF PRESENT ILLNESS
[FreeTextEntry8] : Ms. Hatfield presents for not feeling well.  Feels tired.  Denies fever/chills.\par -Finished course of antibiotics for UTI at Urgent Care, unsure of culture results at beginning of May.  Urinary symptoms improved but then returned again last week.  Notes urinary frequency. \par \par Started OCP for 1 week back in March and then stopped it.  LMP March 18-21st; has not had one since.  Reports she is sexually active, agreeable to U pregnancy testing today. Stopped breastfeeding early May.  Went back to work full time from maternity leave. Also feels hair has been thinning. \par \par Also notes seasonal allergies have been bothering her and eczema has been flaring on her. Has needed her pump a few times. \par Also reports lower back pain. \par Medications and allergies reviewed.\par \par \par \par

## 2019-05-22 NOTE — REVIEW OF SYSTEMS
[Fatigue] : fatigue [Back Pain] : back pain [Negative] : Gastrointestinal [Fever] : no fever [Chills] : no chills [Cough] : no cough [FreeTextEntry6] : some episodes of wheezing [FreeTextEntry8] : Urgency, pressure [de-identified] : Getting up two times per night

## 2019-05-22 NOTE — PHYSICAL EXAM
[No Acute Distress] : no acute distress [Well Nourished] : well nourished [Well Developed] : well developed [Well-Appearing] : well-appearing [Normal Sclera/Conjunctiva] : normal sclera/conjunctiva [Normal Oropharynx] : the oropharynx was normal [Normal TMs] : both tympanic membranes were normal [Supple] : supple [No Lymphadenopathy] : no lymphadenopathy [Thyroid Normal, No Nodules] : the thyroid was normal and there were no nodules present [No Respiratory Distress] : no respiratory distress  [Clear to Auscultation] : lungs were clear to auscultation bilaterally [No Accessory Muscle Use] : no accessory muscle use [Normal Rate] : normal rate  [Regular Rhythm] : with a regular rhythm [Normal S1, S2] : normal S1 and S2 [No Edema] : there was no peripheral edema [No Extremity Clubbing/Cyanosis] : no extremity clubbing/cyanosis [Soft] : abdomen soft [Non Tender] : non-tender [Non-distended] : non-distended [No Masses] : no abdominal mass palpated [Normal Bowel Sounds] : normal bowel sounds [No CVA Tenderness] : no CVA  tenderness [Normal Affect] : the affect was normal [Alert and Oriented x3] : oriented to person, place, and time [Normal Mood] : the mood was normal [de-identified] : Paraspinal tenderness

## 2019-05-22 NOTE — PLAN
[FreeTextEntry1] : POCT UA and U pregnancy negative.  Will send urine culture. Afebrile in office. \par Will follow-up bloodwork and evaluate for causes of fatigue. Check thyroid. \par \par Will refill topical steroid for eczema flares.  \par Advised to try trial of heat/warm compresses for muscle tenderness as needed.  Advised to cover compress, not place directly on skin and not to apply for more than 15 minutes at a time.  Patient expressed understanding.\par \par Patient will also make follow-up appointment with Pulmonology, possible sleep study evaluation.

## 2019-05-23 DIAGNOSIS — R74.8 ABNORMAL LEVELS OF OTHER SERUM ENZYMES: ICD-10-CM

## 2019-05-23 LAB
ALBUMIN SERPL ELPH-MCNC: 4.6 G/DL
ALP BLD-CCNC: 133 U/L
ALT SERPL-CCNC: 37 U/L
ANION GAP SERPL CALC-SCNC: 15 MMOL/L
AST SERPL-CCNC: 21 U/L
BASOPHILS # BLD AUTO: 0.05 K/UL
BASOPHILS NFR BLD AUTO: 0.6 %
BILIRUB SERPL-MCNC: 0.2 MG/DL
BUN SERPL-MCNC: 10 MG/DL
CALCIUM SERPL-MCNC: 10 MG/DL
CHLORIDE SERPL-SCNC: 102 MMOL/L
CO2 SERPL-SCNC: 24 MMOL/L
CREAT SERPL-MCNC: 0.95 MG/DL
EOSINOPHIL # BLD AUTO: 0.2 K/UL
EOSINOPHIL NFR BLD AUTO: 2.3 %
GLUCOSE SERPL-MCNC: 86 MG/DL
HCT VFR BLD CALC: 38.3 %
HGB BLD-MCNC: 12.3 G/DL
IMM GRANULOCYTES NFR BLD AUTO: 0.3 %
LYMPHOCYTES # BLD AUTO: 2.59 K/UL
LYMPHOCYTES NFR BLD AUTO: 29.2 %
MAN DIFF?: NORMAL
MCHC RBC-ENTMCNC: 28 PG
MCHC RBC-ENTMCNC: 32.1 GM/DL
MCV RBC AUTO: 87 FL
MONOCYTES # BLD AUTO: 0.58 K/UL
MONOCYTES NFR BLD AUTO: 6.5 %
NEUTROPHILS # BLD AUTO: 5.41 K/UL
NEUTROPHILS NFR BLD AUTO: 61.1 %
PLATELET # BLD AUTO: 291 K/UL
POTASSIUM SERPL-SCNC: 4.1 MMOL/L
PROT SERPL-MCNC: 7.3 G/DL
RBC # BLD: 4.4 M/UL
RBC # FLD: 14 %
SODIUM SERPL-SCNC: 141 MMOL/L
TSH SERPL-ACNC: 0.69 UIU/ML
WBC # FLD AUTO: 8.86 K/UL

## 2019-05-24 LAB — BACTERIA UR CULT: NORMAL

## 2019-06-22 ENCOUNTER — TRANSCRIPTION ENCOUNTER (OUTPATIENT)
Age: 29
End: 2019-06-22

## 2019-07-17 ENCOUNTER — APPOINTMENT (OUTPATIENT)
Dept: OBGYN | Facility: CLINIC | Age: 29
End: 2019-07-17
Payer: COMMERCIAL

## 2019-07-17 VITALS
BODY MASS INDEX: 42.35 KG/M2 | SYSTOLIC BLOOD PRESSURE: 131 MMHG | DIASTOLIC BLOOD PRESSURE: 92 MMHG | HEIGHT: 63 IN | WEIGHT: 239 LBS

## 2019-07-17 VITALS — SYSTOLIC BLOOD PRESSURE: 131 MMHG | DIASTOLIC BLOOD PRESSURE: 87 MMHG

## 2019-07-17 DIAGNOSIS — F41.9 ANXIETY DISORDER, UNSPECIFIED: ICD-10-CM

## 2019-07-17 DIAGNOSIS — F32.9 ANXIETY DISORDER, UNSPECIFIED: ICD-10-CM

## 2019-07-17 LAB
BILIRUB UR QL STRIP: NORMAL
GLUCOSE UR-MCNC: NORMAL
HCG UR QL: 0.2 EU/DL
HCG UR QL: NEGATIVE
HGB UR QL STRIP.AUTO: NORMAL
KETONES UR-MCNC: NORMAL
LEUKOCYTE ESTERASE UR QL STRIP: NORMAL
NITRITE UR QL STRIP: NORMAL
PH UR STRIP: 5.5
PROT UR STRIP-MCNC: NORMAL
SP GR UR STRIP: 1.02

## 2019-07-17 PROCEDURE — 81025 URINE PREGNANCY TEST: CPT

## 2019-07-17 PROCEDURE — 99395 PREV VISIT EST AGE 18-39: CPT

## 2019-07-17 PROCEDURE — 81002 URINALYSIS NONAUTO W/O SCOPE: CPT

## 2019-07-18 LAB
BASOPHILS # BLD AUTO: 0.05 K/UL
BASOPHILS NFR BLD AUTO: 0.6 %
C TRACH RRNA SPEC QL NAA+PROBE: NOT DETECTED
EOSINOPHIL # BLD AUTO: 0.21 K/UL
EOSINOPHIL NFR BLD AUTO: 2.6 %
HCT VFR BLD CALC: 40.3 %
HGB BLD-MCNC: 12.5 G/DL
IMM GRANULOCYTES NFR BLD AUTO: 0.4 %
LYMPHOCYTES # BLD AUTO: 2.84 K/UL
LYMPHOCYTES NFR BLD AUTO: 34.5 %
MAN DIFF?: NORMAL
MCHC RBC-ENTMCNC: 27.8 PG
MCHC RBC-ENTMCNC: 31 GM/DL
MCV RBC AUTO: 89.6 FL
MONOCYTES # BLD AUTO: 0.55 K/UL
MONOCYTES NFR BLD AUTO: 6.7 %
N GONORRHOEA RRNA SPEC QL NAA+PROBE: NOT DETECTED
NEUTROPHILS # BLD AUTO: 4.54 K/UL
NEUTROPHILS NFR BLD AUTO: 55.2 %
PLATELET # BLD AUTO: 279 K/UL
RBC # BLD: 4.5 M/UL
RBC # FLD: 13.9 %
SOURCE AMPLIFICATION: NORMAL
TSH SERPL-ACNC: 0.96 UIU/ML
WBC # FLD AUTO: 8.22 K/UL

## 2019-07-19 LAB — BACTERIA UR CULT: NORMAL

## 2019-07-22 ENCOUNTER — APPOINTMENT (OUTPATIENT)
Dept: MRI IMAGING | Facility: CLINIC | Age: 29
End: 2019-07-22

## 2019-08-12 ENCOUNTER — CHART COPY (OUTPATIENT)
Age: 29
End: 2019-08-12

## 2019-08-17 ENCOUNTER — TRANSCRIPTION ENCOUNTER (OUTPATIENT)
Age: 29
End: 2019-08-17

## 2019-10-24 ENCOUNTER — TRANSCRIPTION ENCOUNTER (OUTPATIENT)
Age: 29
End: 2019-10-24

## 2019-12-11 ENCOUNTER — TRANSCRIPTION ENCOUNTER (OUTPATIENT)
Age: 29
End: 2019-12-11

## 2019-12-13 ENCOUNTER — TRANSCRIPTION ENCOUNTER (OUTPATIENT)
Age: 29
End: 2019-12-13

## 2020-01-03 ENCOUNTER — EMERGENCY (EMERGENCY)
Facility: HOSPITAL | Age: 30
LOS: 1 days | Discharge: LEFT BEFORE DISCHARGE | End: 2020-01-03
Admitting: EMERGENCY MEDICINE
Payer: COMMERCIAL

## 2020-01-03 VITALS
SYSTOLIC BLOOD PRESSURE: 115 MMHG | DIASTOLIC BLOOD PRESSURE: 67 MMHG | TEMPERATURE: 98 F | RESPIRATION RATE: 15 BRPM | OXYGEN SATURATION: 100 % | HEART RATE: 62 BPM

## 2020-01-03 PROCEDURE — 99053 MED SERV 10PM-8AM 24 HR FAC: CPT

## 2020-01-03 NOTE — ED ADULT TRIAGE NOTE - CHIEF COMPLAINT QUOTE
pt sent from urgent care for rlq pain x 2 days. endorses chills and nausea. no vomiting, urinary symptoms, vag bleed or discharge

## 2020-04-03 ENCOUNTER — APPOINTMENT (OUTPATIENT)
Dept: FAMILY MEDICINE | Facility: CLINIC | Age: 30
End: 2020-04-03
Payer: COMMERCIAL

## 2020-04-03 PROCEDURE — 99214 OFFICE O/P EST MOD 30 MIN: CPT | Mod: 95

## 2020-04-03 RX ORDER — SERTRALINE 25 MG/1
25 TABLET, FILM COATED ORAL DAILY
Qty: 30 | Refills: 0 | Status: DISCONTINUED | COMMUNITY
Start: 2019-07-17 | End: 2020-04-03

## 2020-04-03 NOTE — REVIEW OF SYSTEMS
[Cough] : cough [Back Pain] : back pain [Negative] : Constitutional [Nausea] : no nausea [Vomiting] : no vomiting [Headache] : no headache [Dizziness] : no dizziness [FreeTextEntry7] : diarrhea 3 days ago

## 2020-04-03 NOTE — HISTORY OF PRESENT ILLNESS
[Home] : at home, [unfilled] , at the time of the visit. [Medical Office: ([facility])___] : at ~his/her~ medical office located in [place] [Patient] : the patient [FreeTextEntry1] : Coronavirus follow up.  [de-identified] : Went to  on 03/18 tested for coronavirus, given neb treatment; went back on 03/22 for symptoms. Was using nebulizer almost every hour-did not go to ER. \par Has been taking tylenol\par Had old percocet at home-last took 2 days ago for headache and body ache. \par Has been using nebulizer every 8 hours, which is improved.\par Coughing but no fever for 3 days. Some sore throat and congestion still. \par No N/V/D for 3 days.  Had loose stools 3 days ago. \par \par Eyes have been crusting in AM and irritated-used son's eye drops. \par Also reports yeast discharge.  Had been on antibiotics prior to coming down with corona virus for sinusitis. \par Has been using Polymyxin eye drops. \par \par

## 2020-04-03 NOTE — PHYSICAL EXAM
[Normal Sclera/Conjunctiva] : normal sclera/conjunctiva [No Respiratory Distress] : no respiratory distress  [Normal] : affect was normal and insight and judgment were intact

## 2020-04-03 NOTE — PLAN
[FreeTextEntry1] : Spoke with patient. Advised to monitor symptoms. Improving overall.\par Recommending mucinex for cough.  Saline nasal spray for sinus congestion. \par \par Advised to rest, drink plenty of fluids. Warm liquids, salt water gargles for throat.\par Will send eye drops. \par \par Diflucan for yeast-has taken in past without problems-advised recurrent yeast infection will require gyn follow-up. \par Advised to try trial of heat/warm compresses for muscle tenderness as needed.  Advised to cover compress, not place directly on skin and not to apply for more than 15 minutes at a time.  Patient expressed understanding.\par \par Discussed CDC recommendations.  Self-care, home isolation for at least 7 days since symptoms first appeared and 72 hours since fever resolved without fever reducing medications. \par \par Patient expressed understanding.\par

## 2020-09-28 ENCOUNTER — APPOINTMENT (OUTPATIENT)
Dept: FAMILY MEDICINE | Facility: CLINIC | Age: 30
End: 2020-09-28
Payer: COMMERCIAL

## 2020-09-28 DIAGNOSIS — Z86.69 PERSONAL HISTORY OF OTHER DISEASES OF THE NERVOUS SYSTEM AND SENSE ORGANS: ICD-10-CM

## 2020-09-28 PROCEDURE — 99213 OFFICE O/P EST LOW 20 MIN: CPT | Mod: 95

## 2020-09-28 NOTE — HISTORY OF PRESENT ILLNESS
[Home] : at home, [unfilled] , at the time of the visit. [Medical Office: ([facility])___] : at the medical office located in  [Verbal consent obtained from patient] : the patient, [unfilled] [FreeTextEntry8] : c/o worsening fatigue, snoring, breathing pauses per boyfriend pt states, occasional headache, wgt gain\par hx of covid, went to urgent care, covid swab neg\par last physical 2019\par worried about sleep apnea\par also c/o bad allergies, taking zyrtec and nasonex

## 2020-09-28 NOTE — PHYSICAL EXAM
[No Acute Distress] : no acute distress [Well Nourished] : well nourished [Normal Sclera/Conjunctiva] : normal sclera/conjunctiva [Normal Outer Ear/Nose] : the outer ears and nose were normal in appearance [No Respiratory Distress] : no respiratory distress  [No Accessory Muscle Use] : no accessory muscle use [Coordination Grossly Intact] : coordination grossly intact [Normal Affect] : the affect was normal [Alert and Oriented x3] : oriented to person, place, and time [Normal Insight/Judgement] : insight and judgment were intact [de-identified] : obese

## 2020-10-01 ENCOUNTER — TRANSCRIPTION ENCOUNTER (OUTPATIENT)
Age: 30
End: 2020-10-01

## 2020-10-18 ENCOUNTER — FORM ENCOUNTER (OUTPATIENT)
Age: 30
End: 2020-10-18

## 2020-10-19 ENCOUNTER — APPOINTMENT (OUTPATIENT)
Dept: SLEEP CENTER | Facility: CLINIC | Age: 30
End: 2020-10-19

## 2020-10-19 ENCOUNTER — OUTPATIENT (OUTPATIENT)
Dept: OUTPATIENT SERVICES | Facility: HOSPITAL | Age: 30
LOS: 1 days | End: 2020-10-19
Payer: COMMERCIAL

## 2020-10-19 PROCEDURE — ZZZZZ: CPT

## 2020-10-19 PROCEDURE — 95806 SLEEP STUDY UNATT&RESP EFFT: CPT | Mod: 26

## 2020-10-19 PROCEDURE — 95806 SLEEP STUDY UNATT&RESP EFFT: CPT

## 2020-10-22 DIAGNOSIS — G47.33 OBSTRUCTIVE SLEEP APNEA (ADULT) (PEDIATRIC): ICD-10-CM

## 2020-10-23 ENCOUNTER — TRANSCRIPTION ENCOUNTER (OUTPATIENT)
Age: 30
End: 2020-10-23

## 2020-11-09 ENCOUNTER — APPOINTMENT (OUTPATIENT)
Dept: FAMILY MEDICINE | Facility: CLINIC | Age: 30
End: 2020-11-09
Payer: COMMERCIAL

## 2020-11-09 VITALS
WEIGHT: 240 LBS | DIASTOLIC BLOOD PRESSURE: 80 MMHG | HEART RATE: 80 BPM | OXYGEN SATURATION: 98 % | SYSTOLIC BLOOD PRESSURE: 128 MMHG | BODY MASS INDEX: 42.51 KG/M2

## 2020-11-09 DIAGNOSIS — J30.2 OTHER SEASONAL ALLERGIC RHINITIS: ICD-10-CM

## 2020-11-09 DIAGNOSIS — Z01.419 ENCOUNTER FOR GYNECOLOGICAL EXAMINATION (GENERAL) (ROUTINE) W/OUT ABNORMAL FINDINGS: ICD-10-CM

## 2020-11-09 DIAGNOSIS — Z82.5 FAMILY HISTORY OF ASTHMA AND OTHER CHRONIC LOWER RESPIRATORY DISEASES: ICD-10-CM

## 2020-11-09 DIAGNOSIS — N64.89 OTHER SPECIFIED DISORDERS OF BREAST: ICD-10-CM

## 2020-11-09 DIAGNOSIS — Z82.49 FAMILY HISTORY OF ISCHEMIC HEART DISEASE AND OTHER DISEASES OF THE CIRCULATORY SYSTEM: ICD-10-CM

## 2020-11-09 DIAGNOSIS — R63.8 OTHER SYMPTOMS AND SIGNS CONCERNING FOOD AND FLUID INTAKE: ICD-10-CM

## 2020-11-09 DIAGNOSIS — M54.6 PAIN IN THORACIC SPINE: ICD-10-CM

## 2020-11-09 PROCEDURE — 36415 COLL VENOUS BLD VENIPUNCTURE: CPT

## 2020-11-09 PROCEDURE — 99072 ADDL SUPL MATRL&STAF TM PHE: CPT

## 2020-11-09 PROCEDURE — 99395 PREV VISIT EST AGE 18-39: CPT | Mod: 25

## 2020-11-09 RX ORDER — FLUCONAZOLE 150 MG/1
150 TABLET ORAL
Qty: 1 | Refills: 0 | Status: DISCONTINUED | COMMUNITY
Start: 2020-04-03 | End: 2020-11-09

## 2020-11-09 RX ORDER — POLYMYXIN B SULFATE AND TRIMETHOPRIM 10000; 1 [USP'U]/ML; MG/ML
10000-0.1 SOLUTION OPHTHALMIC 4 TIMES DAILY
Qty: 1 | Refills: 0 | Status: DISCONTINUED | COMMUNITY
Start: 2020-04-03 | End: 2020-11-09

## 2020-11-09 RX ORDER — TRIAMCINOLONE ACETONIDE 1 MG/G
0.1 CREAM TOPICAL 3 TIMES DAILY
Qty: 1 | Refills: 0 | Status: DISCONTINUED | COMMUNITY
Start: 2019-02-07 | End: 2020-11-09

## 2020-11-09 RX ORDER — LEVOCETIRIZINE DIHYDROCHLORIDE 5 MG/1
5 TABLET ORAL DAILY
Qty: 1 | Refills: 2 | Status: ACTIVE | COMMUNITY
Start: 2020-11-09 | End: 1900-01-01

## 2020-11-09 NOTE — HEALTH RISK ASSESSMENT
[Yes] : Yes [Monthly or less (1 pt)] : Monthly or less (1 point) [1 or 2 (0 pts)] : 1 or 2 (0 points) [Never (0 pts)] : Never (0 points) [No] : In the past 12 months have you used drugs other than those required for medical reasons? No [0] : 2) Feeling down, depressed, or hopeless: Not at all (0) [HIV Test offered] : HIV Test offered [Hepatitis C test offered] : Hepatitis C test offered [With Family] : lives with family [# of Members in Household ___] :  household currently consist of [unfilled] member(s) [Employed] : employed [Single] : single [# Of Children ___] : has [unfilled] children [Sexually Active] : sexually active [Feels Safe at Home] : Feels safe at home [Fully functional (bathing, dressing, toileting, transferring, walking, feeding)] : Fully functional (bathing, dressing, toileting, transferring, walking, feeding) [Fully functional (using the telephone, shopping, preparing meals, housekeeping, doing laundry, using] : Fully functional and needs no help or supervision to perform IADLs (using the telephone, shopping, preparing meals, housekeeping, doing laundry, using transportation, managing medications and managing finances) [Seat Belt] :  uses seat belt [Good] : ~his/her~  mood as  good [] : No [de-identified] : no exercise [de-identified] : soda [Change in mental status noted] : No change in mental status noted [Language] : denies difficulty with language [Handling Complex Tasks] : denies difficulty handling complex tasks [High Risk Behavior] : no high risk behavior [Reports changes in hearing] : Reports no changes in hearing [Reports changes in vision] : Reports no changes in vision [Reports changes in dental health] : Reports no changes in dental health [PapSmearDate] : 11/19 [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

## 2020-11-09 NOTE — HISTORY OF PRESENT ILLNESS
[FreeTextEntry1] : 30 year old female who presents today for a complete physical exam.\par c/o asthma symptoms, hx of covid, off symbicort, only on ventolin, and occasional neb txt\par gaining wgt, c/o mid back pain, large breasts, would like surgery, would also like to lose wgt\par likes chinese food, chicharrones, Mongolian food, soda

## 2020-11-09 NOTE — PHYSICAL EXAM
[No Acute Distress] : no acute distress [Well Nourished] : well nourished [Well Developed] : well developed [Well-Appearing] : well-appearing [Normal Sclera/Conjunctiva] : normal sclera/conjunctiva [PERRL] : pupils equal round and reactive to light [EOMI] : extraocular movements intact [Normal Outer Ear/Nose] : the outer ears and nose were normal in appearance [Normal TMs] : both tympanic membranes were normal [No JVD] : no jugular venous distention [No Lymphadenopathy] : no lymphadenopathy [Supple] : supple [Thyroid Normal, No Nodules] : the thyroid was normal and there were no nodules present [No Respiratory Distress] : no respiratory distress  [No Accessory Muscle Use] : no accessory muscle use [Clear to Auscultation] : lungs were clear to auscultation bilaterally [Normal Rate] : normal rate  [Regular Rhythm] : with a regular rhythm [Normal S1, S2] : normal S1 and S2 [No Murmur] : no murmur heard [No Carotid Bruits] : no carotid bruits [No Abdominal Bruit] : a ~M bruit was not heard ~T in the abdomen [No Varicosities] : no varicosities [Pedal Pulses Present] : the pedal pulses are present [No Edema] : there was no peripheral edema [No Palpable Aorta] : no palpable aorta [No Extremity Clubbing/Cyanosis] : no extremity clubbing/cyanosis [Normal Appearance] : normal in appearance [No Nipple Discharge] : no nipple discharge [No Axillary Lymphadenopathy] : no axillary lymphadenopathy [Soft] : abdomen soft [Non Tender] : non-tender [Non-distended] : non-distended [No Masses] : no abdominal mass palpated [No HSM] : no HSM [Normal Bowel Sounds] : normal bowel sounds [Normal Posterior Cervical Nodes] : no posterior cervical lymphadenopathy [Normal Anterior Cervical Nodes] : no anterior cervical lymphadenopathy [No CVA Tenderness] : no CVA  tenderness [No Spinal Tenderness] : no spinal tenderness [No Joint Swelling] : no joint swelling [Grossly Normal Strength/Tone] : grossly normal strength/tone [No Rash] : no rash [Coordination Grossly Intact] : coordination grossly intact [No Focal Deficits] : no focal deficits [Normal Gait] : normal gait [Normal Affect] : the affect was normal [Alert and Oriented x3] : oriented to person, place, and time [Normal Insight/Judgement] : insight and judgment were intact

## 2020-11-09 NOTE — COUNSELING
[Benefits of weight loss discussed] : Benefits of weight loss discussed [Encouraged to increase physical activity] : Encouraged to increase physical activity [Decrease Portions] : decrease portions [Good understanding] : Patient has a good understanding of disease, goals and obesity follow-up plan [FreeTextEntry2] : reduce carbs/fats

## 2020-12-01 ENCOUNTER — TRANSCRIPTION ENCOUNTER (OUTPATIENT)
Age: 30
End: 2020-12-01

## 2020-12-08 ENCOUNTER — APPOINTMENT (OUTPATIENT)
Dept: PULMONOLOGY | Facility: CLINIC | Age: 30
End: 2020-12-08

## 2020-12-15 ENCOUNTER — APPOINTMENT (OUTPATIENT)
Dept: PULMONOLOGY | Facility: CLINIC | Age: 30
End: 2020-12-15
Payer: COMMERCIAL

## 2020-12-15 VITALS
SYSTOLIC BLOOD PRESSURE: 130 MMHG | TEMPERATURE: 98.1 F | BODY MASS INDEX: 42.52 KG/M2 | OXYGEN SATURATION: 99 % | RESPIRATION RATE: 15 BRPM | DIASTOLIC BLOOD PRESSURE: 83 MMHG | HEART RATE: 76 BPM | WEIGHT: 240 LBS | HEIGHT: 63 IN

## 2020-12-15 DIAGNOSIS — R06.83 SNORING: ICD-10-CM

## 2020-12-15 PROCEDURE — 99072 ADDL SUPL MATRL&STAF TM PHE: CPT

## 2020-12-15 PROCEDURE — 99204 OFFICE O/P NEW MOD 45 MIN: CPT

## 2020-12-15 NOTE — CONSULT LETTER
[Dear  ___] : Dear  [unfilled], [Consult Letter:] : I had the pleasure of evaluating your patient, [unfilled]. [Please see my note below.] : Please see my note below. [Consult Closing:] : Thank you very much for allowing me to participate in the care of this patient.  If you have any questions, please do not hesitate to contact me. [Sincerely,] : Sincerely, [FreeTextEntry3] : Krystal Gunter MD

## 2020-12-15 NOTE — REVIEW OF SYSTEMS
[EDS: ESS=____] : daytime somnolence: ESS=[unfilled] [Fatigue] : fatigue [Snoring] : snoring [Witnessed Apneas] : witnessed apnea [A.M. Dry Mouth] : a.m. dry mouth [Obesity] : obesity [A.M. Headache] : headache present upon awakening [Heartburn] : heartburn [Nocturia] : nocturia [Difficulty Maintaining Sleep] : difficulty maintaining sleep [Negative] : Psychiatric [Difficulty Initiating Sleep] : no difficulty falling asleep [Lower Extremity Discomfort] : no lower extremity discomfort [Unusual Sleep Behavior] : no unusual sleep behavior [Hypersomnolence] : not sleeping much more than usual

## 2020-12-15 NOTE — HISTORY OF PRESENT ILLNESS
[FreeTextEntry1] : 31yo F with history of heavy snoring since pregnancy and beyond. She also had witnessed apneas, nocturnal gasping and choking. She was also getting sleepier and sleepier during the day. She also complains of fragmented sleep and feels unrefreshed in the morning and sleepy throughout the day. She also complains of dry mouth in the morning, GERD at night, nocturia. \par \par She also had COVID in March and since then, has felt more fatigued. Also she complains of occasional shortness of breath. No wheezing, sometimes experiences a sharp tightness in her chest. No chest pressure or other pain described.

## 2020-12-15 NOTE — ASSESSMENT
[FreeTextEntry1] : 31yo F with history of heavy snoring since pregnancy and beyond. She also had witnessed apneas, nocturnal gasping and choking. She was also getting sleepier and sleepier during the day. She also complains of fragmented sleep and feels unrefreshed in the morning and sleepy throughout the day. She also complains of dry mouth in the morning, GERD at night, nocturia. \par \par She also had COVID in March and since then, has felt more fatigued. Also she complains of occasional shortness of breath. No wheezing, sometimes experiences a sharp tightness in her chest. No chest pressure or other pain described. \par \par She wants to try CPAP but cannot make it to the lab because of her kids\par Will order new APAP\par RTC 2 months\par I explained the rationale for treatment of EDWARD -- to improve quality of life, daytime function and to decrease the cardiometabolic and other medical risks that are associated with untreated EDWARD. The patient verbalized understanding.\par RTC 2 months

## 2020-12-21 ENCOUNTER — APPOINTMENT (OUTPATIENT)
Dept: PULMONOLOGY | Facility: CLINIC | Age: 30
End: 2020-12-21

## 2020-12-22 ENCOUNTER — APPOINTMENT (OUTPATIENT)
Dept: SLEEP CENTER | Facility: CLINIC | Age: 30
End: 2020-12-22

## 2021-01-04 ENCOUNTER — APPOINTMENT (OUTPATIENT)
Dept: FAMILY MEDICINE | Facility: CLINIC | Age: 31
End: 2021-01-04
Payer: COMMERCIAL

## 2021-01-04 VITALS
HEIGHT: 63 IN | OXYGEN SATURATION: 98 % | WEIGHT: 239 LBS | DIASTOLIC BLOOD PRESSURE: 80 MMHG | HEART RATE: 109 BPM | BODY MASS INDEX: 42.35 KG/M2 | SYSTOLIC BLOOD PRESSURE: 130 MMHG

## 2021-01-04 DIAGNOSIS — L65.9 NONSCARRING HAIR LOSS, UNSPECIFIED: ICD-10-CM

## 2021-01-04 DIAGNOSIS — E66.9 OBESITY, UNSPECIFIED: ICD-10-CM

## 2021-01-04 DIAGNOSIS — Z71.3 DIETARY COUNSELING AND SURVEILLANCE: ICD-10-CM

## 2021-01-04 PROCEDURE — 36415 COLL VENOUS BLD VENIPUNCTURE: CPT

## 2021-01-04 PROCEDURE — 99214 OFFICE O/P EST MOD 30 MIN: CPT | Mod: 25

## 2021-01-04 PROCEDURE — 99072 ADDL SUPL MATRL&STAF TM PHE: CPT

## 2021-01-04 RX ORDER — NITROFURANTOIN (MONOHYDRATE/MACROCRYSTALS) 25; 75 MG/1; MG/1
100 CAPSULE ORAL
Qty: 14 | Refills: 0 | Status: COMPLETED | COMMUNITY
Start: 2020-09-04

## 2021-01-04 RX ORDER — CEFPODOXIME PROXETIL 200 MG/1
200 TABLET, FILM COATED ORAL
Qty: 14 | Refills: 0 | Status: COMPLETED | COMMUNITY
Start: 2020-12-15

## 2021-01-04 RX ORDER — METRONIDAZOLE 7.5 MG/G
0.75 GEL VAGINAL
Qty: 70 | Refills: 0 | Status: COMPLETED | COMMUNITY
Start: 2020-09-08

## 2021-01-04 NOTE — PLAN
[FreeTextEntry1] : reduce carbs/fats/fried foods (examples given), exercise\par f/u next mth\par f/u w/ sleep specialist\par chk bw\par Advised covid vx

## 2021-01-04 NOTE — PHYSICAL EXAM
[No Acute Distress] : no acute distress [Normal Sclera/Conjunctiva] : normal sclera/conjunctiva [EOMI] : extraocular movements intact [Normal Outer Ear/Nose] : the outer ears and nose were normal in appearance [No JVD] : no jugular venous distention [No Respiratory Distress] : no respiratory distress  [No Accessory Muscle Use] : no accessory muscle use [Coordination Grossly Intact] : coordination grossly intact [Normal Affect] : the affect was normal [Alert and Oriented x3] : oriented to person, place, and time [Normal Insight/Judgement] : insight and judgment were intact [de-identified] : obese

## 2021-01-04 NOTE — HISTORY OF PRESENT ILLNESS
[FreeTextEntry1] : here for fasting bw\par wants to discuss wgt loss, knows she could do better w/ diet, but tough during the holidays, not exercising\par has to f/u for further sleep studies/testing\par would like to start med to lose wgt\par cut down drinking 2 bottles of soda to 1 cup a day\par apprehensive about covid vx due to watching nurse faint after vx on tv

## 2021-01-07 ENCOUNTER — TRANSCRIPTION ENCOUNTER (OUTPATIENT)
Age: 31
End: 2021-01-07

## 2021-01-11 ENCOUNTER — TRANSCRIPTION ENCOUNTER (OUTPATIENT)
Age: 31
End: 2021-01-11

## 2021-01-25 ENCOUNTER — TRANSCRIPTION ENCOUNTER (OUTPATIENT)
Age: 31
End: 2021-01-25

## 2021-02-17 ENCOUNTER — APPOINTMENT (OUTPATIENT)
Dept: INTERNAL MEDICINE | Facility: CLINIC | Age: 31
End: 2021-02-17

## 2021-04-13 NOTE — DISCHARGE NOTE OB - BREAST MILK SUPPORTS STABLE NEWBORN BLOOD SUGAR
1.  Please instruct patient that he should only be using triamcinolone twice daily to any specific area of his body.  Over usage can thin out the skin excessively.  I would use Vaseline or another thick moisturizer such as Cetaphil or CeraVe if he needs other applications during the daytime to his hands or feet.  2.  I can see the patient is scheduled to see Dr. Rodriguez tomorrow in clinic.  If he needs further dermatology follow-up after that, please have patient or Dr. Rodriguez to reach out to me to get him scheduled.   Statement Selected

## 2021-12-03 ENCOUNTER — TRANSCRIPTION ENCOUNTER (OUTPATIENT)
Age: 31
End: 2021-12-03

## 2021-12-21 ENCOUNTER — APPOINTMENT (OUTPATIENT)
Dept: OBGYN | Facility: CLINIC | Age: 31
End: 2021-12-21
Payer: COMMERCIAL

## 2021-12-21 VITALS
SYSTOLIC BLOOD PRESSURE: 122 MMHG | BODY MASS INDEX: 42.52 KG/M2 | HEIGHT: 63 IN | WEIGHT: 240 LBS | DIASTOLIC BLOOD PRESSURE: 86 MMHG

## 2021-12-21 PROCEDURE — 99212 OFFICE O/P EST SF 10 MIN: CPT

## 2021-12-21 NOTE — HISTORY OF PRESENT ILLNESS
[FreeTextEntry1] : pt is a 32 y/o p1   lmp 12/4new pt gyn visit  presents for new pt gyn visit with complaint of vaginal irritation and odor.

## 2021-12-24 LAB
C TRACH RRNA SPEC QL NAA+PROBE: NOT DETECTED
CANDIDA VAG CYTO: NOT DETECTED
G VAGINALIS+PREV SP MTYP VAG QL MICRO: NOT DETECTED
N GONORRHOEA RRNA SPEC QL NAA+PROBE: NOT DETECTED
SOURCE AMPLIFICATION: NORMAL
T VAGINALIS VAG QL WET PREP: NOT DETECTED

## 2022-01-21 ENCOUNTER — APPOINTMENT (OUTPATIENT)
Dept: FAMILY MEDICINE | Facility: CLINIC | Age: 32
End: 2022-01-21
Payer: COMMERCIAL

## 2022-01-21 VITALS
DIASTOLIC BLOOD PRESSURE: 84 MMHG | HEIGHT: 63 IN | OXYGEN SATURATION: 98 % | TEMPERATURE: 98.5 F | SYSTOLIC BLOOD PRESSURE: 126 MMHG | HEART RATE: 98 BPM | BODY MASS INDEX: 43.41 KG/M2 | WEIGHT: 245 LBS

## 2022-01-21 DIAGNOSIS — Z11.3 ENCOUNTER FOR SCREENING FOR INFECTIONS WITH A PREDOMINANTLY SEXUAL MODE OF TRANSMISSION: ICD-10-CM

## 2022-01-21 DIAGNOSIS — K59.00 CONSTIPATION, UNSPECIFIED: ICD-10-CM

## 2022-01-21 DIAGNOSIS — Z83.2 FAMILY HISTORY OF DISEASES OF THE BLOOD AND BLOOD-FORMING ORGANS AND CERTAIN DISORDERS INVOLVING THE IMMUNE MECHANISM: ICD-10-CM

## 2022-01-21 PROCEDURE — 36415 COLL VENOUS BLD VENIPUNCTURE: CPT

## 2022-01-21 PROCEDURE — 99395 PREV VISIT EST AGE 18-39: CPT | Mod: 25

## 2022-01-21 RX ORDER — ALBUTEROL SULFATE 90 UG/1
108 (90 BASE) AEROSOL, METERED RESPIRATORY (INHALATION)
Qty: 1 | Refills: 0 | Status: DISCONTINUED | COMMUNITY
End: 2022-01-21

## 2022-01-21 RX ORDER — EPINEPHRINE 0.3 MG/.3ML
0.3 INJECTION INTRAMUSCULAR
Qty: 1 | Refills: 0 | Status: ACTIVE | COMMUNITY
Start: 2019-03-05 | End: 1900-01-01

## 2022-01-21 RX ORDER — FLUTICASONE PROPIONATE 50 UG/1
50 SPRAY, METERED NASAL TWICE DAILY
Qty: 1 | Refills: 0 | Status: ACTIVE | COMMUNITY
Start: 2022-01-21 | End: 1900-01-01

## 2022-01-21 RX ORDER — MONTELUKAST 10 MG/1
10 TABLET, FILM COATED ORAL
Qty: 1 | Refills: 1 | Status: DISCONTINUED | COMMUNITY
Start: 2020-11-09 | End: 2022-01-21

## 2022-01-21 RX ORDER — ALBUTEROL SULFATE 90 UG/1
AEROSOL, METERED RESPIRATORY (INHALATION)
Refills: 0 | Status: DISCONTINUED | COMMUNITY
End: 2022-01-21

## 2022-01-21 NOTE — PLAN
[FreeTextEntry1] : Will follow up labwork drawn in office today.  FMLA form filled out.\par HM-up to date Gyn. \par Asthma-restart Symbicort; short trial of Flonase for post-nasal drip. Needs to see Pulmonology for follow-up and repeat PFTs. \par \par GERD-trial of OTC Prilosec;  avoid food triggers.  GI eval.\par Constipation-bowel regimen discussed.\par \par Discussed with MsCayden GUTIERREZ precautions and advised to go to seek immediate medical re-evaluation if condition worsened.  Ms. VALERIE WHITLEY expressed understanding of the plan.\par \par \par

## 2022-01-21 NOTE — PHYSICAL EXAM
[Normal Oropharynx] : the oropharynx was normal [Normal TMs] : both tympanic membranes were normal [No Lymphadenopathy] : no lymphadenopathy [No Edema] : there was no peripheral edema [No Extremity Clubbing/Cyanosis] : no extremity clubbing/cyanosis [Normal] : affect was normal and insight and judgment were intact [de-identified] : No w/r/r; positive cough

## 2022-01-21 NOTE — HEALTH RISK ASSESSMENT
[Never] : Never [Yes] : Yes [# of Members in Household ___] :  household currently consist of [unfilled] member(s) [Employed] : employed [] :  [# Of Children ___] : has [unfilled] children [de-identified] : Rarely  [de-identified] : Not formally [FreeTextEntry2] : [employer]   [FreeTextEntry3] : 3 year old boy [de-identified] : Eyeglasses-2020 [de-identified] : Dentist 2021-March

## 2022-01-21 NOTE — REVIEW OF SYSTEMS
[Cough] : cough [Constipation] : constipation [Heartburn] : heartburn [Negative] : Genitourinary [Fever] : no fever [Chills] : no chills [Earache] : no earache [Sore Throat] : no sore throat [Chest Pain] : no chest pain [Palpitations] : no palpitations [Wheezing] : no wheezing [Nausea] : no nausea [Vomiting] : no vomiting [Headache] : no headache [Dizziness] : no dizziness [FreeTextEntry7] : p [de-identified] : 8-9 hours a night.

## 2022-01-21 NOTE — HISTORY OF PRESENT ILLNESS
[FreeTextEntry1] : Ms. WHITLEY presents for annual physical.\par  [de-identified] : Ms. WHITLEY presents for annual physical.\par Went to Urgent Care for viral infection. \par Last week needed pump BID. \par This week only a few times has needed inhaler. Has been off of Symbicort. \par \par Seen at .  Z-pack given. But not taking.  COVID negative; cough. \par Taking Pseudofed;  Also has been having pain when taking deep breath which is relieved by her rescue inhaler. \par  \par HM-Pap Smear Summer 2021\par \par Always taking pepcid and tums. For GERD-has not seen GI.\par Getting reflux at night despite waiting 2-3 hours before going to bed.  Avoiding triggers. \par Also reporting episodes of constipation. \par Medications and allergies reviewed.\par

## 2022-01-25 ENCOUNTER — TRANSCRIPTION ENCOUNTER (OUTPATIENT)
Age: 32
End: 2022-01-25

## 2022-02-06 ENCOUNTER — TRANSCRIPTION ENCOUNTER (OUTPATIENT)
Age: 32
End: 2022-02-06

## 2022-03-15 ENCOUNTER — TRANSCRIPTION ENCOUNTER (OUTPATIENT)
Age: 32
End: 2022-03-15

## 2022-03-19 ENCOUNTER — TRANSCRIPTION ENCOUNTER (OUTPATIENT)
Age: 32
End: 2022-03-19

## 2022-03-21 ENCOUNTER — TRANSCRIPTION ENCOUNTER (OUTPATIENT)
Age: 32
End: 2022-03-21

## 2022-04-06 ENCOUNTER — APPOINTMENT (OUTPATIENT)
Dept: INTERNAL MEDICINE | Facility: CLINIC | Age: 32
End: 2022-04-06
Payer: COMMERCIAL

## 2022-04-06 VITALS
RESPIRATION RATE: 15 BRPM | BODY MASS INDEX: 43.41 KG/M2 | HEIGHT: 63 IN | WEIGHT: 245 LBS | DIASTOLIC BLOOD PRESSURE: 98 MMHG | OXYGEN SATURATION: 98 % | HEART RATE: 79 BPM | SYSTOLIC BLOOD PRESSURE: 131 MMHG

## 2022-04-06 VITALS
HEIGHT: 63 IN | OXYGEN SATURATION: 98 % | WEIGHT: 245 LBS | HEART RATE: 79 BPM | DIASTOLIC BLOOD PRESSURE: 98 MMHG | BODY MASS INDEX: 43.41 KG/M2 | SYSTOLIC BLOOD PRESSURE: 131 MMHG | TEMPERATURE: 97.7 F

## 2022-04-06 PROCEDURE — 99204 OFFICE O/P NEW MOD 45 MIN: CPT

## 2022-04-06 PROCEDURE — 99215 OFFICE O/P EST HI 40 MIN: CPT

## 2022-04-06 PROCEDURE — ZZZZZ: CPT

## 2022-04-06 RX ORDER — BUDESONIDE AND FORMOTEROL FUMARATE DIHYDRATE 80; 4.5 UG/1; UG/1
80-4.5 AEROSOL RESPIRATORY (INHALATION)
Qty: 10.2 | Refills: 3 | Status: ACTIVE | COMMUNITY
Start: 2020-11-09 | End: 1900-01-01

## 2022-04-06 NOTE — HISTORY OF PRESENT ILLNESS
[FreeTextEntry1] : has sleep apnea . in middle of night she wakes up choking- food is refluxing into her mouth. \par She also is c/o irregular BM- 25% of time.  Father  of throat cancer.  her throat hurts often and she gets heartburn.  She uses OTC pepcid with a little relief and prilosec helps more. \par She intermittently uses CPAP machine

## 2022-04-06 NOTE — ASSESSMENT
[FreeTextEntry1] : Upper endoscopy risk and benefits fully explained\par  will need airway evaluation\par

## 2022-05-09 ENCOUNTER — EMERGENCY (EMERGENCY)
Facility: HOSPITAL | Age: 32
LOS: 1 days | Discharge: HOME | End: 2022-05-09
Attending: EMERGENCY MEDICINE | Admitting: EMERGENCY MEDICINE
Payer: COMMERCIAL

## 2022-05-09 VITALS
RESPIRATION RATE: 18 BRPM | DIASTOLIC BLOOD PRESSURE: 65 MMHG | OXYGEN SATURATION: 99 % | SYSTOLIC BLOOD PRESSURE: 130 MMHG | TEMPERATURE: 98 F | HEIGHT: 63 IN | HEART RATE: 85 BPM

## 2022-05-09 VITALS
RESPIRATION RATE: 18 BRPM | SYSTOLIC BLOOD PRESSURE: 121 MMHG | TEMPERATURE: 98 F | HEART RATE: 73 BPM | OXYGEN SATURATION: 100 % | DIASTOLIC BLOOD PRESSURE: 83 MMHG

## 2022-05-09 LAB
ALBUMIN SERPL ELPH-MCNC: 4 G/DL — SIGNIFICANT CHANGE UP (ref 3.3–5)
ALP SERPL-CCNC: 91 U/L — SIGNIFICANT CHANGE UP (ref 40–120)
ALT FLD-CCNC: 22 U/L — SIGNIFICANT CHANGE UP (ref 4–33)
ANION GAP SERPL CALC-SCNC: 9 MMOL/L — SIGNIFICANT CHANGE UP (ref 7–14)
AST SERPL-CCNC: 13 U/L — SIGNIFICANT CHANGE UP (ref 4–32)
BASOPHILS # BLD AUTO: 0.01 K/UL — SIGNIFICANT CHANGE UP (ref 0–0.2)
BASOPHILS NFR BLD AUTO: 0.1 % — SIGNIFICANT CHANGE UP (ref 0–2)
BILIRUB SERPL-MCNC: 0.2 MG/DL — SIGNIFICANT CHANGE UP (ref 0.2–1.2)
BUN SERPL-MCNC: 10 MG/DL — SIGNIFICANT CHANGE UP (ref 7–23)
CALCIUM SERPL-MCNC: 9.1 MG/DL — SIGNIFICANT CHANGE UP (ref 8.4–10.5)
CHLORIDE SERPL-SCNC: 107 MMOL/L — SIGNIFICANT CHANGE UP (ref 98–107)
CO2 SERPL-SCNC: 25 MMOL/L — SIGNIFICANT CHANGE UP (ref 22–31)
CREAT SERPL-MCNC: 0.79 MG/DL — SIGNIFICANT CHANGE UP (ref 0.5–1.3)
EGFR: 101 ML/MIN/1.73M2 — SIGNIFICANT CHANGE UP
EOSINOPHIL # BLD AUTO: 0.1 K/UL — SIGNIFICANT CHANGE UP (ref 0–0.5)
EOSINOPHIL NFR BLD AUTO: 1.4 % — SIGNIFICANT CHANGE UP (ref 0–6)
GLUCOSE SERPL-MCNC: 81 MG/DL — SIGNIFICANT CHANGE UP (ref 70–99)
HCG SERPL-ACNC: <5 MIU/ML — SIGNIFICANT CHANGE UP
HCT VFR BLD CALC: 38.8 % — SIGNIFICANT CHANGE UP (ref 34.5–45)
HGB BLD-MCNC: 12.8 G/DL — SIGNIFICANT CHANGE UP (ref 11.5–15.5)
IANC: 4.41 K/UL — SIGNIFICANT CHANGE UP (ref 1.8–7.4)
IMM GRANULOCYTES NFR BLD AUTO: 0.4 % — SIGNIFICANT CHANGE UP (ref 0–1.5)
LIDOCAIN IGE QN: 32 U/L — SIGNIFICANT CHANGE UP (ref 7–60)
LYMPHOCYTES # BLD AUTO: 2.12 K/UL — SIGNIFICANT CHANGE UP (ref 1–3.3)
LYMPHOCYTES # BLD AUTO: 29.6 % — SIGNIFICANT CHANGE UP (ref 13–44)
MCHC RBC-ENTMCNC: 29.6 PG — SIGNIFICANT CHANGE UP (ref 27–34)
MCHC RBC-ENTMCNC: 33 GM/DL — SIGNIFICANT CHANGE UP (ref 32–36)
MCV RBC AUTO: 89.6 FL — SIGNIFICANT CHANGE UP (ref 80–100)
MONOCYTES # BLD AUTO: 0.5 K/UL — SIGNIFICANT CHANGE UP (ref 0–0.9)
MONOCYTES NFR BLD AUTO: 7 % — SIGNIFICANT CHANGE UP (ref 2–14)
NEUTROPHILS # BLD AUTO: 4.41 K/UL — SIGNIFICANT CHANGE UP (ref 1.8–7.4)
NEUTROPHILS NFR BLD AUTO: 61.5 % — SIGNIFICANT CHANGE UP (ref 43–77)
NRBC # BLD: 0 /100 WBCS — SIGNIFICANT CHANGE UP
NRBC # FLD: 0 K/UL — SIGNIFICANT CHANGE UP
PLATELET # BLD AUTO: 219 K/UL — SIGNIFICANT CHANGE UP (ref 150–400)
POTASSIUM SERPL-MCNC: 3.9 MMOL/L — SIGNIFICANT CHANGE UP (ref 3.5–5.3)
POTASSIUM SERPL-SCNC: 3.9 MMOL/L — SIGNIFICANT CHANGE UP (ref 3.5–5.3)
PROT SERPL-MCNC: 6.8 G/DL — SIGNIFICANT CHANGE UP (ref 6–8.3)
RBC # BLD: 4.33 M/UL — SIGNIFICANT CHANGE UP (ref 3.8–5.2)
RBC # FLD: 12.1 % — SIGNIFICANT CHANGE UP (ref 10.3–14.5)
SARS-COV-2 RNA SPEC QL NAA+PROBE: SIGNIFICANT CHANGE UP
SODIUM SERPL-SCNC: 141 MMOL/L — SIGNIFICANT CHANGE UP (ref 135–145)
WBC # BLD: 7.17 K/UL — SIGNIFICANT CHANGE UP (ref 3.8–10.5)
WBC # FLD AUTO: 7.17 K/UL — SIGNIFICANT CHANGE UP (ref 3.8–10.5)

## 2022-05-09 PROCEDURE — 99285 EMERGENCY DEPT VISIT HI MDM: CPT

## 2022-05-09 PROCEDURE — 74177 CT ABD & PELVIS W/CONTRAST: CPT | Mod: 26,MA

## 2022-05-09 RX ORDER — ACETAMINOPHEN 500 MG
975 TABLET ORAL ONCE
Refills: 0 | Status: COMPLETED | OUTPATIENT
Start: 2022-05-09 | End: 2022-05-09

## 2022-05-09 RX ORDER — FAMOTIDINE 10 MG/ML
20 INJECTION INTRAVENOUS ONCE
Refills: 0 | Status: COMPLETED | OUTPATIENT
Start: 2022-05-09 | End: 2022-05-09

## 2022-05-09 RX ORDER — ONDANSETRON 8 MG/1
4 TABLET, FILM COATED ORAL ONCE
Refills: 0 | Status: COMPLETED | OUTPATIENT
Start: 2022-05-09 | End: 2022-05-09

## 2022-05-09 RX ORDER — SODIUM CHLORIDE 9 MG/ML
1000 INJECTION INTRAMUSCULAR; INTRAVENOUS; SUBCUTANEOUS ONCE
Refills: 0 | Status: COMPLETED | OUTPATIENT
Start: 2022-05-09 | End: 2022-05-09

## 2022-05-09 RX ADMIN — ONDANSETRON 4 MILLIGRAM(S): 8 TABLET, FILM COATED ORAL at 11:50

## 2022-05-09 RX ADMIN — Medication 975 MILLIGRAM(S): at 11:49

## 2022-05-09 RX ADMIN — Medication 30 MILLILITER(S): at 11:50

## 2022-05-09 RX ADMIN — FAMOTIDINE 20 MILLIGRAM(S): 10 INJECTION INTRAVENOUS at 11:50

## 2022-05-09 RX ADMIN — SODIUM CHLORIDE 1000 MILLILITER(S): 9 INJECTION INTRAMUSCULAR; INTRAVENOUS; SUBCUTANEOUS at 11:50

## 2022-05-09 NOTE — ED PROVIDER NOTE - NSFOLLOWUPINSTRUCTIONS_ED_ALL_ED_FT
You were seen in the emergency department for abdominal pain. You were given fluids, tylenol, maalox, pepcid and zofran. Your CT scan of abdomen and pelvis is normal.     For pain, you may take Tylenol (acetaminophen). Please follow the instructions on the label/container.     Please follow up with your primary care doctor within 48 hours for continuation of care.   Please follow up with your GI doctor for further management of abdominal pain.     Return to the emergency department if you experience any new/concerning/worsening symptoms such as but not limited to: fever (>100.3F), intractable nausea, vomiting, chest pain, shortness of breath, bloody stools.

## 2022-05-09 NOTE — ED PROVIDER NOTE - CLINICAL SUMMARY MEDICAL DECISION MAKING FREE TEXT BOX
Patient is a 32 year-old-female with history of asthma presents with 1-week history of epigastric pain. DDx PUD vs. pancreatitis vs. gastroenteritis. Low suspicion for cholecystitis given lack of CBC, CMP, lipase, VBG and HCG. Fluids, tylenol, maalox, pepcid and zofran. Patient is a 32 year-old-female with history of asthma presents with 1-week history of epigastric pain. DDx PUD vs. pancreatitis vs. gastroenteritis. Low suspicion for cholecystitis given lack of CBC, CMP, lipase and HCG. Fluids, tylenol, maalox, pepcid and zofran.

## 2022-05-09 NOTE — ED PROVIDER NOTE - PATIENT PORTAL LINK FT
You can access the FollowMyHealth Patient Portal offered by [facility] by registering at the following website: [email or web address]. By joining [employer]’s FollowMyHealth portal, you will also be able to view your health information using other applications (apps) compatible with our system.

## 2022-05-09 NOTE — ED PROVIDER NOTE - OBJECTIVE STATEMENT
Patient is a 32 year-old-female with history of asthma presents with 1-week history of epigastric pain. nausea/vomiting and diarrhea. Started last Monday while at work, pain located in epigastric region, dull pain, currently 7/10, worsen with eating, non-exertional, non-pleuritic, +nausea, + vomiting, +nonbloody diarrhea. Denies fever, chills, dysuria, hematuria, increased urinary frequency. LMP 4/22/22. Patient is a 32 year-old-female with history of asthma presents with 1-week history of epigastric pain. nausea/vomiting and diarrhea. Started last Monday while at work, pain located in epigastric region, dull pain, currently /10, worsen with eating, non-exertional, non-pleuritic, +nausea, + vomiting, +nonbloody diarrhea. Denies fever, chills, dysuria, hematuria, increased urinary frequency. LMP 22.    Attendinyo female presents with epigastric pain, nausea, vomiting and diarrhea.  no fever or chills.  has decreased appetite today.

## 2022-05-09 NOTE — ED PROVIDER NOTE - PHYSICAL EXAMINATION
General: non-toxic appearing, in no respiratory distress  HEENT: atraumatic, normocephalic; pupils are equal, round and react to light, extraocular movements intact bilaterally without deficits, no conjunctival pallor, mucous membranes moist  Neck: no jugular venous distension, full range of motion  Chest/Lung: clear to auscultation bilaterally, no wheezes/rhonchi/rales  Heart: regular rate and rhythm, no murmur/gallops/rubs  Abdomen: normal bowel sounds, soft, non-distended, TTP in epigastric, negative robison's  Extremities: no lower extremity edema, +2 radial pulses bilaterally, +2 dorsalis pedis pulses bilaterally  Musculoskeletal: full range of motion of all 4 extremities  Nervous System: alert and oriented, no motor deficits or sensory deficits; CNII-XII grossly intact; no focal neurologic deficits  Skin: no rashes/lacerations noted

## 2022-05-09 NOTE — ED PROVIDER NOTE - PROGRESS NOTE DETAILS
Nielsen PGY1   Patient reassessed and reports improvement of pain.   Abdomen exam revealed persistent RLQ tenderness. Ordered CTAP to evaluate for acute appy. Morales PGY1  CTAP negative for intraabdominal pathologies. Will DC with PMD FU. Morales PGY1  CTAP negative for intraabdominal pathologies.   Reassessed and reports that she is feeling much better.   Will DC with PMD and GI FU.

## 2022-06-29 ENCOUNTER — EMERGENCY (EMERGENCY)
Facility: HOSPITAL | Age: 32
LOS: 0 days | Discharge: HOME | End: 2022-06-30
Attending: EMERGENCY MEDICINE

## 2022-06-29 VITALS
DIASTOLIC BLOOD PRESSURE: 87 MMHG | SYSTOLIC BLOOD PRESSURE: 135 MMHG | OXYGEN SATURATION: 96 % | RESPIRATION RATE: 16 BRPM | HEART RATE: 105 BPM | HEIGHT: 63 IN | TEMPERATURE: 98 F | WEIGHT: 238.1 LBS

## 2022-06-29 DIAGNOSIS — J45.909 UNSPECIFIED ASTHMA, UNCOMPLICATED: ICD-10-CM

## 2022-06-29 DIAGNOSIS — S50.311A ABRASION OF RIGHT ELBOW, INITIAL ENCOUNTER: ICD-10-CM

## 2022-06-29 DIAGNOSIS — S50.312A ABRASION OF LEFT ELBOW, INITIAL ENCOUNTER: ICD-10-CM
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
## 2022-06-29 DIAGNOSIS — Z91.010 ALLERGY TO PEANUTS: ICD-10-CM

## 2022-06-29 DIAGNOSIS — Z91.018 ALLERGY TO OTHER FOODS: ICD-10-CM

## 2022-06-29 DIAGNOSIS — Z20.6 CONTACT WITH AND (SUSPECTED) EXPOSURE TO HUMAN IMMUNODEFICIENCY VIRUS [HIV]: ICD-10-CM

## 2022-06-29 DIAGNOSIS — R51.9 HEADACHE, UNSPECIFIED: ICD-10-CM

## 2022-06-29 DIAGNOSIS — S70.311A ABRASION, RIGHT THIGH, INITIAL ENCOUNTER: ICD-10-CM

## 2022-06-29 DIAGNOSIS — S80.212A ABRASION, LEFT KNEE, INITIAL ENCOUNTER: ICD-10-CM

## 2022-06-29 DIAGNOSIS — Z88.8 ALLERGY STATUS TO OTHER DRUGS, MEDICAMENTS AND BIOLOGICAL SUBSTANCES STATUS: ICD-10-CM
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
## 2022-06-29 DIAGNOSIS — S91.105A UNSPECIFIED OPEN WOUND OF LEFT LESSER TOE(S) WITHOUT DAMAGE TO NAIL, INITIAL ENCOUNTER: ICD-10-CM

## 2022-06-29 PROCEDURE — 99284 EMERGENCY DEPT VISIT MOD MDM: CPT
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
## 2022-06-30 VITALS
SYSTOLIC BLOOD PRESSURE: 147 MMHG | HEART RATE: 92 BPM | DIASTOLIC BLOOD PRESSURE: 96 MMHG | OXYGEN SATURATION: 97 % | RESPIRATION RATE: 18 BRPM | TEMPERATURE: 98 F

## 2022-06-30 LAB — HIV 1 & 2 AB SERPL IA.RAPID: SIGNIFICANT CHANGE UP

## 2022-06-30 RX ORDER — ACETAMINOPHEN 500 MG
650 TABLET ORAL ONCE
Refills: 0 | Status: COMPLETED | OUTPATIENT
Start: 2022-06-30 | End: 2022-06-30

## 2022-06-30 RX ORDER — EMTRICITABINE AND TENOFOVIR DISOPROXIL FUMARATE 200; 300 MG/1; MG/1
1 TABLET, FILM COATED ORAL ONCE
Refills: 0 | Status: COMPLETED | OUTPATIENT
Start: 2022-06-30 | End: 2022-06-30

## 2022-06-30 RX ORDER — EMTRICITABINE AND TENOFOVIR DISOPROXIL FUMARATE 200; 300 MG/1; MG/1
1 TABLET, FILM COATED ORAL
Qty: 28 | Refills: 0
Start: 2022-06-30 | End: 2022-07-27

## 2022-06-30 RX ORDER — TRAMADOL HYDROCHLORIDE 50 MG/1
50 TABLET ORAL ONCE
Refills: 0 | Status: DISCONTINUED | OUTPATIENT
Start: 2022-06-30 | End: 2022-06-30

## 2022-06-30 RX ORDER — DOLUTEGRAVIR SODIUM 25 MG/1
1 TABLET, FILM COATED ORAL
Qty: 28 | Refills: 0
Start: 2022-06-30 | End: 2022-07-27

## 2022-06-30 RX ORDER — DOLUTEGRAVIR SODIUM 25 MG/1
50 TABLET, FILM COATED ORAL ONCE
Refills: 0 | Status: COMPLETED | OUTPATIENT
Start: 2022-06-30 | End: 2022-06-30

## 2022-06-30 RX ADMIN — EMTRICITABINE AND TENOFOVIR DISOPROXIL FUMARATE 1 TABLET(S): 200; 300 TABLET, FILM COATED ORAL at 02:45

## 2022-06-30 RX ADMIN — DOLUTEGRAVIR SODIUM 50 MILLIGRAM(S): 25 TABLET, FILM COATED ORAL at 02:45
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
## 2022-06-30 NOTE — ED PROVIDER NOTE - NSFOLLOWUPINSTRUCTIONS_ED_ALL_ED_FT
Take the Post-Exposure-Prophylaxis completely for the full month to prevent transmission of HIV.       Be sure to test for HIV again in 6 months.

## 2022-06-30 NOTE — ED PROVIDER NOTE - PHYSICAL EXAMINATION
Gen: Alert, NAD  Head: NC, AT   Eyes: PERRL, EOMI, normal lids/conjunctiva  ENT: normal hearing, patent oropharynx without erythema/exudate, uvula midline  Neck: supple, no tenderness, Trachea midline  Pulm: Bilateral BS, normal resp effort, no wheeze/stridor/retractions  CV: RRR, no M/R/G, 2+ radial and dp pulses bl, no edema  Abd: soft, NT/ND, +BS, no hepatosplenomegaly  Mskel: extremities x4 with normal ROM and no joint effusions. no ctl spine ttp.   Skin: excoriations right face around eye, right elbow abrasion, left elbow abrasion, large left knee abrasion, excoriations right thigh, small skin avulsion left pinky toe   Neuro: AAOx3, no sensory/motor deficits, CN 2-12 intact

## 2022-06-30 NOTE — ED PROVIDER NOTE - PATIENT PORTAL LINK FT
You can access the FollowMyHealth Patient Portal offered by [facility] by registering at the following website: [email or web address]. By joining [employer]’s FollowMyHealth portal, you will also be able to view your health information using other applications (apps) compatible with our system.

## 2022-06-30 NOTE — ED ADULT NURSE NOTE - NSIMPLEMENTINTERV_GEN_ALL_ED
Implemented All Universal Safety Interventions:  [place] to call system. Call bell, personal items and telephone within reach. Instruct patient to call for assistance. Room bathroom lighting operational. Non-slip footwear when patient is off stretcher. Physically safe environment: no spills, clutter or unnecessary equipment. Stretcher in lowest position, wheels locked, appropriate side rails in place.
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
## 2022-07-05 ENCOUNTER — APPOINTMENT (OUTPATIENT)
Dept: INTERNAL MEDICINE | Facility: CLINIC | Age: 32
End: 2022-07-05

## 2022-08-05 ENCOUNTER — APPOINTMENT (OUTPATIENT)
Dept: INTERNAL MEDICINE | Facility: AMBULATORY MEDICAL SERVICES | Age: 32
End: 2022-08-05

## 2022-08-05 PROCEDURE — 43239 EGD BIOPSY SINGLE/MULTIPLE: CPT

## 2022-08-05 RX ORDER — PANTOPRAZOLE 40 MG/1
40 TABLET, DELAYED RELEASE ORAL DAILY
Qty: 90 | Refills: 3 | Status: ACTIVE | COMMUNITY
Start: 2022-08-05 | End: 1900-01-01

## 2022-10-28 NOTE — ED PROVIDER NOTE - NS ED MD EM SELECTION
· Lactate 6 1 on admit, now 2 0 s/p 2L fluid bolus resuscitation  · Likely 2/2 hypovolemia, resolved [id number] Detailed

## 2022-12-07 ENCOUNTER — APPOINTMENT (OUTPATIENT)
Dept: INTERNAL MEDICINE | Facility: CLINIC | Age: 32
End: 2022-12-07

## 2022-12-15 NOTE — ED PROVIDER NOTE - DISPOSITION TYPE
Transplant Suitability Follow-Up:    Sw called pt at ([phone]) to follow-up on psychosocial concerns.     Pt reports pt's daughter, Carmel Vera ([phone]) and son-in-law able and willing to be backup caregivers- both drive, per pt.     Pt reports starting on PD in September 2022 and tolerating dialysis well. Pt states may have hernia and has plans to have a CT scan soon.     Pt reports believing [facility]  has applied for pt's Medicare benefits, pt agrees to call transplant team with any updates on insurance.    Pt remains low risk for transplant from a psychosocial perspective.    Jeanie Conner  
DISCHARGE

## 2023-02-20 ENCOUNTER — APPOINTMENT (OUTPATIENT)
Dept: INTERNAL MEDICINE | Facility: CLINIC | Age: 33
End: 2023-02-20
Payer: COMMERCIAL

## 2023-02-20 VITALS
SYSTOLIC BLOOD PRESSURE: 137 MMHG | HEART RATE: 88 BPM | HEIGHT: 63 IN | OXYGEN SATURATION: 97 % | DIASTOLIC BLOOD PRESSURE: 83 MMHG | WEIGHT: 245 LBS | BODY MASS INDEX: 43.41 KG/M2

## 2023-02-20 DIAGNOSIS — G47.33 OBSTRUCTIVE SLEEP APNEA (ADULT) (PEDIATRIC): ICD-10-CM

## 2023-02-20 DIAGNOSIS — K21.9 GASTRO-ESOPHAGEAL REFLUX DISEASE W/OUT ESOPHAGITIS: ICD-10-CM

## 2023-02-20 PROCEDURE — 99213 OFFICE O/P EST LOW 20 MIN: CPT

## 2023-02-20 RX ORDER — AMOXICILLIN 500 MG/1
500 TABLET, FILM COATED ORAL
Qty: 20 | Refills: 0 | Status: COMPLETED | COMMUNITY
Start: 2022-11-14

## 2023-02-20 RX ORDER — CHLORHEXIDINE GLUCONATE, 0.12% ORAL RINSE 1.2 MG/ML
0.12 SOLUTION DENTAL
Qty: 473 | Refills: 0 | Status: COMPLETED | COMMUNITY
Start: 2022-11-14

## 2023-02-20 RX ORDER — AMOXICILLIN AND CLAVULANATE POTASSIUM 875; 125 MG/1; MG/1
875-125 TABLET, COATED ORAL
Qty: 20 | Refills: 0 | Status: COMPLETED | COMMUNITY
Start: 2022-10-27

## 2023-02-20 RX ORDER — CYCLOBENZAPRINE HYDROCHLORIDE 10 MG/1
10 TABLET, FILM COATED ORAL
Qty: 21 | Refills: 0 | Status: COMPLETED | COMMUNITY
Start: 2022-11-03

## 2023-02-20 NOTE — PHYSICAL EXAM
[Alert] : alert [Normal Voice/Communication] : normal voice/communication [Healthy Appearing] : healthy appearing [No Acute Distress] : no acute distress [Sclera] : the sclera and conjunctiva were normal [Hearing Threshold Finger Rub Not [place]] : hearing was normal [Normal Lips/Gums] : the lips and gums were normal [Oropharynx] : the oropharynx was normal [Normal Appearance] : the appearance of the neck was normal [No Neck Mass] : no neck mass was observed [No Respiratory Distress] : no respiratory distress [No Acc Muscle Use] : no accessory muscle use [Respiration, Rhythm And Depth] : normal respiratory rhythm and effort [Auscultation Breath Sounds / Voice Sounds] : lungs were clear to auscultation bilaterally [Heart Rate And Rhythm] : heart rate was normal and rhythm regular [Normal S1, S2] : normal S1 and S2 [Murmurs] : no murmurs [Bowel Sounds] : normal bowel sounds [Abdomen Tenderness] : non-tender [No Masses] : no abdominal mass palpated [Abdomen Soft] : soft [] : no hepatosplenomegaly [Oriented To Time, Place, And Person] : oriented to person, place, and time

## 2023-02-20 NOTE — ASSESSMENT
[FreeTextEntry1] : align once a day,\par Benefiber, and avoid gassy foods. \par will discuss colonoscopy next visit
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
## 2023-02-23 ENCOUNTER — APPOINTMENT (OUTPATIENT)
Dept: FAMILY MEDICINE | Facility: CLINIC | Age: 33
End: 2023-02-23
Payer: COMMERCIAL

## 2023-02-23 ENCOUNTER — NON-APPOINTMENT (OUTPATIENT)
Age: 33
End: 2023-02-23

## 2023-02-23 VITALS
WEIGHT: 245 LBS | OXYGEN SATURATION: 97 % | DIASTOLIC BLOOD PRESSURE: 78 MMHG | HEART RATE: 104 BPM | SYSTOLIC BLOOD PRESSURE: 132 MMHG | TEMPERATURE: 98.7 F | HEIGHT: 63 IN | BODY MASS INDEX: 43.41 KG/M2

## 2023-02-23 DIAGNOSIS — N39.0 URINARY TRACT INFECTION, SITE NOT SPECIFIED: ICD-10-CM

## 2023-02-23 PROCEDURE — 93000 ELECTROCARDIOGRAM COMPLETE: CPT

## 2023-02-23 PROCEDURE — 99214 OFFICE O/P EST MOD 30 MIN: CPT | Mod: 25

## 2023-02-23 PROCEDURE — 36415 COLL VENOUS BLD VENIPUNCTURE: CPT

## 2023-02-23 RX ORDER — ALBUTEROL SULFATE 2.5 MG/3ML
(2.5 MG/3ML) SOLUTION RESPIRATORY (INHALATION)
Qty: 1 | Refills: 0 | Status: DISCONTINUED | COMMUNITY
Start: 2020-03-25 | End: 2023-02-23

## 2023-02-23 RX ORDER — ALBUTEROL SULFATE 90 UG/1
108 (90 BASE) INHALANT RESPIRATORY (INHALATION)
Refills: 0 | Status: DISCONTINUED | COMMUNITY
Start: 2022-04-06 | End: 2023-02-23

## 2023-02-23 RX ORDER — CEFUROXIME AXETIL 250 MG/1
250 TABLET ORAL
Qty: 7 | Refills: 0 | Status: ACTIVE | COMMUNITY
Start: 2023-02-23

## 2023-02-23 RX ORDER — FAMOTIDINE 20 MG/1
20 TABLET, FILM COATED ORAL
Qty: 90 | Refills: 0 | Status: ACTIVE | COMMUNITY
Start: 2023-02-23 | End: 1900-01-01

## 2023-02-26 NOTE — REVIEW OF SYSTEMS
[Fatigue] : fatigue [Lower Ext Edema] : lower extremity edema [Frequency] : frequency [Joint Pain] : joint pain [Anxiety] : anxiety [Negative] : Heme/Lymph [Fever] : no fever [Chills] : no chills [Hot Flashes] : no hot flashes [Night Sweats] : no night sweats [Recent Change In Weight] : ~T no recent weight change [Earache] : no earache [Hearing Loss] : no hearing loss [Nosebleed] : no nosebleeds [Hoarseness] : no hoarseness [Nasal Discharge] : no nasal discharge [Sore Throat] : no sore throat [Postnasal Drip] : no postnasal drip [Chest Pain] : no chest pain [Palpitations] : no palpitations [Leg Claudication] : no leg claudication [Orthopnea] : no orthopnea [Paroxysmal Nocturnal Dyspnea] : no paroxysmal nocturnal dyspnea [Shortness Of Breath] : no shortness of breath [Wheezing] : no wheezing [Cough] : no cough [Dyspnea on Exertion] : no dyspnea on exertion [Dysuria] : no dysuria [FreeTextEntry4] : nasal congestion [FreeTextEntry6] : s [FreeTextEntry8] : suprapubic pain [FreeTextEntry9] : left knee pain
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
## 2023-02-26 NOTE — PHYSICAL EXAM
[No Acute Distress] : no acute distress [Well Nourished] : well nourished [Well Developed] : well developed [Well-Appearing] : well-appearing [Normal Sclera/Conjunctiva] : normal sclera/conjunctiva [PERRL] : pupils equal round and reactive to light [EOMI] : extraocular movements intact [Normal Outer Ear/Nose] : the outer ears and nose were normal in appearance [Normal Oropharynx] : the oropharynx was normal [No JVD] : no jugular venous distention [No Lymphadenopathy] : no lymphadenopathy [Supple] : supple [Thyroid Normal, No Nodules] : the thyroid was normal and there were no nodules present [No Respiratory Distress] : no respiratory distress  [No Accessory Muscle Use] : no accessory muscle use [Clear to Auscultation] : lungs were clear to auscultation bilaterally [Normal Rate] : normal rate  [Regular Rhythm] : with a regular rhythm [Normal S1, S2] : normal S1 and S2 [No Murmur] : no murmur heard [No Carotid Bruits] : no carotid bruits [No Abdominal Bruit] : a ~M bruit was not heard ~T in the abdomen [No Varicosities] : no varicosities [Pedal Pulses Present] : the pedal pulses are present [No Edema] : there was no peripheral edema [No Palpable Aorta] : no palpable aorta [No Extremity Clubbing/Cyanosis] : no extremity clubbing/cyanosis [Soft] : abdomen soft [Non-distended] : non-distended [No Masses] : no abdominal mass palpated [No HSM] : no HSM [Normal Bowel Sounds] : normal bowel sounds [Normal Posterior Cervical Nodes] : no posterior cervical lymphadenopathy [Normal Anterior Cervical Nodes] : no anterior cervical lymphadenopathy [No CVA Tenderness] : no CVA  tenderness [No Spinal Tenderness] : no spinal tenderness [No Joint Swelling] : no joint swelling [Grossly Normal Strength/Tone] : grossly normal strength/tone [No Rash] : no rash [Coordination Grossly Intact] : coordination grossly intact [No Focal Deficits] : no focal deficits [Normal Gait] : normal gait [Deep Tendon Reflexes (DTR)] : deep tendon reflexes were 2+ and symmetric [Normal Affect] : the affect was normal [Normal Insight/Judgement] : insight and judgment were intact [de-identified] : epigastric tenderness to palpation

## 2023-02-28 ENCOUNTER — TRANSCRIPTION ENCOUNTER (OUTPATIENT)
Age: 33
End: 2023-02-28

## 2023-03-01 ENCOUNTER — TRANSCRIPTION ENCOUNTER (OUTPATIENT)
Age: 33
End: 2023-03-01

## 2023-03-01 RX ORDER — ERGOCALCIFEROL 1.25 MG/1
1.25 MG CAPSULE, LIQUID FILLED ORAL
Qty: 12 | Refills: 3 | Status: ACTIVE | COMMUNITY
Start: 2023-03-01 | End: 1900-01-01

## 2023-03-09 ENCOUNTER — TRANSCRIPTION ENCOUNTER (OUTPATIENT)
Age: 33
End: 2023-03-09

## 2023-03-10 ENCOUNTER — TRANSCRIPTION ENCOUNTER (OUTPATIENT)
Age: 33
End: 2023-03-10

## 2023-04-18 ENCOUNTER — APPOINTMENT (OUTPATIENT)
Dept: FAMILY MEDICINE | Facility: CLINIC | Age: 33
End: 2023-04-18

## 2023-04-19 ENCOUNTER — APPOINTMENT (OUTPATIENT)
Dept: INTERNAL MEDICINE | Facility: CLINIC | Age: 33
End: 2023-04-19

## 2023-06-19 RX ORDER — SODIUM SULFATE, MAGNESIUM SULFATE, AND POTASSIUM CHLORIDE 17.75; 2.7; 2.25 G/1; G/1; G/1
1479-225-188 TABLET ORAL
Qty: 1 | Refills: 0 | Status: ACTIVE | COMMUNITY
Start: 2023-06-19 | End: 1900-01-01

## 2023-09-14 ENCOUNTER — APPOINTMENT (OUTPATIENT)
Dept: FAMILY MEDICINE | Facility: CLINIC | Age: 33
End: 2023-09-14

## 2023-10-06 ENCOUNTER — APPOINTMENT (OUTPATIENT)
Dept: FAMILY MEDICINE | Facility: CLINIC | Age: 33
End: 2023-10-06

## 2023-10-09 ENCOUNTER — APPOINTMENT (OUTPATIENT)
Dept: INTERNAL MEDICINE | Facility: AMBULATORY MEDICAL SERVICES | Age: 33
End: 2023-10-09

## 2023-10-17 NOTE — PRE-ANESTHESIA EVALUATION ADULT - MALLAMPATI CLASS
Refill Routing Note   Medication(s) are not appropriate for processing by [facility] for the following reason(s):      No active prescription written by provider: previously dc'ed by PCP    [facility] action(s):  Defer Care Due:  None identified   Medication Therapy Plan:         Appointments  past 12m or future 3m with PCP    Date Provider   Last Visit   8/14/2023 Ana Mayer MD   Next Visit   11/8/2023 Ana Mayer MD   ED visits in past 90 days: 0        Note composed:10:09 PM 10/16/2023            Class III - visualization of the soft palate and the base of the uvula

## 2023-12-23 ENCOUNTER — EMERGENCY (EMERGENCY)
Facility: HOSPITAL | Age: 33
LOS: 0 days | Discharge: HOME | End: 2023-12-23
Attending: STUDENT IN AN ORGANIZED HEALTH CARE EDUCATION/TRAINING PROGRAM
Payer: COMMERCIAL

## 2023-12-23 VITALS
RESPIRATION RATE: 16 BRPM | HEIGHT: 63 IN | WEIGHT: 240.08 LBS | OXYGEN SATURATION: 96 % | DIASTOLIC BLOOD PRESSURE: 76 MMHG | TEMPERATURE: 100 F | HEART RATE: 125 BPM | SYSTOLIC BLOOD PRESSURE: 113 MMHG

## 2023-12-23 VITALS
OXYGEN SATURATION: 98 % | HEART RATE: 98 BPM | DIASTOLIC BLOOD PRESSURE: 92 MMHG | SYSTOLIC BLOOD PRESSURE: 141 MMHG | RESPIRATION RATE: 17 BRPM

## 2023-12-23 DIAGNOSIS — Z91.010 ALLERGY TO PEANUTS: ICD-10-CM

## 2023-12-23 DIAGNOSIS — Z88.8 ALLERGY STATUS TO OTHER DRUGS, MEDICAMENTS AND BIOLOGICAL SUBSTANCES: ICD-10-CM

## 2023-12-23 DIAGNOSIS — J18.9 PNEUMONIA, UNSPECIFIED ORGANISM: ICD-10-CM

## 2023-12-23 DIAGNOSIS — R53.1 WEAKNESS: ICD-10-CM

## 2023-12-23 DIAGNOSIS — J45.909 UNSPECIFIED ASTHMA, UNCOMPLICATED: ICD-10-CM

## 2023-12-23 DIAGNOSIS — J11.1 INFLUENZA DUE TO UNIDENTIFIED INFLUENZA VIRUS WITH OTHER RESPIRATORY MANIFESTATIONS: ICD-10-CM

## 2023-12-23 DIAGNOSIS — Z20.822 CONTACT WITH AND (SUSPECTED) EXPOSURE TO COVID-19: ICD-10-CM

## 2023-12-23 DIAGNOSIS — R50.9 FEVER, UNSPECIFIED: ICD-10-CM

## 2023-12-23 DIAGNOSIS — Z91.018 ALLERGY TO OTHER FOODS: ICD-10-CM

## 2023-12-23 DIAGNOSIS — Z88.6 ALLERGY STATUS TO ANALGESIC AGENT: ICD-10-CM

## 2023-12-23 DIAGNOSIS — R05.9 COUGH, UNSPECIFIED: ICD-10-CM

## 2023-12-23 LAB
ALBUMIN SERPL ELPH-MCNC: 3.4 G/DL — SIGNIFICANT CHANGE UP (ref 3.3–5)
ALBUMIN SERPL ELPH-MCNC: 3.4 G/DL — SIGNIFICANT CHANGE UP (ref 3.3–5)
ALP SERPL-CCNC: 95 U/L — SIGNIFICANT CHANGE UP (ref 40–120)
ALP SERPL-CCNC: 95 U/L — SIGNIFICANT CHANGE UP (ref 40–120)
ALT FLD-CCNC: 39 U/L — SIGNIFICANT CHANGE UP (ref 12–78)
ALT FLD-CCNC: 39 U/L — SIGNIFICANT CHANGE UP (ref 12–78)
ANION GAP SERPL CALC-SCNC: 7 MMOL/L — SIGNIFICANT CHANGE UP (ref 5–17)
ANION GAP SERPL CALC-SCNC: 7 MMOL/L — SIGNIFICANT CHANGE UP (ref 5–17)
APPEARANCE UR: CLEAR — SIGNIFICANT CHANGE UP
APPEARANCE UR: CLEAR — SIGNIFICANT CHANGE UP
APTT BLD: 26.9 SEC — SIGNIFICANT CHANGE UP (ref 24.5–35.6)
APTT BLD: 26.9 SEC — SIGNIFICANT CHANGE UP (ref 24.5–35.6)
AST SERPL-CCNC: 12 U/L — LOW (ref 15–37)
AST SERPL-CCNC: 12 U/L — LOW (ref 15–37)
BACTERIA # UR AUTO: ABNORMAL /HPF
BACTERIA # UR AUTO: ABNORMAL /HPF
BASOPHILS # BLD AUTO: 0.04 K/UL — SIGNIFICANT CHANGE UP (ref 0–0.2)
BASOPHILS # BLD AUTO: 0.04 K/UL — SIGNIFICANT CHANGE UP (ref 0–0.2)
BASOPHILS NFR BLD AUTO: 0.5 % — SIGNIFICANT CHANGE UP (ref 0–2)
BASOPHILS NFR BLD AUTO: 0.5 % — SIGNIFICANT CHANGE UP (ref 0–2)
BILIRUB SERPL-MCNC: 0.2 MG/DL — SIGNIFICANT CHANGE UP (ref 0.2–1.2)
BILIRUB SERPL-MCNC: 0.2 MG/DL — SIGNIFICANT CHANGE UP (ref 0.2–1.2)
BILIRUB UR-MCNC: NEGATIVE — SIGNIFICANT CHANGE UP
BILIRUB UR-MCNC: NEGATIVE — SIGNIFICANT CHANGE UP
BUN SERPL-MCNC: 7 MG/DL — SIGNIFICANT CHANGE UP (ref 7–23)
BUN SERPL-MCNC: 7 MG/DL — SIGNIFICANT CHANGE UP (ref 7–23)
CALCIUM SERPL-MCNC: 8.7 MG/DL — SIGNIFICANT CHANGE UP (ref 8.5–10.1)
CALCIUM SERPL-MCNC: 8.7 MG/DL — SIGNIFICANT CHANGE UP (ref 8.5–10.1)
CHLORIDE SERPL-SCNC: 111 MMOL/L — HIGH (ref 96–108)
CHLORIDE SERPL-SCNC: 111 MMOL/L — HIGH (ref 96–108)
CO2 SERPL-SCNC: 23 MMOL/L — SIGNIFICANT CHANGE UP (ref 22–31)
CO2 SERPL-SCNC: 23 MMOL/L — SIGNIFICANT CHANGE UP (ref 22–31)
COLOR SPEC: YELLOW — SIGNIFICANT CHANGE UP
COLOR SPEC: YELLOW — SIGNIFICANT CHANGE UP
CREAT SERPL-MCNC: 0.82 MG/DL — SIGNIFICANT CHANGE UP (ref 0.5–1.3)
CREAT SERPL-MCNC: 0.82 MG/DL — SIGNIFICANT CHANGE UP (ref 0.5–1.3)
DIFF PNL FLD: ABNORMAL
DIFF PNL FLD: ABNORMAL
EGFR: 96 ML/MIN/1.73M2 — SIGNIFICANT CHANGE UP
EGFR: 96 ML/MIN/1.73M2 — SIGNIFICANT CHANGE UP
EOSINOPHIL # BLD AUTO: 0.12 K/UL — SIGNIFICANT CHANGE UP (ref 0–0.5)
EOSINOPHIL # BLD AUTO: 0.12 K/UL — SIGNIFICANT CHANGE UP (ref 0–0.5)
EOSINOPHIL NFR BLD AUTO: 1.6 % — SIGNIFICANT CHANGE UP (ref 0–6)
EOSINOPHIL NFR BLD AUTO: 1.6 % — SIGNIFICANT CHANGE UP (ref 0–6)
EPI CELLS # UR: PRESENT
EPI CELLS # UR: PRESENT
FLUAV H3 RNA SPEC QL NAA+PROBE: DETECTED
FLUAV H3 RNA SPEC QL NAA+PROBE: DETECTED
GLUCOSE SERPL-MCNC: 101 MG/DL — HIGH (ref 70–99)
GLUCOSE SERPL-MCNC: 101 MG/DL — HIGH (ref 70–99)
GLUCOSE UR QL: NEGATIVE MG/DL — SIGNIFICANT CHANGE UP
GLUCOSE UR QL: NEGATIVE MG/DL — SIGNIFICANT CHANGE UP
HCT VFR BLD CALC: 38.4 % — SIGNIFICANT CHANGE UP (ref 34.5–45)
HCT VFR BLD CALC: 38.4 % — SIGNIFICANT CHANGE UP (ref 34.5–45)
HGB BLD-MCNC: 12.9 G/DL — SIGNIFICANT CHANGE UP (ref 11.5–15.5)
HGB BLD-MCNC: 12.9 G/DL — SIGNIFICANT CHANGE UP (ref 11.5–15.5)
HYALINE CASTS # UR AUTO: SIGNIFICANT CHANGE UP
HYALINE CASTS # UR AUTO: SIGNIFICANT CHANGE UP
IMM GRANULOCYTES NFR BLD AUTO: 0.4 % — SIGNIFICANT CHANGE UP (ref 0–0.9)
IMM GRANULOCYTES NFR BLD AUTO: 0.4 % — SIGNIFICANT CHANGE UP (ref 0–0.9)
INR BLD: 0.97 RATIO — SIGNIFICANT CHANGE UP (ref 0.85–1.18)
INR BLD: 0.97 RATIO — SIGNIFICANT CHANGE UP (ref 0.85–1.18)
KETONES UR-MCNC: ABNORMAL MG/DL
KETONES UR-MCNC: ABNORMAL MG/DL
LACTATE SERPL-SCNC: 1.1 MMOL/L — SIGNIFICANT CHANGE UP (ref 0.7–2)
LACTATE SERPL-SCNC: 1.1 MMOL/L — SIGNIFICANT CHANGE UP (ref 0.7–2)
LEUKOCYTE ESTERASE UR-ACNC: NEGATIVE — SIGNIFICANT CHANGE UP
LEUKOCYTE ESTERASE UR-ACNC: NEGATIVE — SIGNIFICANT CHANGE UP
LYMPHOCYTES # BLD AUTO: 1.4 K/UL — SIGNIFICANT CHANGE UP (ref 1–3.3)
LYMPHOCYTES # BLD AUTO: 1.4 K/UL — SIGNIFICANT CHANGE UP (ref 1–3.3)
LYMPHOCYTES # BLD AUTO: 18.5 % — SIGNIFICANT CHANGE UP (ref 13–44)
LYMPHOCYTES # BLD AUTO: 18.5 % — SIGNIFICANT CHANGE UP (ref 13–44)
MCHC RBC-ENTMCNC: 29.3 PG — SIGNIFICANT CHANGE UP (ref 27–34)
MCHC RBC-ENTMCNC: 29.3 PG — SIGNIFICANT CHANGE UP (ref 27–34)
MCHC RBC-ENTMCNC: 33.6 G/DL — SIGNIFICANT CHANGE UP (ref 32–36)
MCHC RBC-ENTMCNC: 33.6 G/DL — SIGNIFICANT CHANGE UP (ref 32–36)
MCV RBC AUTO: 87.3 FL — SIGNIFICANT CHANGE UP (ref 80–100)
MCV RBC AUTO: 87.3 FL — SIGNIFICANT CHANGE UP (ref 80–100)
MONOCYTES # BLD AUTO: 0.85 K/UL — SIGNIFICANT CHANGE UP (ref 0–0.9)
MONOCYTES # BLD AUTO: 0.85 K/UL — SIGNIFICANT CHANGE UP (ref 0–0.9)
MONOCYTES NFR BLD AUTO: 11.2 % — SIGNIFICANT CHANGE UP (ref 2–14)
MONOCYTES NFR BLD AUTO: 11.2 % — SIGNIFICANT CHANGE UP (ref 2–14)
NEUTROPHILS # BLD AUTO: 5.14 K/UL — SIGNIFICANT CHANGE UP (ref 1.8–7.4)
NEUTROPHILS # BLD AUTO: 5.14 K/UL — SIGNIFICANT CHANGE UP (ref 1.8–7.4)
NEUTROPHILS NFR BLD AUTO: 67.8 % — SIGNIFICANT CHANGE UP (ref 43–77)
NEUTROPHILS NFR BLD AUTO: 67.8 % — SIGNIFICANT CHANGE UP (ref 43–77)
NITRITE UR-MCNC: NEGATIVE — SIGNIFICANT CHANGE UP
NITRITE UR-MCNC: NEGATIVE — SIGNIFICANT CHANGE UP
NRBC # BLD: 0 /100 WBCS — SIGNIFICANT CHANGE UP (ref 0–0)
NRBC # BLD: 0 /100 WBCS — SIGNIFICANT CHANGE UP (ref 0–0)
PH UR: 6.5 — SIGNIFICANT CHANGE UP (ref 5–8)
PH UR: 6.5 — SIGNIFICANT CHANGE UP (ref 5–8)
PLATELET # BLD AUTO: 222 K/UL — SIGNIFICANT CHANGE UP (ref 150–400)
PLATELET # BLD AUTO: 222 K/UL — SIGNIFICANT CHANGE UP (ref 150–400)
POTASSIUM SERPL-MCNC: 3.6 MMOL/L — SIGNIFICANT CHANGE UP (ref 3.5–5.3)
POTASSIUM SERPL-MCNC: 3.6 MMOL/L — SIGNIFICANT CHANGE UP (ref 3.5–5.3)
POTASSIUM SERPL-SCNC: 3.6 MMOL/L — SIGNIFICANT CHANGE UP (ref 3.5–5.3)
POTASSIUM SERPL-SCNC: 3.6 MMOL/L — SIGNIFICANT CHANGE UP (ref 3.5–5.3)
PROT SERPL-MCNC: 7.3 GM/DL — SIGNIFICANT CHANGE UP (ref 6–8.3)
PROT SERPL-MCNC: 7.3 GM/DL — SIGNIFICANT CHANGE UP (ref 6–8.3)
PROT UR-MCNC: NEGATIVE MG/DL — SIGNIFICANT CHANGE UP
PROT UR-MCNC: NEGATIVE MG/DL — SIGNIFICANT CHANGE UP
PROTHROM AB SERPL-ACNC: 11.6 SEC — SIGNIFICANT CHANGE UP (ref 9.5–13)
PROTHROM AB SERPL-ACNC: 11.6 SEC — SIGNIFICANT CHANGE UP (ref 9.5–13)
RAPID RVP RESULT: DETECTED
RAPID RVP RESULT: DETECTED
RBC # BLD: 4.4 M/UL — SIGNIFICANT CHANGE UP (ref 3.8–5.2)
RBC # BLD: 4.4 M/UL — SIGNIFICANT CHANGE UP (ref 3.8–5.2)
RBC # FLD: 12.6 % — SIGNIFICANT CHANGE UP (ref 10.3–14.5)
RBC # FLD: 12.6 % — SIGNIFICANT CHANGE UP (ref 10.3–14.5)
RBC CASTS # UR COMP ASSIST: SIGNIFICANT CHANGE UP /HPF (ref 0–4)
RBC CASTS # UR COMP ASSIST: SIGNIFICANT CHANGE UP /HPF (ref 0–4)
SARS-COV-2 RNA SPEC QL NAA+PROBE: SIGNIFICANT CHANGE UP
SARS-COV-2 RNA SPEC QL NAA+PROBE: SIGNIFICANT CHANGE UP
SODIUM SERPL-SCNC: 141 MMOL/L — SIGNIFICANT CHANGE UP (ref 135–145)
SODIUM SERPL-SCNC: 141 MMOL/L — SIGNIFICANT CHANGE UP (ref 135–145)
SP GR SPEC: 1.01 — SIGNIFICANT CHANGE UP (ref 1–1.03)
SP GR SPEC: 1.01 — SIGNIFICANT CHANGE UP (ref 1–1.03)
UROBILINOGEN FLD QL: 0.2 MG/DL — SIGNIFICANT CHANGE UP (ref 0.2–1)
UROBILINOGEN FLD QL: 0.2 MG/DL — SIGNIFICANT CHANGE UP (ref 0.2–1)
WBC # BLD: 7.58 K/UL — SIGNIFICANT CHANGE UP (ref 3.8–10.5)
WBC # BLD: 7.58 K/UL — SIGNIFICANT CHANGE UP (ref 3.8–10.5)
WBC # FLD AUTO: 7.58 K/UL — SIGNIFICANT CHANGE UP (ref 3.8–10.5)
WBC # FLD AUTO: 7.58 K/UL — SIGNIFICANT CHANGE UP (ref 3.8–10.5)
WBC UR QL: 0 /HPF — SIGNIFICANT CHANGE UP (ref 0–5)
WBC UR QL: 0 /HPF — SIGNIFICANT CHANGE UP (ref 0–5)

## 2023-12-23 PROCEDURE — 93010 ELECTROCARDIOGRAM REPORT: CPT

## 2023-12-23 PROCEDURE — 99285 EMERGENCY DEPT VISIT HI MDM: CPT

## 2023-12-23 PROCEDURE — 71045 X-RAY EXAM CHEST 1 VIEW: CPT | Mod: 26

## 2023-12-23 RX ORDER — SODIUM CHLORIDE 9 MG/ML
2000 INJECTION INTRAMUSCULAR; INTRAVENOUS; SUBCUTANEOUS ONCE
Refills: 0 | Status: COMPLETED | OUTPATIENT
Start: 2023-12-23 | End: 2023-12-23

## 2023-12-23 RX ORDER — IPRATROPIUM/ALBUTEROL SULFATE 18-103MCG
3 AEROSOL WITH ADAPTER (GRAM) INHALATION
Refills: 0 | Status: COMPLETED | OUTPATIENT
Start: 2023-12-23 | End: 2023-12-23

## 2023-12-23 RX ORDER — METOCLOPRAMIDE HCL 10 MG
10 TABLET ORAL ONCE
Refills: 0 | Status: COMPLETED | OUTPATIENT
Start: 2023-12-23 | End: 2023-12-23

## 2023-12-23 RX ORDER — MAGNESIUM SULFATE 500 MG/ML
2 VIAL (ML) INJECTION ONCE
Refills: 0 | Status: COMPLETED | OUTPATIENT
Start: 2023-12-23 | End: 2023-12-23

## 2023-12-23 RX ORDER — CEFTRIAXONE 500 MG/1
1000 INJECTION, POWDER, FOR SOLUTION INTRAMUSCULAR; INTRAVENOUS ONCE
Refills: 0 | Status: COMPLETED | OUTPATIENT
Start: 2023-12-23 | End: 2023-12-23

## 2023-12-23 RX ORDER — ACETAMINOPHEN 500 MG
1000 TABLET ORAL ONCE
Refills: 0 | Status: COMPLETED | OUTPATIENT
Start: 2023-12-23 | End: 2023-12-23

## 2023-12-23 RX ADMIN — Medication 150 GRAM(S): at 12:31

## 2023-12-23 RX ADMIN — SODIUM CHLORIDE 2000 MILLILITER(S): 9 INJECTION INTRAMUSCULAR; INTRAVENOUS; SUBCUTANEOUS at 12:11

## 2023-12-23 RX ADMIN — CEFTRIAXONE 1000 MILLIGRAM(S): 500 INJECTION, POWDER, FOR SOLUTION INTRAMUSCULAR; INTRAVENOUS at 15:23

## 2023-12-23 RX ADMIN — Medication 400 MILLIGRAM(S): at 12:12

## 2023-12-23 RX ADMIN — Medication 3 MILLILITER(S): at 12:20

## 2023-12-23 RX ADMIN — Medication 2 GRAM(S): at 13:00

## 2023-12-23 RX ADMIN — CEFTRIAXONE 100 MILLIGRAM(S): 500 INJECTION, POWDER, FOR SOLUTION INTRAMUSCULAR; INTRAVENOUS at 14:40

## 2023-12-23 RX ADMIN — Medication 1000 MILLIGRAM(S): at 12:30

## 2023-12-23 RX ADMIN — Medication 3 MILLILITER(S): at 13:00

## 2023-12-23 RX ADMIN — Medication 75 MILLIGRAM(S): at 14:40

## 2023-12-23 RX ADMIN — SODIUM CHLORIDE 2000 MILLILITER(S): 9 INJECTION INTRAMUSCULAR; INTRAVENOUS; SUBCUTANEOUS at 14:59

## 2023-12-23 RX ADMIN — Medication 3 MILLILITER(S): at 12:31

## 2023-12-23 RX ADMIN — Medication 125 MILLIGRAM(S): at 12:30

## 2023-12-23 RX ADMIN — Medication 10 MILLIGRAM(S): at 12:12

## 2023-12-23 NOTE — ED ADULT TRIAGE NOTE - HEIGHT IN INCHES
Advised Dr. Gab Milian we do not carry PO hydroxyzine at this facility     Sonali Daigle RN  10/08/20 5789
Bed: 10  Expected date:   Expected time:   Means of arrival:   Comments:     Princella Dance, RN  10/08/20 7375
Nurse to nurse report called, spoke with MARKO Daigle RN  10/08/20 7513
[facility] called ETA 45 mins      [facility]  10/08/20 1879
3

## 2023-12-23 NOTE — ED PROVIDER NOTE - CLINICAL SUMMARY MEDICAL DECISION MAKING FREE TEXT BOX
33 year old female with h/o asthma and sinusitis presents today c/o flu like symptoms since yesterday, +fevers +fatigue +cough +generalized weakness, pt denies recent travel or sick contacts, on exam pt appears generally weak, coughing intermittently, DDX includes but not limited to viral vs bacterial infection, asthma exacerbation, will obtain labs, hydrate, medicate for pain prn, reassess and dispo,    labs reviewed, pt is flu positive, cxr shows a slight left perihilar infiltrate  pt given a dose of iv antibiotics, started on tamiflu   follow up with pmd   return for worsening symptoms- sob worsening, unable to eat or drink, persistent fevers despite antibiotics

## 2023-12-23 NOTE — ED PROVIDER NOTE - PATIENT PORTAL LINK FT
You can access the FollowMyHealth Patient Portal offered by [facility] by registering at the following website: [email or web address]. By joining [employer]’s FollowMyHealth portal, you will also be able to view your health information using other applications (apps) compatible with our system. You can access the FollowMyHealth Patient Portal offered by [facility] by registering at the following website: [email or web address]. By joining [employer]’s FollowMyHealth portal, you will also be able to view your health information using other applications (apps) compatible with our system.

## 2023-12-23 NOTE — ED ADULT NURSE NOTE - NSFALLUNIVINTERV_ED_ALL_ED
Bed/Stretcher in lowest position, wheels locked, appropriate side rails in place/Call bell, personal items and telephone in reach/Instruct patient to call for assistance before getting out of bed/chair/stretcher/Non-slip footwear applied when patient is off stretcher/[place] to call system/Physically safe environment - no spills, clutter or unnecessary equipment/Purposeful proactive rounding/Room/bathroom lighting operational, light cord in reach Bed/Stretcher in lowest position, wheels locked, appropriate side rails in place/Call bell, personal items and telephone in reach/Instruct patient to call for assistance before getting out of bed/chair/stretcher/Non-slip footwear applied when patient is off stretcher/[place] to call system/Physically safe environment - no spills, clutter or unnecessary equipment/Purposeful proactive rounding/Room/bathroom lighting operational, light cord in reach

## 2023-12-24 RX ORDER — AZITHROMYCIN 500 MG/1
1 TABLET, FILM COATED ORAL
Qty: 5 | Refills: 0
Start: 2023-12-24 | End: 2023-12-28

## 2023-12-24 RX ORDER — AZITHROMYCIN 500 MG/1
0 TABLET, FILM COATED ORAL
Refills: 0
Start: 2023-12-24

## 2023-12-24 RX ORDER — AZITHROMYCIN 500 MG/1
1 TABLET, FILM COATED ORAL
Refills: 0
Start: 2023-12-24

## 2023-12-25 LAB
CULTURE RESULTS: SIGNIFICANT CHANGE UP
CULTURE RESULTS: SIGNIFICANT CHANGE UP
SPECIMEN SOURCE: SIGNIFICANT CHANGE UP
SPECIMEN SOURCE: SIGNIFICANT CHANGE UP

## 2023-12-28 LAB
CULTURE RESULTS: SIGNIFICANT CHANGE UP
SPECIMEN SOURCE: SIGNIFICANT CHANGE UP

## 2024-02-25 ENCOUNTER — NON-APPOINTMENT (OUTPATIENT)
Age: 34
End: 2024-02-25

## 2024-03-15 ENCOUNTER — APPOINTMENT (OUTPATIENT)
Dept: FAMILY MEDICINE | Facility: CLINIC | Age: 34
End: 2024-03-15
Payer: COMMERCIAL

## 2024-03-15 VITALS
OXYGEN SATURATION: 98 % | TEMPERATURE: 97.6 F | WEIGHT: 247 LBS | HEIGHT: 63 IN | BODY MASS INDEX: 43.77 KG/M2 | HEART RATE: 87 BPM | RESPIRATION RATE: 16 BRPM | SYSTOLIC BLOOD PRESSURE: 122 MMHG | DIASTOLIC BLOOD PRESSURE: 82 MMHG

## 2024-03-15 DIAGNOSIS — B37.9 CANDIDIASIS, UNSPECIFIED: ICD-10-CM

## 2024-03-15 DIAGNOSIS — J45.909 UNSPECIFIED ASTHMA, UNCOMPLICATED: ICD-10-CM

## 2024-03-15 DIAGNOSIS — E66.01 MORBID (SEVERE) OBESITY DUE TO EXCESS CALORIES: ICD-10-CM

## 2024-03-15 DIAGNOSIS — Z11.59 ENCOUNTER FOR SCREENING FOR OTHER VIRAL DISEASES: ICD-10-CM

## 2024-03-15 DIAGNOSIS — M25.551 PAIN IN RIGHT HIP: ICD-10-CM

## 2024-03-15 DIAGNOSIS — R10.9 UNSPECIFIED ABDOMINAL PAIN: ICD-10-CM

## 2024-03-15 DIAGNOSIS — J30.9 ALLERGIC RHINITIS, UNSPECIFIED: ICD-10-CM

## 2024-03-15 DIAGNOSIS — Z87.898 PERSONAL HISTORY OF OTHER SPECIFIED CONDITIONS: ICD-10-CM

## 2024-03-15 DIAGNOSIS — N89.8 OTHER SPECIFIED NONINFLAMMATORY DISORDERS OF VAGINA: ICD-10-CM

## 2024-03-15 DIAGNOSIS — Z3A.10 10 WEEKS GESTATION OF PREGNANCY: ICD-10-CM

## 2024-03-15 DIAGNOSIS — L30.9 DERMATITIS, UNSPECIFIED: ICD-10-CM

## 2024-03-15 DIAGNOSIS — R10.2 PELVIC AND PERINEAL PAIN: ICD-10-CM

## 2024-03-15 DIAGNOSIS — Z01.818 ENCOUNTER FOR OTHER PREPROCEDURAL EXAMINATION: ICD-10-CM

## 2024-03-15 DIAGNOSIS — J32.9 CHRONIC SINUSITIS, UNSPECIFIED: ICD-10-CM

## 2024-03-15 DIAGNOSIS — G47.30 SLEEP APNEA, UNSPECIFIED: ICD-10-CM

## 2024-03-15 DIAGNOSIS — B34.2 CORONAVIRUS INFECTION, UNSPECIFIED: ICD-10-CM

## 2024-03-15 DIAGNOSIS — Z00.00 ENCOUNTER FOR GENERAL ADULT MEDICAL EXAMINATION W/OUT ABNORMAL FINDINGS: ICD-10-CM

## 2024-03-15 DIAGNOSIS — Z87.42 PERSONAL HISTORY OF OTHER DISEASES OF THE FEMALE GENITAL TRACT: ICD-10-CM

## 2024-03-15 DIAGNOSIS — R19.8 OTHER SPECIFIED SYMPTOMS AND SIGNS INVOLVING THE DIGESTIVE SYSTEM AND ABDOMEN: ICD-10-CM

## 2024-03-15 DIAGNOSIS — O47.9 FALSE LABOR, UNSPECIFIED: ICD-10-CM

## 2024-03-15 DIAGNOSIS — E55.9 VITAMIN D DEFICIENCY, UNSPECIFIED: ICD-10-CM

## 2024-03-15 PROCEDURE — 99395 PREV VISIT EST AGE 18-39: CPT

## 2024-03-15 RX ORDER — AMOXICILLIN 875 MG/1
875 TABLET, FILM COATED ORAL
Qty: 14 | Refills: 0 | Status: ACTIVE | COMMUNITY
Start: 2024-03-15 | End: 1900-01-01

## 2024-03-15 RX ORDER — MONTELUKAST 10 MG/1
10 TABLET, FILM COATED ORAL
Qty: 1 | Refills: 1 | Status: ACTIVE | COMMUNITY
Start: 2023-02-23 | End: 1900-01-01

## 2024-03-15 RX ORDER — ALBUTEROL SULFATE 2.5 MG/3ML
(2.5 MG/3ML) SOLUTION RESPIRATORY (INHALATION)
Qty: 1 | Refills: 0 | Status: ACTIVE | COMMUNITY
Start: 2024-03-15 | End: 1900-01-01

## 2024-03-15 RX ORDER — CETIRIZINE HYDROCHLORIDE 10 MG/1
10 TABLET, COATED ORAL DAILY
Qty: 90 | Refills: 1 | Status: ACTIVE | COMMUNITY
Start: 2024-03-15 | End: 1900-01-01

## 2024-03-15 RX ORDER — SODIUM PICOSULFATE, MAGNESIUM OXIDE, AND ANHYDROUS CITRIC ACID 10; 3.5; 12 MG/160ML; G/160ML; G/160ML
10-3.5-12 MG-GM LIQUID ORAL TWICE DAILY
Qty: 1 | Refills: 0 | Status: DISCONTINUED | COMMUNITY
Start: 2023-03-09 | End: 2024-03-15

## 2024-03-15 RX ORDER — ALBUTEROL SULFATE 90 UG/1
108 (90 BASE) INHALANT RESPIRATORY (INHALATION)
Qty: 1 | Refills: 3 | Status: ACTIVE | COMMUNITY
Start: 2022-04-06 | End: 1900-01-01

## 2024-03-15 RX ORDER — AZELASTINE HYDROCHLORIDE 137 UG/1
137 SPRAY, METERED NASAL
Qty: 1 | Refills: 1 | Status: ACTIVE | COMMUNITY
Start: 2024-03-15 | End: 1900-01-01

## 2024-03-15 RX ORDER — NEBULIZER ACCESSORIES
KIT MISCELLANEOUS
Qty: 1 | Refills: 0 | Status: ACTIVE | COMMUNITY
Start: 2024-03-15 | End: 1900-01-01

## 2024-03-15 NOTE — HISTORY OF PRESENT ILLNESS
[FreeTextEntry1] : 33 year old female who presents today for a complete physical exam. 10 week pregnant c/o sinus congestion and a lot of mucous x 4 wks, sometimes white or green c/o eczema following w/ OB, on prenatal; hx of preeclampsia shows me pictures of recent bw done on 2/21/2024 including cbc, cmp, tsh, a1c, urine, all nml; vit d low

## 2024-03-15 NOTE — HEALTH RISK ASSESSMENT
[Good] : ~his/her~ current health as good [No] : No [# of Members in Household ___] :  household currently consist of [unfilled] member(s) [Employed] : employed [Significant Other] : lives with significant other [# Of Children ___] : has [unfilled] children [Fully functional (bathing, dressing, toileting, transferring, walking, feeding)] : Fully functional (bathing, dressing, toileting, transferring, walking, feeding) [Sexually Active] : sexually active [Fully functional (using the telephone, shopping, preparing meals, housekeeping, doing laundry, using] : Fully functional and needs no help or supervision to perform IADLs (using the telephone, shopping, preparing meals, housekeeping, doing laundry, using transportation, managing medications and managing finances) [Never] : Never [Change in mental status noted] : No change in mental status noted [Language] : denies difficulty with language [Handling Complex Tasks] : denies difficulty handling complex tasks [Reports changes in hearing] : Reports no changes in hearing [Reports changes in vision] : Reports no changes in vision [Reports changes in dental health] : Reports no changes in dental health [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

## 2024-03-15 NOTE — PLAN
[FreeTextEntry1] : astelin/zyrtec/amoxicillin refilled albuterol/montelukast chk resp panel swab cont OB f/u and prenatal healthy eating encouraged

## 2024-03-15 NOTE — PHYSICAL EXAM
[No Acute Distress] : no acute distress [Well-Appearing] : well-appearing [Normal Sclera/Conjunctiva] : normal sclera/conjunctiva [PERRL] : pupils equal round and reactive to light [Normal Outer Ear/Nose] : the outer ears and nose were normal in appearance [EOMI] : extraocular movements intact [Normal Oropharynx] : the oropharynx was normal [No JVD] : no jugular venous distention [No Lymphadenopathy] : no lymphadenopathy [Supple] : supple [Thyroid Normal, No Nodules] : the thyroid was normal and there were no nodules present [No Accessory Muscle Use] : no accessory muscle use [No Respiratory Distress] : no respiratory distress  [Clear to Auscultation] : lungs were clear to auscultation bilaterally [Normal Rate] : normal rate  [Regular Rhythm] : with a regular rhythm [No Murmur] : no murmur heard [Normal S1, S2] : normal S1 and S2 [No Abdominal Bruit] : a ~M bruit was not heard ~T in the abdomen [Pedal Pulses Present] : the pedal pulses are present [No Varicosities] : no varicosities [No Palpable Aorta] : no palpable aorta [No Edema] : there was no peripheral edema [No Extremity Clubbing/Cyanosis] : no extremity clubbing/cyanosis [Soft] : abdomen soft [Non Tender] : non-tender [Non-distended] : non-distended [Normal Bowel Sounds] : normal bowel sounds [No HSM] : no HSM [No Masses] : no abdominal mass palpated [Normal Posterior Cervical Nodes] : no posterior cervical lymphadenopathy [Normal Supraclavicular Nodes] : no supraclavicular lymphadenopathy [No CVA Tenderness] : no CVA  tenderness [Normal Anterior Cervical Nodes] : no anterior cervical lymphadenopathy [No Spinal Tenderness] : no spinal tenderness [No Joint Swelling] : no joint swelling [Grossly Normal Strength/Tone] : grossly normal strength/tone [No Rash] : no rash [No Focal Deficits] : no focal deficits [Coordination Grossly Intact] : coordination grossly intact [Normal Gait] : normal gait [Normal Affect] : the affect was normal [Alert and Oriented x3] : oriented to person, place, and time [Normal Insight/Judgement] : insight and judgment were intact [de-identified] : obese

## 2024-03-16 LAB
APPEARANCE: CLEAR
BILIRUBIN URINE: NEGATIVE
BLOOD URINE: NEGATIVE
COLOR: YELLOW
GLUCOSE QUALITATIVE U: NEGATIVE MG/DL
KETONES URINE: ABNORMAL MG/DL
LEUKOCYTE ESTERASE URINE: NEGATIVE
NITRITE URINE: NEGATIVE
PH URINE: 6
PROTEIN URINE: NORMAL MG/DL
RAPID RVP RESULT: NOT DETECTED
SARS-COV-2 RNA PNL RESP NAA+PROBE: NOT DETECTED
SPECIFIC GRAVITY URINE: >1.03
UROBILINOGEN URINE: 0.2 MG/DL

## 2024-04-08 ENCOUNTER — LABORATORY RESULT (OUTPATIENT)
Age: 34
End: 2024-04-08

## 2024-04-08 ENCOUNTER — APPOINTMENT (OUTPATIENT)
Dept: ANTEPARTUM | Facility: CLINIC | Age: 34
End: 2024-04-08
Payer: COMMERCIAL

## 2024-04-08 ENCOUNTER — ASOB RESULT (OUTPATIENT)
Age: 34
End: 2024-04-08

## 2024-04-08 PROCEDURE — 76813 OB US NUCHAL MEAS 1 GEST: CPT

## 2024-04-08 PROCEDURE — 76801 OB US < 14 WKS SINGLE FETUS: CPT | Mod: 59

## 2024-04-08 PROCEDURE — 36415 COLL VENOUS BLD VENIPUNCTURE: CPT

## 2024-04-10 ENCOUNTER — EMERGENCY (EMERGENCY)
Facility: HOSPITAL | Age: 34
LOS: 1 days | Discharge: HOME | End: 2024-04-10
Attending: STUDENT IN AN ORGANIZED HEALTH CARE EDUCATION/TRAINING PROGRAM
Payer: COMMERCIAL

## 2024-04-10 VITALS
HEIGHT: 63 IN | SYSTOLIC BLOOD PRESSURE: 122 MMHG | OXYGEN SATURATION: 97 % | RESPIRATION RATE: 18 BRPM | DIASTOLIC BLOOD PRESSURE: 82 MMHG | HEART RATE: 103 BPM | WEIGHT: 251.11 LBS | TEMPERATURE: 98 F

## 2024-04-10 VITALS
OXYGEN SATURATION: 96 % | TEMPERATURE: 98 F | RESPIRATION RATE: 18 BRPM | SYSTOLIC BLOOD PRESSURE: 100 MMHG | HEART RATE: 88 BPM | DIASTOLIC BLOOD PRESSURE: 72 MMHG

## 2024-04-10 LAB
ADD ON TEST-SPECIMEN IN LAB: SIGNIFICANT CHANGE UP
ADD ON TEST-SPECIMEN IN LAB: SIGNIFICANT CHANGE UP
ALBUMIN SERPL ELPH-MCNC: 3.5 G/DL — SIGNIFICANT CHANGE UP (ref 3.3–5)
ALBUMIN SERPL ELPH-MCNC: 3.7 G/DL — SIGNIFICANT CHANGE UP (ref 3.3–5)
ALP SERPL-CCNC: 141 U/L — HIGH (ref 40–120)
ALP SERPL-CCNC: 156 U/L — HIGH (ref 40–120)
ALT FLD-CCNC: 139 U/L — HIGH (ref 10–45)
ALT FLD-CCNC: 155 U/L — HIGH (ref 10–45)
ANION GAP SERPL CALC-SCNC: 10 MMOL/L — SIGNIFICANT CHANGE UP (ref 5–17)
ANION GAP SERPL CALC-SCNC: 13 MMOL/L — SIGNIFICANT CHANGE UP (ref 5–17)
APPEARANCE UR: ABNORMAL
APPEARANCE UR: CLEAR — SIGNIFICANT CHANGE UP
AST SERPL-CCNC: 63 U/L — HIGH (ref 10–40)
AST SERPL-CCNC: 76 U/L — HIGH (ref 10–40)
BACTERIA # UR AUTO: ABNORMAL /HPF
BASOPHILS # BLD AUTO: 0.02 K/UL — SIGNIFICANT CHANGE UP (ref 0–0.2)
BASOPHILS NFR BLD AUTO: 0.2 % — SIGNIFICANT CHANGE UP (ref 0–2)
BILIRUB SERPL-MCNC: 0.3 MG/DL — SIGNIFICANT CHANGE UP (ref 0.2–1.2)
BILIRUB SERPL-MCNC: 0.3 MG/DL — SIGNIFICANT CHANGE UP (ref 0.2–1.2)
BILIRUB UR-MCNC: ABNORMAL
BILIRUB UR-MCNC: NEGATIVE — SIGNIFICANT CHANGE UP
BUN SERPL-MCNC: 6 MG/DL — LOW (ref 7–23)
BUN SERPL-MCNC: 6 MG/DL — LOW (ref 7–23)
CALCIUM SERPL-MCNC: 9 MG/DL — SIGNIFICANT CHANGE UP (ref 8.4–10.5)
CALCIUM SERPL-MCNC: 9 MG/DL — SIGNIFICANT CHANGE UP (ref 8.4–10.5)
CAST: 2 /LPF — SIGNIFICANT CHANGE UP (ref 0–4)
CHLORIDE SERPL-SCNC: 106 MMOL/L — SIGNIFICANT CHANGE UP (ref 96–108)
CHLORIDE SERPL-SCNC: 108 MMOL/L — SIGNIFICANT CHANGE UP (ref 96–108)
CO2 SERPL-SCNC: 19 MMOL/L — LOW (ref 22–31)
CO2 SERPL-SCNC: 20 MMOL/L — LOW (ref 22–31)
COLOR SPEC: SIGNIFICANT CHANGE UP
COLOR SPEC: YELLOW — SIGNIFICANT CHANGE UP
CREAT SERPL-MCNC: 0.6 MG/DL — SIGNIFICANT CHANGE UP (ref 0.5–1.3)
CREAT SERPL-MCNC: 0.71 MG/DL — SIGNIFICANT CHANGE UP (ref 0.5–1.3)
DIFF PNL FLD: NEGATIVE — SIGNIFICANT CHANGE UP
DIFF PNL FLD: NEGATIVE — SIGNIFICANT CHANGE UP
EGFR: 113 ML/MIN/1.73M2 — SIGNIFICANT CHANGE UP
EGFR: 119 ML/MIN/1.73M2 — SIGNIFICANT CHANGE UP
EOSINOPHIL # BLD AUTO: 0.14 K/UL — SIGNIFICANT CHANGE UP (ref 0–0.5)
EOSINOPHIL NFR BLD AUTO: 1.7 % — SIGNIFICANT CHANGE UP (ref 0–6)
GLUCOSE SERPL-MCNC: 103 MG/DL — HIGH (ref 70–99)
GLUCOSE SERPL-MCNC: 103 MG/DL — HIGH (ref 70–99)
GLUCOSE UR QL: NEGATIVE MG/DL — SIGNIFICANT CHANGE UP
GLUCOSE UR QL: NEGATIVE MG/DL — SIGNIFICANT CHANGE UP
HCT VFR BLD CALC: 36.8 % — SIGNIFICANT CHANGE UP (ref 34.5–45)
HGB BLD-MCNC: 12.8 G/DL — SIGNIFICANT CHANGE UP (ref 11.5–15.5)
IMM GRANULOCYTES NFR BLD AUTO: 0.7 % — SIGNIFICANT CHANGE UP (ref 0–0.9)
KETONES UR-MCNC: 40 MG/DL
KETONES UR-MCNC: NEGATIVE MG/DL — SIGNIFICANT CHANGE UP
LEUKOCYTE ESTERASE UR-ACNC: ABNORMAL
LEUKOCYTE ESTERASE UR-ACNC: NEGATIVE — SIGNIFICANT CHANGE UP
LYMPHOCYTES # BLD AUTO: 1.85 K/UL — SIGNIFICANT CHANGE UP (ref 1–3.3)
LYMPHOCYTES # BLD AUTO: 23.1 % — SIGNIFICANT CHANGE UP (ref 13–44)
MCHC RBC-ENTMCNC: 30 PG — SIGNIFICANT CHANGE UP (ref 27–34)
MCHC RBC-ENTMCNC: 34.8 GM/DL — SIGNIFICANT CHANGE UP (ref 32–36)
MCV RBC AUTO: 86.4 FL — SIGNIFICANT CHANGE UP (ref 80–100)
MONOCYTES # BLD AUTO: 0.64 K/UL — SIGNIFICANT CHANGE UP (ref 0–0.9)
MONOCYTES NFR BLD AUTO: 8 % — SIGNIFICANT CHANGE UP (ref 2–14)
NEUTROPHILS # BLD AUTO: 5.31 K/UL — SIGNIFICANT CHANGE UP (ref 1.8–7.4)
NEUTROPHILS NFR BLD AUTO: 66.3 % — SIGNIFICANT CHANGE UP (ref 43–77)
NITRITE UR-MCNC: NEGATIVE — SIGNIFICANT CHANGE UP
NITRITE UR-MCNC: NEGATIVE — SIGNIFICANT CHANGE UP
NRBC # BLD: 0 /100 WBCS — SIGNIFICANT CHANGE UP (ref 0–0)
PH UR: 5.5 — SIGNIFICANT CHANGE UP (ref 5–8)
PH UR: 8 — SIGNIFICANT CHANGE UP (ref 5–8)
PLATELET # BLD AUTO: 250 K/UL — SIGNIFICANT CHANGE UP (ref 150–400)
POTASSIUM SERPL-MCNC: 3.4 MMOL/L — LOW (ref 3.5–5.3)
POTASSIUM SERPL-MCNC: 4.1 MMOL/L — SIGNIFICANT CHANGE UP (ref 3.5–5.3)
POTASSIUM SERPL-SCNC: 3.4 MMOL/L — LOW (ref 3.5–5.3)
POTASSIUM SERPL-SCNC: 4.1 MMOL/L — SIGNIFICANT CHANGE UP (ref 3.5–5.3)
PROT SERPL-MCNC: 6.4 G/DL — SIGNIFICANT CHANGE UP (ref 6–8.3)
PROT SERPL-MCNC: 6.8 G/DL — SIGNIFICANT CHANGE UP (ref 6–8.3)
PROT UR-MCNC: 30 MG/DL
PROT UR-MCNC: NEGATIVE MG/DL — SIGNIFICANT CHANGE UP
RBC # BLD: 4.26 M/UL — SIGNIFICANT CHANGE UP (ref 3.8–5.2)
RBC # FLD: 12.3 % — SIGNIFICANT CHANGE UP (ref 10.3–14.5)
RBC CASTS # UR COMP ASSIST: 4 /HPF — SIGNIFICANT CHANGE UP (ref 0–4)
SODIUM SERPL-SCNC: 138 MMOL/L — SIGNIFICANT CHANGE UP (ref 135–145)
SODIUM SERPL-SCNC: 138 MMOL/L — SIGNIFICANT CHANGE UP (ref 135–145)
SP GR SPEC: 1.02 — SIGNIFICANT CHANGE UP (ref 1–1.03)
SP GR SPEC: 1.03 — SIGNIFICANT CHANGE UP (ref 1–1.03)
SQUAMOUS # UR AUTO: 14 /HPF — HIGH (ref 0–5)
UROBILINOGEN FLD QL: 1 MG/DL — SIGNIFICANT CHANGE UP (ref 0.2–1)
UROBILINOGEN FLD QL: 1 MG/DL — SIGNIFICANT CHANGE UP (ref 0.2–1)
WBC # BLD: 8.02 K/UL — SIGNIFICANT CHANGE UP (ref 3.8–10.5)
WBC # FLD AUTO: 8.02 K/UL — SIGNIFICANT CHANGE UP (ref 3.8–10.5)
WBC UR QL: 3 /HPF — SIGNIFICANT CHANGE UP (ref 0–5)

## 2024-04-10 PROCEDURE — 85025 COMPLETE CBC W/AUTO DIFF WBC: CPT

## 2024-04-10 PROCEDURE — 93005 ELECTROCARDIOGRAM TRACING: CPT

## 2024-04-10 PROCEDURE — 99285 EMERGENCY DEPT VISIT HI MDM: CPT | Mod: 25

## 2024-04-10 PROCEDURE — 76700 US EXAM ABDOM COMPLETE: CPT

## 2024-04-10 PROCEDURE — 99236 HOSP IP/OBS SAME DATE HI 85: CPT

## 2024-04-10 PROCEDURE — 96361 HYDRATE IV INFUSION ADD-ON: CPT

## 2024-04-10 PROCEDURE — 80053 COMPREHEN METABOLIC PANEL: CPT

## 2024-04-10 PROCEDURE — G0378: CPT

## 2024-04-10 PROCEDURE — 87086 URINE CULTURE/COLONY COUNT: CPT

## 2024-04-10 PROCEDURE — 83690 ASSAY OF LIPASE: CPT

## 2024-04-10 PROCEDURE — 96374 THER/PROPH/DIAG INJ IV PUSH: CPT

## 2024-04-10 PROCEDURE — 76700 US EXAM ABDOM COMPLETE: CPT | Mod: 26

## 2024-04-10 PROCEDURE — 81003 URINALYSIS AUTO W/O SCOPE: CPT

## 2024-04-10 PROCEDURE — 36415 COLL VENOUS BLD VENIPUNCTURE: CPT

## 2024-04-10 PROCEDURE — 81001 URINALYSIS AUTO W/SCOPE: CPT

## 2024-04-10 RX ORDER — FAMOTIDINE 10 MG/ML
20 INJECTION INTRAVENOUS DAILY
Refills: 0 | Status: DISCONTINUED | OUTPATIENT
Start: 2024-04-10 | End: 2024-04-13

## 2024-04-10 RX ORDER — ONDANSETRON 8 MG/1
1 TABLET, FILM COATED ORAL
Qty: 5 | Refills: 0
Start: 2024-04-10 | End: 2024-04-11

## 2024-04-10 RX ORDER — ONDANSETRON 8 MG/1
4 TABLET, FILM COATED ORAL ONCE
Refills: 0 | Status: COMPLETED | OUTPATIENT
Start: 2024-04-10 | End: 2024-04-10

## 2024-04-10 RX ORDER — SODIUM CHLORIDE 9 MG/ML
1000 INJECTION, SOLUTION INTRAVENOUS
Refills: 0 | Status: DISCONTINUED | OUTPATIENT
Start: 2024-04-10 | End: 2024-04-13

## 2024-04-10 RX ORDER — SODIUM CHLORIDE 9 MG/ML
1000 INJECTION, SOLUTION INTRAVENOUS
Refills: 0 | Status: DISCONTINUED | OUTPATIENT
Start: 2024-04-10 | End: 2024-04-10

## 2024-04-10 RX ORDER — SODIUM CHLORIDE 9 MG/ML
1000 INJECTION, SOLUTION INTRAVENOUS
Refills: 0 | Status: COMPLETED | OUTPATIENT
Start: 2024-04-10 | End: 2024-04-10

## 2024-04-10 RX ORDER — POTASSIUM CHLORIDE 20 MEQ
40 PACKET (EA) ORAL ONCE
Refills: 0 | Status: COMPLETED | OUTPATIENT
Start: 2024-04-10 | End: 2024-04-10

## 2024-04-10 RX ADMIN — ONDANSETRON 4 MILLIGRAM(S): 8 TABLET, FILM COATED ORAL at 06:15

## 2024-04-10 RX ADMIN — SODIUM CHLORIDE 1000 MILLILITER(S): 9 INJECTION, SOLUTION INTRAVENOUS at 06:15

## 2024-04-10 RX ADMIN — Medication 40 MILLIEQUIVALENT(S): at 06:55

## 2024-04-10 RX ADMIN — SODIUM CHLORIDE 1000 MILLILITER(S): 9 INJECTION, SOLUTION INTRAVENOUS at 07:27

## 2024-04-10 RX ADMIN — FAMOTIDINE 20 MILLIGRAM(S): 10 INJECTION INTRAVENOUS at 14:29

## 2024-04-10 RX ADMIN — SODIUM CHLORIDE 100 MILLILITER(S): 9 INJECTION, SOLUTION INTRAVENOUS at 15:45

## 2024-04-10 RX ADMIN — SODIUM CHLORIDE 1000 MILLILITER(S): 9 INJECTION, SOLUTION INTRAVENOUS at 07:26

## 2024-04-10 NOTE — ED CDU PROVIDER INITIAL DAY NOTE - PROGRESS NOTE DETAILS
Patient reports overall improving symptoms since coming to the ER. Able to tolerate PO.   Abdomen soft, with mild epigastric tenderness. UA repeated without bacteria or leuks. LFTs downtrending. Vitals stable. No acute distress. Shared decision making about discharge home tonight vs. staying until the morning, with patient choosing discharge home tonight. Will send Zofran and have followup with PCP and GYN. Discussed with Dr. Bonilla, patient cleared for discharge home. All questions answered. - Elizabeth Salcido PA-C

## 2024-04-10 NOTE — ED CDU PROVIDER INITIAL DAY NOTE - ATTENDING APP SHARED VISIT CONTRIBUTION OF CARE
I, Asa Lucas, performed a history and physical exam of the patient and discussed their management with the resident and/or advanced care provider. I reviewed the resident and/or advanced care provider's note and agree with the documented findings and plan of care. I was present and available for all procedures.    34-year-old female G3, P1 at 13 weeks gestation presented to the emergency department for 3 days of persistent nausea, vomiting, left upper abdominal pain and intermittent diarrhea.  Patient reports saw her obstetrics team on Monday for anatomy scan, confirmed IUP on outpatient ultrasound.  Reports that for the last 2 days has been unable to tolerate p.o. intake.  Denies fever/chills, shortness of breath, chest pain.  Also reporting some mild dysuria and hematuria.  No flank or back pain.  Reports no changes to bowel movements.  Denies vaginal bleeding, cramping or leakage of fluids.  First pregnancy was  vaginal delivery for preeclampsia with severe features.  Second pregnancy miscarriage.    Well appearing and in NAD, head normal appearing atraumatic, trachea midline, no respiratory distress, lungs cta bilaterally, rrr no murmurs, soft NT ND abdomen, no visible extremity deformities, Alert and oriented, non focal neuro exam, skin warm and dry, normal affect and mood, no leg swelling, calf ttp or jvd    Patient with abdominal cramping nausea vomiting diarrhea will evaluate with screening labs otherwise IV fluids with dextrose IV for gastroenteritis versus hyperemesis fetal heart rate reassuring on Doppler otherwise disposition pending workup and reevaluation as well as tolerating p.o. discussed with patient agreed with plan unlikely ACS PE pneumothorax dissection AAA pneumonia.

## 2024-04-10 NOTE — ED ADULT NURSE NOTE - NSFALLUNIVINTERV_ED_ALL_ED
Bed/Stretcher in lowest position, wheels locked, appropriate side rails in place/Call bell, personal items and telephone in reach/Instruct patient to call for assistance before getting out of bed/chair/stretcher/Non-slip footwear applied when patient is off stretcher/[place] to call system/Physically safe environment - no spills, clutter or unnecessary equipment/Purposeful proactive rounding/Room/bathroom lighting operational, light cord in reach

## 2024-04-10 NOTE — ED PROVIDER NOTE - CLINICAL SUMMARY MEDICAL DECISION MAKING FREE TEXT BOX
34-year-old female G3, P1 at 13 weeks gestation presented to the emergency department for 3 days of persistent nausea, vomiting, left upper abdominal pain and intermittent diarrhea. Differential diagnosis includes but is not limited to physiologic nausea and vomiting in pregnancy vs viral gastroenteritis. Low suspicion patient is experiencing hyperemesis gravidarum. No vaginal bleeding, cramping or leaking of fluids. Patient reports confirmed IUP. No pressure issues thus far in this pregnancy despite first preg early vag delivery for preeclampsia w/ severe features. Afebrile without systemic signs of infection. Hemodynamically stable. Patient in no acute distress, mildly dry clinically on exam. Abd mild TTP but without peritoneal signs. Will get screening labs, ECG, IVF with D5LR, zofran for nausea. Will do POCUS for FHR. UA/UCx for assessment of bacteruria, albeit currently symptomatic. Anticipate DC home if can tolerate PO intake w/ diclegis Rx and outpt OB f/u. 34-year-old female G3, P1 at 13 weeks gestation presented to the emergency department for 3 days of persistent nausea, vomiting, left upper abdominal pain and intermittent diarrhea. Differential diagnosis includes but is not limited to physiologic nausea and vomiting in pregnancy vs viral gastroenteritis. Low suspicion patient is experiencing hyperemesis gravidarum. No vaginal bleeding, cramping or leaking of fluids. Patient reports confirmed IUP. No pressure issues thus far in this pregnancy despite first preg early vag delivery for preeclampsia w/ severe features. Afebrile without systemic signs of infection. Hemodynamically stable. Patient in no acute distress, mildly dry clinically on exam. Abd mild TTP but without peritoneal signs. Will get screening labs, ECG, IVF with D5LR, zofran for nausea. Will do POCUS for FHR. UA/UCx for assessment of bacteruria, albeit currently symptomatic. Anticipate DC home if can tolerate PO intake w/ diclegis Rx and outpt OB f/u.    pettet attending- see attending attestation for my mdm

## 2024-04-10 NOTE — ED PROVIDER NOTE - PHYSICAL EXAMINATION
GEN: Patient awake and alert. No acute distress, non-toxic. Well appearing.   Head: Normocephalic, atraumatic.  Neck: Nontender, full ROM.   Eyes: PERRLA b/l. EOMI, no scleral icterus, no conjunctival injection. Dry mucous membranes.  CARDIAC: RRR. Normal S1, S2. No murmur, rubs, or gallops. No peripheral edema noted.  PULM: Speaking in full sentences. CTA B/L no wheeze, rales or rhonchi. No signs of respiratory distress, no accessory muscle usage or nasal flaring.  ABD: Soft, mild TTP in LUQ, nondistended. No rebound, no involuntary guarding.  : No CVA tenderness, no suprapubic tenderness.  MSK: Moving all extremities spontaneously. Full ROM in extremities. No obvious deformity.  NEURO: A&Ox3, no focal neurological deficits  SKIN: Warm, dry, no rash, no lesions, no open wounds. No urticaria. No jaundice.

## 2024-04-10 NOTE — ED PROVIDER NOTE - ATTENDING CONTRIBUTION TO CARE
I, Asa Lucas, performed a history and physical exam of the patient and discussed their management with the resident and/or advanced care provider. I reviewed the resident and/or advanced care provider's note and agree with the documented findings and plan of care. I was present and available for all procedures.    34-year-old female G3, P1 at 13 weeks gestation presented to the emergency department for 3 days of persistent nausea, vomiting, left upper abdominal pain and intermittent diarrhea.  Patient reports saw her obstetrics team on Monday for anatomy scan, confirmed IUP on outpatient ultrasound.  Reports that for the last 2 days has been unable to tolerate p.o. intake.  Denies fever/chills, shortness of breath, chest pain.  Also reporting some mild dysuria and hematuria.  No flank or back pain.  Reports no changes to bowel movements.  Denies vaginal bleeding, cramping or leakage of fluids.  First pregnancy was  vaginal delivery for preeclampsia with severe features.  Second pregnancy miscarriage.    Well appearing and in NAD, head normal appearing atraumatic, trachea midline, no respiratory distress, lungs cta bilaterally, rrr no murmurs, soft NT ND abdomen, no visible extremity deformities, Alert and oriented, non focal neuro exam, skin warm and dry, normal affect and mood, no leg swelling, calf ttp or jvd    Patient with abdominal cramping nausea vomiting diarrhea will evaluate with screening labs otherwise IV fluids with dextrose IV for gastroenteritis versus hyperemesis fetal heart rate reassuring on Doppler otherwise disposition pending workup and reevaluation as well as tolerating p.o. discussed with patient agreed with plan unlikely ACS PE pneumothorax dissection AAA pneumonia

## 2024-04-10 NOTE — ED ADULT NURSE REASSESSMENT NOTE - NS ED NURSE REASSESS COMMENT FT1
Pt received from JUAN Nelson at 19:00hrs. Pt A&O x 4. Pt awaiting lab work. Pt denies any chest pain, SOB, dizziness, nausea, vomiting or palpitations. V/S stable, Pt ambulated to BR independently. Safety & comfort measures maintained. Call bell in reach. Will continue to monitor.

## 2024-04-10 NOTE — ED CDU PROVIDER INITIAL DAY NOTE - OBJECTIVE STATEMENT
34-year-old female h/o GERD, EDWARD (cpap at night), Asthma, G3, P1 at 13 weeks gestation presented to the emergency department for 3 days of persistent nausea, vomiting, left upper abdominal pain and intermittent diarrhea.  Patient reports saw her obstetrics team on Monday for anatomy scan, confirmed IUP on outpatient ultrasound.  Reports that for the last 2 days has been unable to tolerate p.o. intake.  Denies fever/chills, shortness of breath, chest pain.  Also reporting some mild dysuria and hematuria.  No flank or back pain.  Reports no changes to bowel movements.  Denies vaginal bleeding, cramping or leakage of fluids.  First pregnancy was  vaginal delivery for preeclampsia with severe features.  Second pregnancy miscarriage.

## 2024-04-10 NOTE — ED PROVIDER NOTE - CHIEF COMPLAINT
The patient is a 34y Female complaining of  The patient is a 34y Female complaining of nausea and vomiting.

## 2024-04-10 NOTE — ED CDU PROVIDER INITIAL DAY NOTE - MUSCULOSKELETAL, MLM
Spine appears normal, moving all extremities. no obvious deformity. Admission Reconciliation is Completed  Discharge Reconciliation is Not Complete Admission Reconciliation is Completed  Discharge Reconciliation is Completed

## 2024-04-10 NOTE — ED ADULT NURSE NOTE - NURSING MUSC ROM

## 2024-04-10 NOTE — ED CDU PROVIDER DISPOSITION NOTE - CLINICAL COURSE
34-year-old female h/o GERD, EDWARD (cpap at night), Asthma, G3, P1 at 13 weeks gestation presented to the emergency department for 3 days of persistent nausea, vomiting, left upper abdominal pain and intermittent diarrhea.  Patient reports saw her obstetrics team on Monday for anatomy scan, confirmed IUP on outpatient ultrasound.  Reports that for the last 2 days has been unable to tolerate p.o. intake.  Denies fever/chills, shortness of breath, chest pain.  Also reporting some mild dysuria and hematuria.  No flank or back pain.  Reports no changes to bowel movements.  Denies vaginal bleeding, cramping or leakage of fluids.  First pregnancy was  vaginal delivery for preeclampsia with severe features.  Second pregnancy miscarriage.  ED course: LFTs elevated, Abdominal US without acute findings. Plan for PO challenge, repeat labs, and hydration in CDU. In CDU, patient able to tolerate PO with improving LFTs. Repeat UA negative for bacteria/Leuks. Discussed with CDU attending Dr. Bonilla, patient cleared for discharge home.

## 2024-04-10 NOTE — ED CDU PROVIDER DISPOSITION NOTE - NSFOLLOWUPINSTRUCTIONS_ED_ALL_ED_FT
Hydrate.     You can take Zofran 4mg every 6 hours as needed for nausea/vomiting.     We recommend you follow up with your primary care provider and your OBGYN tomorrow, please bring all of your results with you.     Please return to the Emergency Department with new, worsening, or concerning symptoms, such as:  -Severe, worsening pain  -Uncontrolled vomiting   -Shortness of breath or trouble breathing  -Pressure, pain, tightness in chest  -Facial drooping, arm weakness, or speech difficulty     *More detailed information regarding your visit and discharge can be found by reviewing this packet

## 2024-04-10 NOTE — ED PROVIDER NOTE - MDM ORDERS SUBMITTED SELECTION
Medications Imaging Studies/Medications Cheilitis Normal Treatment: I recommended application of Vaseline or Aquaphor numerous times a day (as often as every hour) and before going to bed.

## 2024-04-10 NOTE — ED PROVIDER NOTE - PROGRESS NOTE DETAILS
Rajendra Hernandez MD PGY1: Patient reassessed, stable. POCUS performed with FHR at 155 with good fetal movement noted. Rajendra Hernandez MD PGY1: Patient reassessed, stable. CMP demonstrating transaminitis. Will get RUQ to assess for etiology. Normal bilirubin low suspicion for HELLP particularly given current gestational age, no thrombocytopenia or anemia. Differential diagnosis includes but is not limited to early acute fatty liver of pregnancy vs hyperemesis grav etiology vs cholestasis of preg. Low threshold to consult OB pending abd US results. Dispo pending. Patient signed out to me this morning pending ultrasound abdomen.  Ultrasound resulted without acute concerning findings.  Patient overall feeling improved but given elevation of liver function test discussed CDU observation for continued monitoring, p.o. challenge and repeat lab work.  If patient feeling improved later today and likely will discharge home.  If worsening symptoms or LFTs increasing likely will involve GI/GYN services for further recommendations.  Patient agreeable to CDU observation  Cydney Verdugo PA-C

## 2024-04-10 NOTE — ED ADULT NURSE NOTE - OBJECTIVE STATEMENT
35 y/o female presents to the ED endorsing decreased PO intake, nausea and vomiting x3 days. A/Ox4. Ambulatory without assistive devices at baseline. PMH: Asthma, , and Pre-eclampsia (During 1st pregnancy). Patient endorses inability to tolerate foods x3 days including solids and liquids. Patient endorses diarrhea on . Patient endorses chills. Patient endorses following with OB/GYN and takes daily PO Pre- vitamins. Patient denies abdominal pain, hematochezia, chest pain, dyspnea, palpitations, cough and fever. Safety and comfort provided.

## 2024-04-10 NOTE — ED PROVIDER NOTE - OBJECTIVE STATEMENT
34-year-old female G3, P1 at 13 weeks gestation presented to the emergency department for 3 days of persistent nausea, vomiting, left upper abdominal pain and intermittent diarrhea.  Patient reports saw her obstetrics team on Monday for anatomy scan, confirmed IUP on outpatient ultrasound.  Reports that for the last 2 days has been unable to tolerate p.o. intake.  Denies fever/chills, shortness of breath, chest pain.  Also reporting some mild dysuria and hematuria.  No flank or back pain.  Reports no changes to bowel movements.  Denies vaginal bleeding, cramping or leakage of fluids.  First pregnancy was  vaginal delivery for preeclampsia with severe features.  Second pregnancy miscarriage.

## 2024-04-10 NOTE — ED CDU PROVIDER DISPOSITION NOTE - PATIENT PORTAL LINK FT
You can access the FollowMyHealth Patient Portal offered by [facility] by registering at the following website: [email or web address]. By joining [employer]’s FollowMyHealth portal, you will also be able to view your health information using other applications (apps) compatible with our system.

## 2024-04-11 LAB
CULTURE RESULTS: SIGNIFICANT CHANGE UP
SPECIMEN SOURCE: SIGNIFICANT CHANGE UP

## 2024-04-12 LAB
CULTURE RESULTS: SIGNIFICANT CHANGE UP
SPECIMEN SOURCE: SIGNIFICANT CHANGE UP

## 2024-05-07 NOTE — ED ADULT NURSE NOTE - CAS EDP DISCH TYPE
[Fully active, able to carry on all pre-disease performance without restriction] : Status 0 - Fully active, able to carry on all pre-disease performance without restriction [Normal] : affect appropriate Home

## 2024-05-28 ENCOUNTER — APPOINTMENT (OUTPATIENT)
Dept: ANTEPARTUM | Facility: CLINIC | Age: 34
End: 2024-05-28

## 2024-06-03 ENCOUNTER — APPOINTMENT (OUTPATIENT)
Dept: ANTEPARTUM | Facility: CLINIC | Age: 34
End: 2024-06-03
Payer: COMMERCIAL

## 2024-06-03 ENCOUNTER — ASOB RESULT (OUTPATIENT)
Age: 34
End: 2024-06-03

## 2024-06-03 PROCEDURE — 76811 OB US DETAILED SNGL FETUS: CPT

## 2024-07-30 ENCOUNTER — EMERGENCY (EMERGENCY)
Facility: HOSPITAL | Age: 34
LOS: 1 days | Discharge: STILL ADMITTED OR ADMINISTRATIVE | End: 2024-07-30
Admitting: EMERGENCY MEDICINE
Payer: COMMERCIAL

## 2024-07-30 ENCOUNTER — OUTPATIENT (OUTPATIENT)
Dept: INPATIENT UNIT | Facility: HOSPITAL | Age: 34
LOS: 1 days | Discharge: HOME | End: 2024-07-30
Payer: COMMERCIAL

## 2024-07-30 ENCOUNTER — APPOINTMENT (OUTPATIENT)
Dept: ANTEPARTUM | Facility: CLINIC | Age: 34
End: 2024-07-30

## 2024-07-30 VITALS
RESPIRATION RATE: 20 BRPM | DIASTOLIC BLOOD PRESSURE: 71 MMHG | OXYGEN SATURATION: 100 % | HEART RATE: 100 BPM | TEMPERATURE: 98 F | SYSTOLIC BLOOD PRESSURE: 126 MMHG

## 2024-07-30 VITALS — HEART RATE: 103 BPM | SYSTOLIC BLOOD PRESSURE: 118 MMHG | DIASTOLIC BLOOD PRESSURE: 59 MMHG

## 2024-07-30 DIAGNOSIS — O26.899 OTHER SPECIFIED PREGNANCY RELATED CONDITIONS, UNSPECIFIED TRIMESTER: ICD-10-CM

## 2024-07-30 LAB
APPEARANCE UR: ABNORMAL
BACTERIA # UR AUTO: ABNORMAL /HPF
BILIRUB UR-MCNC: NEGATIVE — SIGNIFICANT CHANGE UP
CAST: 1 /LPF — SIGNIFICANT CHANGE UP (ref 0–4)
COD CRY URNS QL: PRESENT
COLOR SPEC: YELLOW — SIGNIFICANT CHANGE UP
CREAT ?TM UR-MCNC: 286 MG/DL — SIGNIFICANT CHANGE UP
DIFF PNL FLD: NEGATIVE — SIGNIFICANT CHANGE UP
GLUCOSE UR QL: NEGATIVE MG/DL — SIGNIFICANT CHANGE UP
HCT VFR BLD CALC: 30.1 % — LOW (ref 34.5–45)
HGB BLD-MCNC: 10.3 G/DL — LOW (ref 11.5–15.5)
IANC: 8.49 K/UL — HIGH (ref 1.8–7.4)
KETONES UR-MCNC: 80 MG/DL
LEUKOCYTE ESTERASE UR-ACNC: NEGATIVE — SIGNIFICANT CHANGE UP
MCHC RBC-ENTMCNC: 30.7 PG — SIGNIFICANT CHANGE UP (ref 27–34)
MCHC RBC-ENTMCNC: 34.2 GM/DL — SIGNIFICANT CHANGE UP (ref 32–36)
MCV RBC AUTO: 89.9 FL — SIGNIFICANT CHANGE UP (ref 80–100)
NITRITE UR-MCNC: NEGATIVE — SIGNIFICANT CHANGE UP
PH UR: 6 — SIGNIFICANT CHANGE UP (ref 5–8)
PLATELET # BLD AUTO: 238 K/UL — SIGNIFICANT CHANGE UP (ref 150–400)
PROT ?TM UR-MCNC: 27 MG/DL — SIGNIFICANT CHANGE UP
PROT UR-MCNC: 30 MG/DL
PROT/CREAT UR-RTO: 0.1 RATIO — SIGNIFICANT CHANGE UP (ref 0–0.2)
RBC # BLD: 3.35 M/UL — LOW (ref 3.8–5.2)
RBC # FLD: 13.1 % — SIGNIFICANT CHANGE UP (ref 10.3–14.5)
RBC CASTS # UR COMP ASSIST: 3 /HPF — SIGNIFICANT CHANGE UP (ref 0–4)
REVIEW: SIGNIFICANT CHANGE UP
SP GR SPEC: 1.03 — HIGH (ref 1–1.03)
SQUAMOUS # UR AUTO: 9 /HPF — HIGH (ref 0–5)
UROBILINOGEN FLD QL: 1 MG/DL — SIGNIFICANT CHANGE UP (ref 0.2–1)
WBC # BLD: 12.67 K/UL — HIGH (ref 3.8–10.5)
WBC # FLD AUTO: 12.67 K/UL — HIGH (ref 3.8–10.5)
WBC UR QL: 8 /HPF — HIGH (ref 0–5)

## 2024-07-30 PROCEDURE — L9996: CPT

## 2024-07-30 PROCEDURE — 99221 1ST HOSP IP/OBS SF/LOW 40: CPT

## 2024-07-30 RX ORDER — BACTERIOSTATIC SODIUM CHLORIDE 0.9 %
3 VIAL (ML) INJECTION EVERY 8 HOURS
Refills: 0 | Status: DISCONTINUED | OUTPATIENT
Start: 2024-07-30 | End: 2024-08-14

## 2024-07-30 RX ORDER — CRANBERRY FRUIT EXTRACT 650 MG
1 CAPSULE ORAL
Refills: 0 | DISCHARGE

## 2024-07-30 NOTE — OB RN TRIAGE NOTE - NSDCRESPRATE_OBGYN_A_OB_NU
MATERNAL FETAL MEDICINE IS FOLLOWING THE PATIENT  FOR   1. Neurological symptoms (MFM)    2. Vaginal bleeding in pregnancy (22weeks (MFM))        SUBJECTIVE:   The patient reports  decreased fetal movement since last visit    CURRENT MEDICATIONS:  Current Outpatient Medications   Medication Sig Dispense Refill   • nitrofurantoin, macrocrystal-monohydrate, (Macrobid) 100 MG capsule Take 1 capsule by mouth 2 times daily. 10 capsule 0   • terconazole (Terazol 7) 0.4 % vaginal cream 1 APPLY TO VAGINA NIGHTLY FOR 7 NIGHTS. 1 each 0   • Nutritional Supplements (DHEA PO)      • Omega-3 Fatty Acids (Fish Oil) 1000 MG capsule Take 2 g by mouth daily.     • cyanocobalamin (Vitamin B-12) 100 MCG tablet Take 50 mcg by mouth daily.     • metoCLOPramide (Reglan) 10 MG tablet Take 1 tablet by mouth 4 times daily as needed for Nausea or Vomiting. 30 tablet 0   • Prenatal Vit-Fe Fumarate-FA (Prenatal Vitamins) 28-0.8 MG Tab Take 1 tablet by mouth daily. 30 tablet    • Probiotic Product (Probiotic Daily) Cap Take 1 capsule by mouth daily. Renew Life       No current facility-administered medications for this visit.       Allergies as of 09/24/2021 - Reviewed 09/22/2021   Allergen Reaction Noted   • Adhesive   (environmental) Other (See Comments) 07/17/2020   • Dander HIVES and Other (See Comments) 04/10/2020         PHYSICAL EXAMINATION:  VITAL SIGNS:  There were no vitals taken for this visit.    LABORATORY RESULTS SINCE LAST VISIT:      TODAY'S ULTRASOUND SHOWED:  Study; fetal follow-up  Indications; neurologic symptoms suggestive of preeclampsia/history of vaginal bleeding  Findings; estimated fetal weight 6 lb 4 oz    Study; biophysical profile  Indications; neurologic symptoms suggestive of preeclampsia/history of vaginal bleeding  Findings; biophysical profile 8/8  ASSESSMENT AND PLAN:  A 35w5d pregnancy being followed by [facility] for   PROBLEM 1.    1. Neurological symptoms (MFM)    2. Vaginal bleeding in pregnancy (22weeks (MFM))     Patient reporting decreased fetal movement so will increase to twice weekly assessment  PLAN:  1. Twice weekly BPP/NST  2. Recommend delivery during the 39th week gestational as clinically indicated prior  3.  Continue to follow-up with Lovely Thompson MD for routine obstetrical care    Total Time spent: 10 minutes on reviewing chart, direct patient care and documentation today.     16

## 2024-07-30 NOTE — OB PROVIDER TRIAGE NOTE - NSOBPROVIDERNOTE_OBGYN_ALL_OB_FT
35 yo , EGA@ 29 weeks, R/O PEC,  labor  UA, HELLP labs  urine culture  PO hydration  0030 Discuss with Dr. Wang  recommend renal US, but patient refused at this time.   return precaution  S/S of infection/PEC reviewed with patient.    stable for discharge.  Discharge patient home.   Labor precautions and fetal movements count were reviewed; if not in labor, will follow up with PMD within 3 days.  All ordered tests results reviewed and interpreted.  Plan of care was reviewed with patient and family; patient states understanding of the above plan.  Pt Discharged home @ 005A    TAPAN beckford NP

## 2024-07-30 NOTE — OB PROVIDER TRIAGE NOTE - NSHPLABSRESULTS_GEN_ALL_CORE
CBC Full  -  ( 2024 22:50 )  WBC Count : 12.67 K/uL  RBC Count : 3.35 M/uL  Hemoglobin : 10.3 g/dL  Hematocrit : 30.1 %  Platelet Count - Automated : 238 K/uL  Mean Cell Volume : 89.9 fL  Mean Cell Hemoglobin : 30.7 pg  Mean Cell Hemoglobin Concentration : 34.2 gm/dL  Auto Neutrophil # : x  Auto Lymphocyte # : x  Auto Monocyte # : x  Auto Eosinophil # : x  Auto Basophil # : x  Auto Neutrophil % : x  Auto Lymphocyte % : x  Auto Monocyte % : x  Auto Eosinophil % : x  Auto Basophil % : x      138  |  106  |  8   ----------------------------<  104<H>  3.5   |  15<L>  |  0.48<L>    Ca    9.6      2024 22:50    TPro  6.5  /  Alb  3.6  /  TBili  <0.2  /  DBili  x   /  AST  14  /  ALT  17  /  AlkPhos  150<H>    Urinalysis Basic - ( 2024 22:50 )    Color: Yellow / Appearance: Cloudy / S.033 / pH: x  Gluc: 104 mg/dL / Ketone: 80 mg/dL  / Bili: Negative / Urobili: 1.0 mg/dL   Blood: x / Protein: 30 mg/dL / Nitrite: Negative   Leuk Esterase: Negative / RBC: 3 /HPF / WBC 8 /HPF   Sq Epi: x / Non Sq Epi: 9 /HPF / Bacteria: Occasional /HPF    PCR 0.1

## 2024-07-30 NOTE — OB PROVIDER TRIAGE NOTE - NS_CHIEFCOMPLAINTOTHER_OBGYN_ALL_OB_FT
right sided back pain started under right breast radiated to the back, diarrhea and  elevated blood pressure @ times

## 2024-07-30 NOTE — OB PROVIDER TRIAGE NOTE - HISTORY OF PRESENT ILLNESS
35 yo , EGA@ 29 weeks, presented to [facility] with c/o right sided back pain that started under the right breast and pelvic cramping that started 2 days ago pain 6/10, pt denies taking any pain medication. Pt report diarrhea x 3 days and elevated blood pressure at times, denies vaginal bleeding, leakage of fluid, and reports decrease fetal movement for the past 2-3 days, but report feeling movement since here in triage.  Denies fever, chills, headaches, changes in vision, chest pain, palpitations, shortness of breath, cough, nausea, vomiting, urinary symptoms or sexual activities in the past 2-3 days.     Prenatal care with [facility]  Prenatal course is uncomplicated.   hX of PEC with 1st pregnancy del @36 weeks.     Meds: PNV  Allergies: seasonal apples (itchy throat), Pepto-Bismol aleve peanuts (anaphylactic)     Social hx: Denies alcohol, tobacco, drug use  Psych hx: denies hx of anxiety/depression; lives with spouse

## 2024-07-30 NOTE — OB PROVIDER TRIAGE NOTE - NSHPPHYSICALEXAM_GEN_ALL_CORE
Vital Signs Last 24 Hrs  T(C): 36.5 (30 Jul 2024 22:20), Max: 36.7 (30 Jul 2024 20:58)  T(F): 97.7 (30 Jul 2024 22:20), Max: 98 (30 Jul 2024 20:58)  HR: 91 (30 Jul 2024 23:44) (85 - 103)  BP: 133/74 (30 Jul 2024 23:44) (118/59 - 133/74)  BP(mean): --  RR: 16 (30 Jul 2024 22:20) (16 - 20)  SpO2: 100% (30 Jul 2024 20:58) (100% - 100%)    Gen: NAD  Head: NC/AT  Cardio: S1S2+, RRR  Resp: CTABL, no wheezing  Abdomen: Soft, NT/ND, BS+  Extremities: No LE edema bilaterally    NST--FHR: 135 HR baseline, moderate variability, accelerations present, no decelerations, Category 1.  [place]: no Contractions present  Saved in ASOB- TAUS: transverse presentation, anterior placenta, MVP5.97cm, M-mode 147bpm, BPP 8/8  SSE: scant amount of leukorrhea, cervix appear close. no bleeding noted, FFN collected and held  Saved in ASOB- TVUS: cervical length 3.1-3.2, no dynamical changes noted. Vital Signs Last 24 Hrs  T(C): 36.5 (30 Jul 2024 22:20), Max: 36.7 (30 Jul 2024 20:58)  T(F): 97.7 (30 Jul 2024 22:20), Max: 98 (30 Jul 2024 20:58)  HR: 91 (30 Jul 2024 23:44) (85 - 103)  BP: 133/74 (30 Jul 2024 23:44) (118/59 - 133/74)  BP(mean): --  RR: 16 (30 Jul 2024 22:20) (16 - 20)  SpO2: 100% (30 Jul 2024 20:58) (100% - 100%)    Gen: NAD  Head: NC/AT  Cardio: S1S2+, RRR  Resp: CTABL, no wheezing  Abdomen: Soft, NT/ND, BS+  Extremities: No LE edema bilaterally  negative CVA tenderness  NST--FHR: 135 HR baseline, moderate variability, accelerations present, no decelerations, Category 1.  [place]: no Contractions present  Saved in ASOB- TAUS: transverse presentation, anterior placenta, MVP5.97cm, M-mode 147bpm, BPP 8/8  SSE: scant amount of leukorrhea, cervix appear close. no bleeding noted, FFN collected and held  Saved in ASOB- TVUS: cervical length 3.1-3.2, with funneling no dynamical changes noted.

## 2024-07-30 NOTE — ED ADULT TRIAGE NOTE - SOURCE OF INFORMATION
Multiple attempts made to gain IV access on pt. DONATO Mata was notified and will attempt ultrasound IV stick.    Patient

## 2024-07-30 NOTE — ED ADULT TRIAGE NOTE - CHIEF COMPLAINT QUOTE
29 weeks, MARLIN 10/14, , WHP- decreased fetal movement x 4 days, umbilical cramping, R sided pain x 2 days

## 2024-07-31 VITALS — DIASTOLIC BLOOD PRESSURE: 57 MMHG | HEART RATE: 94 BPM | SYSTOLIC BLOOD PRESSURE: 108 MMHG

## 2024-07-31 LAB
ALBUMIN SERPL ELPH-MCNC: 3.6 G/DL — SIGNIFICANT CHANGE UP (ref 3.3–5)
ALP SERPL-CCNC: 150 U/L — HIGH (ref 40–120)
ALT FLD-CCNC: 17 U/L — SIGNIFICANT CHANGE UP (ref 4–33)
AMYLASE P1 CFR SERPL: 61 U/L — SIGNIFICANT CHANGE UP (ref 25–125)
ANION GAP SERPL CALC-SCNC: 17 MMOL/L — HIGH (ref 7–14)
ANISOCYTOSIS BLD QL: SLIGHT — SIGNIFICANT CHANGE UP
AST SERPL-CCNC: 14 U/L — SIGNIFICANT CHANGE UP (ref 4–32)
BASOPHILS # BLD AUTO: 0 K/UL — SIGNIFICANT CHANGE UP (ref 0–0.2)
BASOPHILS NFR BLD AUTO: 0 % — SIGNIFICANT CHANGE UP (ref 0–2)
BILIRUB SERPL-MCNC: <0.2 MG/DL — SIGNIFICANT CHANGE UP (ref 0.2–1.2)
BUN SERPL-MCNC: 8 MG/DL — SIGNIFICANT CHANGE UP (ref 7–23)
CALCIUM SERPL-MCNC: 9.6 MG/DL — SIGNIFICANT CHANGE UP (ref 8.4–10.5)
CHLORIDE SERPL-SCNC: 106 MMOL/L — SIGNIFICANT CHANGE UP (ref 98–107)
CO2 SERPL-SCNC: 15 MMOL/L — LOW (ref 22–31)
CREAT SERPL-MCNC: 0.48 MG/DL — LOW (ref 0.5–1.3)
DACRYOCYTES BLD QL SMEAR: SLIGHT — SIGNIFICANT CHANGE UP
EGFR: 126 ML/MIN/1.73M2 — SIGNIFICANT CHANGE UP
EOSINOPHIL # BLD AUTO: 0.11 K/UL — SIGNIFICANT CHANGE UP (ref 0–0.5)
EOSINOPHIL NFR BLD AUTO: 0.9 % — SIGNIFICANT CHANGE UP (ref 0–6)
GLUCOSE SERPL-MCNC: 104 MG/DL — HIGH (ref 70–99)
LDH SERPL L TO P-CCNC: 155 U/L — SIGNIFICANT CHANGE UP (ref 135–225)
LIDOCAIN IGE QN: 25 U/L — SIGNIFICANT CHANGE UP (ref 7–60)
LYMPHOCYTES # BLD AUTO: 1.71 K/UL — SIGNIFICANT CHANGE UP (ref 1–3.3)
LYMPHOCYTES # BLD AUTO: 13.5 % — SIGNIFICANT CHANGE UP (ref 13–44)
MACROCYTES BLD QL: SLIGHT — SIGNIFICANT CHANGE UP
MANUAL SMEAR VERIFICATION: SIGNIFICANT CHANGE UP
MONOCYTES # BLD AUTO: 0.8 K/UL — SIGNIFICANT CHANGE UP (ref 0–0.9)
MONOCYTES NFR BLD AUTO: 6.3 % — SIGNIFICANT CHANGE UP (ref 2–14)
MYELOCYTES NFR BLD: 1.8 % — HIGH (ref 0–0)
NEUTROPHILS # BLD AUTO: 8.79 K/UL — HIGH (ref 1.8–7.4)
NEUTROPHILS NFR BLD AUTO: 69.4 % — SIGNIFICANT CHANGE UP (ref 43–77)
OVALOCYTES BLD QL SMEAR: SLIGHT — SIGNIFICANT CHANGE UP
PLAT MORPH BLD: NORMAL — SIGNIFICANT CHANGE UP
PLATELET COUNT - ESTIMATE: NORMAL — SIGNIFICANT CHANGE UP
POIKILOCYTOSIS BLD QL AUTO: SLIGHT — SIGNIFICANT CHANGE UP
POLYCHROMASIA BLD QL SMEAR: SLIGHT — SIGNIFICANT CHANGE UP
POTASSIUM SERPL-MCNC: 3.5 MMOL/L — SIGNIFICANT CHANGE UP (ref 3.5–5.3)
POTASSIUM SERPL-SCNC: 3.5 MMOL/L — SIGNIFICANT CHANGE UP (ref 3.5–5.3)
PROT SERPL-MCNC: 6.5 G/DL — SIGNIFICANT CHANGE UP (ref 6–8.3)
RBC BLD AUTO: ABNORMAL
SODIUM SERPL-SCNC: 138 MMOL/L — SIGNIFICANT CHANGE UP (ref 135–145)
URATE SERPL-MCNC: 5.4 MG/DL — SIGNIFICANT CHANGE UP (ref 2.5–7)
VARIANT LYMPHS # BLD: 8.1 % — HIGH (ref 0–6)

## 2024-08-01 DIAGNOSIS — M54.9 DORSALGIA, UNSPECIFIED: ICD-10-CM

## 2024-08-01 DIAGNOSIS — Z87.59 PERSONAL HISTORY OF OTHER COMPLICATIONS OF PREGNANCY, CHILDBIRTH AND THE PUERPERIUM: ICD-10-CM

## 2024-08-01 DIAGNOSIS — O99.513 DISEASES OF THE RESPIRATORY SYSTEM COMPLICATING PREGNANCY, THIRD TRIMESTER: ICD-10-CM

## 2024-08-01 DIAGNOSIS — O09.523 SUPERVISION OF ELDERLY MULTIGRAVIDA, THIRD TRIMESTER: ICD-10-CM

## 2024-08-01 DIAGNOSIS — J45.909 UNSPECIFIED ASTHMA, UNCOMPLICATED: ICD-10-CM

## 2024-08-01 DIAGNOSIS — O16.3 UNSPECIFIED MATERNAL HYPERTENSION, THIRD TRIMESTER: ICD-10-CM

## 2024-08-01 DIAGNOSIS — R19.7 DIARRHEA, UNSPECIFIED: ICD-10-CM

## 2024-08-01 DIAGNOSIS — R10.2 PELVIC AND PERINEAL PAIN: ICD-10-CM

## 2024-08-01 DIAGNOSIS — O36.8130 DECREASED FETAL MOVEMENTS, THIRD TRIMESTER, NOT APPLICABLE OR UNSPECIFIED: ICD-10-CM

## 2024-08-01 DIAGNOSIS — Z3A.29 29 WEEKS GESTATION OF PREGNANCY: ICD-10-CM

## 2024-08-01 DIAGNOSIS — O26.893 OTHER SPECIFIED PREGNANCY RELATED CONDITIONS, THIRD TRIMESTER: ICD-10-CM

## 2024-08-01 DIAGNOSIS — O99.891 OTHER SPECIFIED DISEASES AND CONDITIONS COMPLICATING PREGNANCY: ICD-10-CM

## 2024-08-01 LAB
CULTURE RESULTS: SIGNIFICANT CHANGE UP
SPECIMEN SOURCE: SIGNIFICANT CHANGE UP

## 2024-08-25 ENCOUNTER — OUTPATIENT (OUTPATIENT)
Dept: OUTPATIENT SERVICES | Facility: HOSPITAL | Age: 34
LOS: 1 days | End: 2024-08-25
Payer: COMMERCIAL

## 2024-08-25 VITALS — HEART RATE: 120 BPM | OXYGEN SATURATION: 98 %

## 2024-08-25 VITALS — HEART RATE: 105 BPM | TEMPERATURE: 98 F | DIASTOLIC BLOOD PRESSURE: 73 MMHG | SYSTOLIC BLOOD PRESSURE: 128 MMHG

## 2024-08-25 DIAGNOSIS — O26.899 OTHER SPECIFIED PREGNANCY RELATED CONDITIONS, UNSPECIFIED TRIMESTER: ICD-10-CM

## 2024-08-25 DIAGNOSIS — Z98.890 OTHER SPECIFIED POSTPROCEDURAL STATES: Chronic | ICD-10-CM

## 2024-08-25 LAB
ALBUMIN SERPL ELPH-MCNC: 3.6 G/DL — SIGNIFICANT CHANGE UP (ref 3.3–5)
ALP SERPL-CCNC: 174 U/L — HIGH (ref 40–120)
ALT FLD-CCNC: 17 U/L — SIGNIFICANT CHANGE UP (ref 10–45)
ANION GAP SERPL CALC-SCNC: 11 MMOL/L — SIGNIFICANT CHANGE UP (ref 5–17)
ANISOCYTOSIS BLD QL: SLIGHT — SIGNIFICANT CHANGE UP
APPEARANCE UR: CLEAR — SIGNIFICANT CHANGE UP
AST SERPL-CCNC: 14 U/L — SIGNIFICANT CHANGE UP (ref 10–40)
BACTERIA # UR AUTO: ABNORMAL /HPF
BASOPHILS # BLD AUTO: 0.05 K/UL — SIGNIFICANT CHANGE UP (ref 0–0.2)
BASOPHILS NFR BLD AUTO: 0.4 % — SIGNIFICANT CHANGE UP (ref 0–2)
BILIRUB SERPL-MCNC: 0.1 MG/DL — LOW (ref 0.2–1.2)
BILIRUB UR-MCNC: NEGATIVE — SIGNIFICANT CHANGE UP
BUN SERPL-MCNC: 6 MG/DL — LOW (ref 7–23)
CALCIUM SERPL-MCNC: 9.5 MG/DL — SIGNIFICANT CHANGE UP (ref 8.4–10.5)
CAST: 1 /LPF — SIGNIFICANT CHANGE UP (ref 0–4)
CHLORIDE SERPL-SCNC: 106 MMOL/L — SIGNIFICANT CHANGE UP (ref 96–108)
CO2 SERPL-SCNC: 19 MMOL/L — LOW (ref 22–31)
COLOR SPEC: YELLOW — SIGNIFICANT CHANGE UP
CREAT ?TM UR-MCNC: 201 MG/DL — SIGNIFICANT CHANGE UP
CREAT SERPL-MCNC: 0.52 MG/DL — SIGNIFICANT CHANGE UP (ref 0.5–1.3)
DIFF PNL FLD: NEGATIVE — SIGNIFICANT CHANGE UP
EGFR: 123 ML/MIN/1.73M2 — SIGNIFICANT CHANGE UP
EOSINOPHIL # BLD AUTO: 0.05 K/UL — SIGNIFICANT CHANGE UP (ref 0–0.5)
EOSINOPHIL NFR BLD AUTO: 0.4 % — SIGNIFICANT CHANGE UP (ref 0–6)
GLUCOSE SERPL-MCNC: 106 MG/DL — HIGH (ref 70–99)
GLUCOSE UR QL: NEGATIVE MG/DL — SIGNIFICANT CHANGE UP
HCT VFR BLD CALC: 34.1 % — LOW (ref 34.5–45)
HGB BLD-MCNC: 11.4 G/DL — LOW (ref 11.5–15.5)
KETONES UR-MCNC: ABNORMAL MG/DL
LDH SERPL L TO P-CCNC: 146 U/L — SIGNIFICANT CHANGE UP (ref 50–242)
LEUKOCYTE ESTERASE UR-ACNC: ABNORMAL
LYMPHOCYTES # BLD AUTO: 16.8 % — SIGNIFICANT CHANGE UP (ref 13–44)
LYMPHOCYTES # BLD AUTO: 2.04 K/UL — SIGNIFICANT CHANGE UP (ref 1–3.3)
MANUAL SMEAR VERIFICATION: SIGNIFICANT CHANGE UP
MCHC RBC-ENTMCNC: 30.3 PG — SIGNIFICANT CHANGE UP (ref 27–34)
MCHC RBC-ENTMCNC: 33.4 GM/DL — SIGNIFICANT CHANGE UP (ref 32–36)
MCV RBC AUTO: 90.7 FL — SIGNIFICANT CHANGE UP (ref 80–100)
MICROCYTES BLD QL: SLIGHT — SIGNIFICANT CHANGE UP
MONOCYTES # BLD AUTO: 0.4 K/UL — SIGNIFICANT CHANGE UP (ref 0–0.9)
MONOCYTES NFR BLD AUTO: 3.3 % — SIGNIFICANT CHANGE UP (ref 2–14)
MYELOCYTES NFR BLD: 0.4 % — HIGH (ref 0–0)
NEUTROPHILS # BLD AUTO: 9.56 K/UL — HIGH (ref 1.8–7.4)
NEUTROPHILS NFR BLD AUTO: 78.7 % — HIGH (ref 43–77)
NITRITE UR-MCNC: NEGATIVE — SIGNIFICANT CHANGE UP
PH UR: 7 — SIGNIFICANT CHANGE UP (ref 5–8)
PLAT MORPH BLD: NORMAL — SIGNIFICANT CHANGE UP
PLATELET # BLD AUTO: 221 K/UL — SIGNIFICANT CHANGE UP (ref 150–400)
POLYCHROMASIA BLD QL SMEAR: SLIGHT — SIGNIFICANT CHANGE UP
POTASSIUM SERPL-MCNC: 3.6 MMOL/L — SIGNIFICANT CHANGE UP (ref 3.5–5.3)
POTASSIUM SERPL-SCNC: 3.6 MMOL/L — SIGNIFICANT CHANGE UP (ref 3.5–5.3)
PROT ?TM UR-MCNC: 21 MG/DL — HIGH (ref 0–12)
PROT SERPL-MCNC: 6.6 G/DL — SIGNIFICANT CHANGE UP (ref 6–8.3)
PROT UR-MCNC: SIGNIFICANT CHANGE UP MG/DL
PROT/CREAT UR-RTO: 0.1 RATIO — SIGNIFICANT CHANGE UP (ref 0–0.2)
RBC # BLD: 3.76 M/UL — LOW (ref 3.8–5.2)
RBC # FLD: 13.1 % — SIGNIFICANT CHANGE UP (ref 10.3–14.5)
RBC BLD AUTO: SIGNIFICANT CHANGE UP
RBC CASTS # UR COMP ASSIST: 2 /HPF — SIGNIFICANT CHANGE UP (ref 0–4)
REVIEW: SIGNIFICANT CHANGE UP
SODIUM SERPL-SCNC: 136 MMOL/L — SIGNIFICANT CHANGE UP (ref 135–145)
SP GR SPEC: 1.02 — SIGNIFICANT CHANGE UP (ref 1–1.03)
SQUAMOUS # UR AUTO: 10 /HPF — HIGH (ref 0–5)
URATE SERPL-MCNC: 5.1 MG/DL — SIGNIFICANT CHANGE UP (ref 2.5–7)
UROBILINOGEN FLD QL: 1 MG/DL — SIGNIFICANT CHANGE UP (ref 0.2–1)
WBC # BLD: 12.15 K/UL — HIGH (ref 3.8–10.5)
WBC # FLD AUTO: 12.15 K/UL — HIGH (ref 3.8–10.5)
WBC UR QL: 5 /HPF — SIGNIFICANT CHANGE UP (ref 0–5)

## 2024-08-25 PROCEDURE — 84550 ASSAY OF BLOOD/URIC ACID: CPT

## 2024-08-25 PROCEDURE — 93971 EXTREMITY STUDY: CPT

## 2024-08-25 PROCEDURE — 81001 URINALYSIS AUTO W/SCOPE: CPT

## 2024-08-25 PROCEDURE — 83615 LACTATE (LD) (LDH) ENZYME: CPT

## 2024-08-25 PROCEDURE — 82570 ASSAY OF URINE CREATININE: CPT

## 2024-08-25 PROCEDURE — 59025 FETAL NON-STRESS TEST: CPT | Mod: 26

## 2024-08-25 PROCEDURE — 59025 FETAL NON-STRESS TEST: CPT

## 2024-08-25 PROCEDURE — G0463: CPT

## 2024-08-25 PROCEDURE — 99221 1ST HOSP IP/OBS SF/LOW 40: CPT | Mod: 25,GC

## 2024-08-25 PROCEDURE — 93971 EXTREMITY STUDY: CPT | Mod: 26,LT

## 2024-08-25 PROCEDURE — 85025 COMPLETE CBC W/AUTO DIFF WBC: CPT

## 2024-08-25 PROCEDURE — 84156 ASSAY OF PROTEIN URINE: CPT

## 2024-08-25 PROCEDURE — 80053 COMPREHEN METABOLIC PANEL: CPT

## 2024-08-25 RX ORDER — ACETAMINOPHEN 325 MG/1
975 TABLET ORAL ONCE
Refills: 0 | Status: COMPLETED | OUTPATIENT
Start: 2024-08-25 | End: 2024-08-25

## 2024-08-25 RX ADMIN — Medication 1000 MILLILITER(S): at 16:31

## 2024-08-25 NOTE — OB PROVIDER TRIAGE NOTE - ADDITIONAL INSTRUCTIONS
Strict return precautions for worsening headaches/visual changes, severe abdominal pain, SBPs >160s and/or DBPs >110s, VB, LOF, painful regular contractions.

## 2024-08-25 NOTE — OB PROVIDER TRIAGE NOTE - HISTORY OF PRESENT ILLNESS
35yo  @32w6d w/ hx gHTN p/w elevated BPs at home. Pt reports BPs of 126/107 and 137/117 earlier today. Pt also states she's had mild HA the last 2 days, and she has not taken any Tylenol. It was 8/10 in severity earlier today, but improved with oral hydration and is currently 4/10 in severity. Also reports increased swelling in her ankles and some pain in her L leg. –VB, -LOF, -Ctx, +FM. Denies fever, chills, nausea, vomiting, diarrhea, headache, constipation, dizziness, syncope, chest pain, palpitations, shortness of breath, dysuria, urgency, frequency.  PNC: [place]is    EFW: #  ObHx:  - VD@36w i/s/o sPEC - 6#7 (2019), TOPx1   GynHx: denies  MedHx: asthma (last asthma attack 2 years ago when pt had Covid; no hx hospitalizations or intubations), kidney stones  PSHx: L foot surgery  PsychHx: denies  SocialHx: denies  AllergyHx:   RxHx: albuterol PRN, PNV   33yo  @32w6d w/ hx gHTN p/w elevated BPs at home. Pt reports BPs of 126/107 and 137/117 earlier today. Pt also states she's had mild HA the last 2 days, and she has not taken any Tylenol. It was 8/10 in severity earlier today, but improved with oral hydration and is currently 4/10 in severity. Also reports increased swelling in her ankles and some pain in her L leg. –VB, -LOF, -Ctx, +FM. Denies fever, chills, nausea, vomiting, diarrhea, headache, constipation, dizziness, syncope, chest pain, palpitations, shortness of breath, dysuria, urgency, frequency.  PNC: [place]    ObHx:  - VD@36w i/s/o sPEC - 6#7 (2019), TOPx1   GynHx: denies  MedHx: asthma (last asthma attack 2 years ago when pt had Covid; no hx hospitalizations or intubations), kidney stones  PSHx: L foot surgery  PsychHx: denies  SocialHx: denies  AllergyHx: Aleve, Pepto Bismol, apples, peanuts (anaphylaxis)  RxHx: albuterol PRN, PNV

## 2024-08-25 NOTE — OB RN TRIAGE NOTE - FALL HARM RISK - UNIVERSAL INTERVENTIONS
Bed in lowest position, wheels locked, appropriate side rails in place/Call bell, personal items and telephone in reach/Instruct patient to call for assistance before getting out of bed or chair/Non-slip footwear when patient is out of bed/[place] to call system/Physically safe environment - no spills, clutter or unnecessary equipment/Purposeful Proactive Rounding/Room/bathroom lighting operational, light cord in reach

## 2024-08-25 NOTE — OB PROVIDER TRIAGE NOTE - NSOBPROVIDERNOTE_OBGYN_ALL_OB_FT
A/P: 35yo  @32w6d w/ hx gHTN p/w elevated BPs at home, mild HA, and L calf pain. In triage, pt with tachycardia 103-120, BPs normotensive upon presentation, physical exam notable for mild tenderness with palpation in L calf.  - 500cc LR bolus  - 975mg Tylenol PO  - f/u HELLP labs  - f/u LLE dopplers  - BP monitoring    D/w Dr. Sprague,   Gordo Streeter, PGY-3 A/P: 33yo  @32w6d w/ hx gHTN p/w elevated BPs at home, mild HA, and L calf pain. In triage, pt with tachycardia 103-120, BPs normotensive upon presentation, physical exam notable for mild tenderness with palpation in L calf. Pt offered Tylenol 975mg but is declining as HA is very mild and intermittent.  - 500cc LR bolus for maternal tachycardia, improving  - f/u HELLP labs  - f/u LLE dopplers  - BP monitoring    D/w Dr. Sprague,   Gordo Streeter, PGY-3 A/P: 33yo  @32w6d w/ hx gHTN p/w elevated BPs at home, mild HA, and L calf pain. In triage, pt with tachycardia 103-120, BPs normotensive upon presentation, physical exam notable for mild tenderness with palpation in L calf. Pt offered Tylenol 975mg but is declining as HA is very mild and intermittent. Pt monitored in triage for r/o sPEC and LLE DVT.  - 500cc LR bolus for maternal tachycardia  - f/u HELLP labs  - f/u LLE dopplers  - BP monitoring    D/w Dr. Sprague,   Gordo Streeter, PGY-3 A/P: 33yo  @32w6d w/ hx gHTN p/w elevated BPs at home, mild HA, and L calf pain. In triage, pt with tachycardia 103-120, BPs normotensive upon presentation, physical exam notable for mild tenderness with palpation in L calf. Pt offered Tylenol 975mg but is declining as HA is very mild and intermittent. Pt monitored in triage for r/o sPEC and LLE DVT.  - 500cc LR bolus for maternal tachycardia  - f/u HELLP labs  - f/u LLE dopplers  - BP monitoring    D/w Dr. Sprague,   Gordo Streeter, PGY-3      Addendum @7p  Vitals reviewed - intermittent mild range BPs, no severe range BPs, majority normotensive  HELLP labs reviewed - normal  LE Dopplers performed - negative  NST reactive, reviewed w/ Dr. Sprague  [place] non contractile  Patient is cleared for discharge with strict return precautions for worsening headaches/visual changes, severe abdominal pain, SBPs >160s and/or DBPs >110s, VB, LOF, painful regular contractions.  D/w Dr. Sprague  [facility] PGY3

## 2024-08-25 NOTE — OB PROVIDER TRIAGE NOTE - NSHPPHYSICALEXAM_GEN_ALL_CORE
T(C): 36.8 (08-25-24 @ 15:00), Max: 36.8 (08-25-24 @ 15:00)  HR: 103 (08-25-24 @ 16:01) (103 - 120)  BP: 134/77 (08-25-24 @ 16:01) (127/64 - 148/75)  RR: 20 (08-25-24 @ 15:00) (20 - 20)  SpO2: 97% (08-25-24 @ 15:58) (93% - 98%)    Gen: NAD  CV: clinically well perfused  Pulm: unlabored respirations  Abd: soft, NT, ND, gravid, no rebound or guarding  LE: no calf edema b/l, appropriate mild non-pitting edema b/l c/w gestational age, negative Yadira's sign, no palpable cord b/l but pt reports pain upon pressing L calf  SVE: deferred  FHT: 150, mod kamilah, + accels, - decels  TOCO: uterine irritability  Sono: cephalic T(C): 36.8 (08-25-24 @ 15:00), Max: 36.8 (08-25-24 @ 15:00)  HR: 103 (08-25-24 @ 16:01) (103 - 120)  BP: 134/77 (08-25-24 @ 16:01) (127/64 - 148/75)  RR: 20 (08-25-24 @ 15:00) (20 - 20)  SpO2: 97% (08-25-24 @ 15:58) (93% - 98%)    Gen: NAD  CV: clinically well perfused  Pulm: unlabored respirations  Abd: soft, NT, ND, gravid, no rebound or guarding  LE: no calf edema b/l, appropriate mild non-pitting edema b/l c/w gestational age, negative Yadira's sign, no palpable cord b/l but pt reports pain upon pressing L calf  SVE: deferred  FHT: 150, mod kamilah, + accels, - decels  TOCO: uterine irritability  Sono: cephalic, ant placenta, MVP 5.2, BPP 8/8 Performed

## 2024-08-25 NOTE — OB PROVIDER TRIAGE NOTE - ATTENDING COMMENTS
33yo  at 32w6d with known diagnosis of gestational HTN (no chronic hypertension), who presented with elevated BPs at home, mild headache, diarrhea, and L calf pain. Headache resolved without intervention.  BPs in triage with prolonged monitoring all normal.  Mild tachycardia to 100s, improved with PO intake and bolus.  Saturating well on room air.  HELLP labs wnl.  LLE dopplers without evidence of DVT.  Patient feeling well on re-exam.  Fetal status is reassuring, reactive NST. OK for discharge home, has follow up this week with OB, discussed bringing her cuff to the appointment to compare.   Karen Sprague MD

## 2024-08-26 PROBLEM — Z86.69 PERSONAL HISTORY OF OTHER DISEASES OF THE NERVOUS SYSTEM AND SENSE ORGANS: Chronic | Status: ACTIVE | Noted: 2024-07-30

## 2024-08-28 DIAGNOSIS — R00.0 TACHYCARDIA, UNSPECIFIED: ICD-10-CM

## 2024-08-28 DIAGNOSIS — J45.909 UNSPECIFIED ASTHMA, UNCOMPLICATED: ICD-10-CM

## 2024-08-28 DIAGNOSIS — Z86.16 PERSONAL HISTORY OF COVID-19: ICD-10-CM

## 2024-08-28 DIAGNOSIS — O99.513 DISEASES OF THE RESPIRATORY SYSTEM COMPLICATING PREGNANCY, THIRD TRIMESTER: ICD-10-CM

## 2024-08-28 DIAGNOSIS — O09.523 SUPERVISION OF ELDERLY MULTIGRAVIDA, THIRD TRIMESTER: ICD-10-CM

## 2024-08-28 DIAGNOSIS — O13.3 GESTATIONAL [PREGNANCY-INDUCED] HYPERTENSION WITHOUT SIGNIFICANT PROTEINURIA, THIRD TRIMESTER: ICD-10-CM

## 2024-08-28 DIAGNOSIS — O26.893 OTHER SPECIFIED PREGNANCY RELATED CONDITIONS, THIRD TRIMESTER: ICD-10-CM

## 2024-08-28 DIAGNOSIS — Z3A.32 32 WEEKS GESTATION OF PREGNANCY: ICD-10-CM

## 2024-09-23 ENCOUNTER — APPOINTMENT (OUTPATIENT)
Dept: ANTEPARTUM | Facility: CLINIC | Age: 34
End: 2024-09-23

## 2024-09-23 ENCOUNTER — INPATIENT (INPATIENT)
Facility: HOSPITAL | Age: 34
LOS: 5 days | Discharge: HOME | End: 2024-09-29
Attending: OBSTETRICS & GYNECOLOGY | Admitting: OBSTETRICS & GYNECOLOGY

## 2024-09-23 VITALS
HEART RATE: 104 BPM | SYSTOLIC BLOOD PRESSURE: 143 MMHG | TEMPERATURE: 98 F | RESPIRATION RATE: 16 BRPM | DIASTOLIC BLOOD PRESSURE: 88 MMHG

## 2024-09-23 DIAGNOSIS — O26.899 OTHER SPECIFIED PREGNANCY RELATED CONDITIONS, UNSPECIFIED TRIMESTER: ICD-10-CM

## 2024-09-23 DIAGNOSIS — Z98.890 OTHER SPECIFIED POSTPROCEDURAL STATES: Chronic | ICD-10-CM

## 2024-09-23 LAB
ALBUMIN SERPL ELPH-MCNC: 3.6 G/DL — SIGNIFICANT CHANGE UP (ref 3.3–5)
ALP SERPL-CCNC: 220 U/L — HIGH (ref 40–120)
ALT FLD-CCNC: 30 U/L — SIGNIFICANT CHANGE UP (ref 4–33)
ANION GAP SERPL CALC-SCNC: 14 MMOL/L — SIGNIFICANT CHANGE UP (ref 7–14)
APPEARANCE UR: ABNORMAL
AST SERPL-CCNC: 25 U/L — SIGNIFICANT CHANGE UP (ref 4–32)
BACTERIA # UR AUTO: NEGATIVE /HPF — SIGNIFICANT CHANGE UP
BASOPHILS # BLD AUTO: 0.04 K/UL — SIGNIFICANT CHANGE UP (ref 0–0.2)
BASOPHILS NFR BLD AUTO: 0.3 % — SIGNIFICANT CHANGE UP (ref 0–2)
BILIRUB SERPL-MCNC: 0.2 MG/DL — SIGNIFICANT CHANGE UP (ref 0.2–1.2)
BILIRUB UR-MCNC: NEGATIVE — SIGNIFICANT CHANGE UP
BLD GP AB SCN SERPL QL: NEGATIVE — SIGNIFICANT CHANGE UP
BUN SERPL-MCNC: 6 MG/DL — LOW (ref 7–23)
CALCIUM SERPL-MCNC: 9.8 MG/DL — SIGNIFICANT CHANGE UP (ref 8.4–10.5)
CAST: 0 /LPF — SIGNIFICANT CHANGE UP (ref 0–4)
CHLORIDE SERPL-SCNC: 105 MMOL/L — SIGNIFICANT CHANGE UP (ref 98–107)
CO2 SERPL-SCNC: 19 MMOL/L — LOW (ref 22–31)
COLOR SPEC: YELLOW — SIGNIFICANT CHANGE UP
CREAT ?TM UR-MCNC: 120 MG/DL — SIGNIFICANT CHANGE UP
CREAT SERPL-MCNC: 0.56 MG/DL — SIGNIFICANT CHANGE UP (ref 0.5–1.3)
DIFF PNL FLD: NEGATIVE — SIGNIFICANT CHANGE UP
EGFR: 121 ML/MIN/1.73M2 — SIGNIFICANT CHANGE UP
EOSINOPHIL # BLD AUTO: 0.21 K/UL — SIGNIFICANT CHANGE UP (ref 0–0.5)
EOSINOPHIL NFR BLD AUTO: 1.8 % — SIGNIFICANT CHANGE UP (ref 0–6)
FLUAV AG NPH QL: SIGNIFICANT CHANGE UP
FLUBV AG NPH QL: SIGNIFICANT CHANGE UP
GLUCOSE SERPL-MCNC: 103 MG/DL — HIGH (ref 70–99)
GLUCOSE UR QL: NEGATIVE MG/DL — SIGNIFICANT CHANGE UP
HCT VFR BLD CALC: 31.9 % — LOW (ref 34.5–45)
HGB BLD-MCNC: 10.7 G/DL — LOW (ref 11.5–15.5)
IANC: 7.96 K/UL — HIGH (ref 1.8–7.4)
IMM GRANULOCYTES NFR BLD AUTO: 1.8 % — HIGH (ref 0–0.9)
KETONES UR-MCNC: NEGATIVE MG/DL — SIGNIFICANT CHANGE UP
LDH SERPL L TO P-CCNC: 197 U/L — SIGNIFICANT CHANGE UP (ref 135–225)
LEUKOCYTE ESTERASE UR-ACNC: NEGATIVE — SIGNIFICANT CHANGE UP
LYMPHOCYTES # BLD AUTO: 18.8 % — SIGNIFICANT CHANGE UP (ref 13–44)
LYMPHOCYTES # BLD AUTO: 2.18 K/UL — SIGNIFICANT CHANGE UP (ref 1–3.3)
MCHC RBC-ENTMCNC: 29.2 PG — SIGNIFICANT CHANGE UP (ref 27–34)
MCHC RBC-ENTMCNC: 33.5 GM/DL — SIGNIFICANT CHANGE UP (ref 32–36)
MCV RBC AUTO: 87.2 FL — SIGNIFICANT CHANGE UP (ref 80–100)
MONOCYTES # BLD AUTO: 1 K/UL — HIGH (ref 0–0.9)
MONOCYTES NFR BLD AUTO: 8.6 % — SIGNIFICANT CHANGE UP (ref 2–14)
NEUTROPHILS # BLD AUTO: 7.96 K/UL — HIGH (ref 1.8–7.4)
NEUTROPHILS NFR BLD AUTO: 68.7 % — SIGNIFICANT CHANGE UP (ref 43–77)
NITRITE UR-MCNC: NEGATIVE — SIGNIFICANT CHANGE UP
NRBC # BLD: 0 /100 WBCS — SIGNIFICANT CHANGE UP (ref 0–0)
NRBC # FLD: 0 K/UL — SIGNIFICANT CHANGE UP (ref 0–0)
PH UR: 7 — SIGNIFICANT CHANGE UP (ref 5–8)
PLATELET # BLD AUTO: 242 K/UL — SIGNIFICANT CHANGE UP (ref 150–400)
POTASSIUM SERPL-MCNC: 3.7 MMOL/L — SIGNIFICANT CHANGE UP (ref 3.5–5.3)
POTASSIUM SERPL-SCNC: 3.7 MMOL/L — SIGNIFICANT CHANGE UP (ref 3.5–5.3)
PROT ?TM UR-MCNC: 15 MG/DL — SIGNIFICANT CHANGE UP
PROT SERPL-MCNC: 6.5 G/DL — SIGNIFICANT CHANGE UP (ref 6–8.3)
PROT UR-MCNC: SIGNIFICANT CHANGE UP MG/DL
PROT/CREAT UR-RTO: 0.1 RATIO — SIGNIFICANT CHANGE UP (ref 0–0.2)
RBC # BLD: 3.66 M/UL — LOW (ref 3.8–5.2)
RBC # FLD: 13.2 % — SIGNIFICANT CHANGE UP (ref 10.3–14.5)
RBC CASTS # UR COMP ASSIST: 1 /HPF — SIGNIFICANT CHANGE UP (ref 0–4)
RH IG SCN BLD-IMP: POSITIVE — SIGNIFICANT CHANGE UP
RH IG SCN BLD-IMP: POSITIVE — SIGNIFICANT CHANGE UP
RSV RNA NPH QL NAA+NON-PROBE: SIGNIFICANT CHANGE UP
SARS-COV-2 RNA SPEC QL NAA+PROBE: SIGNIFICANT CHANGE UP
SODIUM SERPL-SCNC: 138 MMOL/L — SIGNIFICANT CHANGE UP (ref 135–145)
SP GR SPEC: 1.02 — SIGNIFICANT CHANGE UP (ref 1–1.03)
SQUAMOUS # UR AUTO: 9 /HPF — HIGH (ref 0–5)
URATE SERPL-MCNC: 5.5 MG/DL — SIGNIFICANT CHANGE UP (ref 2.5–7)
UROBILINOGEN FLD QL: 0.2 MG/DL — SIGNIFICANT CHANGE UP (ref 0.2–1)
WBC # BLD: 11.6 K/UL — HIGH (ref 3.8–10.5)
WBC # FLD AUTO: 11.6 K/UL — HIGH (ref 3.8–10.5)
WBC UR QL: 2 /HPF — SIGNIFICANT CHANGE UP (ref 0–5)

## 2024-09-23 RX ORDER — SODIUM CHLORIDE IRRIG SOLUTION 0.9 %
1000 SOLUTION, IRRIGATION IRRIGATION
Refills: 0 | Status: DISCONTINUED | OUTPATIENT
Start: 2024-09-23 | End: 2024-09-24

## 2024-09-23 RX ORDER — AMPICILLIN TRIHYDRATE 125 MG/5ML
2 SUSPENSION, RECONSTITUTED, ORAL (ML) ORAL ONCE
Refills: 0 | Status: COMPLETED | OUTPATIENT
Start: 2024-09-23 | End: 2024-09-23

## 2024-09-23 RX ORDER — ALBUTEROL 90 MCG
2 AEROSOL (GRAM) INHALATION EVERY 6 HOURS
Refills: 0 | Status: DISCONTINUED | OUTPATIENT
Start: 2024-09-23 | End: 2024-09-29

## 2024-09-23 RX ORDER — SODIUM CITRATE AND CITRIC ACID MONOHYDRATE 334; 500 MG/5ML; MG/5ML
15 SOLUTION ORAL EVERY 6 HOURS
Refills: 0 | Status: DISCONTINUED | OUTPATIENT
Start: 2024-09-23 | End: 2024-09-24

## 2024-09-23 RX ORDER — CHLORHEXIDINE GLUCONATE ORAL RINSE 1.2 MG/ML
1 SOLUTION DENTAL DAILY
Refills: 0 | Status: DISCONTINUED | OUTPATIENT
Start: 2024-09-23 | End: 2024-09-24

## 2024-09-23 RX ORDER — AMPICILLIN TRIHYDRATE 125 MG/5ML
1 SUSPENSION, RECONSTITUTED, ORAL (ML) ORAL EVERY 4 HOURS
Refills: 0 | Status: DISCONTINUED | OUTPATIENT
Start: 2024-09-23 | End: 2024-09-24

## 2024-09-23 RX ORDER — OXYTOCIN/RINGER'S LACTATE 20/500ML
167 PLASTIC BAG, INJECTION (ML) INTRAVENOUS
Qty: 30 | Refills: 0 | Status: DISCONTINUED | OUTPATIENT
Start: 2024-09-23 | End: 2024-09-24

## 2024-09-23 RX ADMIN — CHLORHEXIDINE GLUCONATE ORAL RINSE 1 APPLICATION(S): 1.2 SOLUTION DENTAL at 21:44

## 2024-09-23 RX ADMIN — Medication 125 MILLILITER(S): at 21:55

## 2024-09-23 RX ADMIN — Medication 125 MILLILITER(S): at 18:18

## 2024-09-23 RX ADMIN — Medication 108 GRAM(S): at 22:14

## 2024-09-23 RX ADMIN — Medication 200 GRAM(S): at 18:15

## 2024-09-23 NOTE — OB PROVIDER H&P - NSLOWPPHRISK_OBGYN_A_OB
No previous uterine incision/[place] Pregnancy/Less than or equal to 4 previous vaginal births/No known bleeding disorder/No history of postpartum hemorrhage

## 2024-09-23 NOTE — OB RN TRIAGE NOTE - CHIEF COMPLAINT QUOTE
decreased fetal movement x 3 days , swelling decreased fetal movement x 3 days , swelling, headaches, diarrhea, and vomiting yesterday.

## 2024-09-23 NOTE — OB RN PATIENT PROFILE - NS PRO TDAP RECEIVED Y/N
deferred per COVID precautions intermittent substance use; medical problems medical deferred PMD, ER SW, ER PA discussed both logistics of future planning as well as more abstract plans for future no...

## 2024-09-23 NOTE — OB RN TRIAGE NOTE - NSICDXPASTMEDICALHX_GEN_ALL_CORE_FT
PAST MEDICAL HISTORY:  Asthma     History of sleep apnea     Sinusitis      PAST MEDICAL HISTORY:  Asthma     History of sleep apnea     HPV (human papilloma virus) infection     Sinusitis

## 2024-09-23 NOTE — OB RN PATIENT PROFILE - FUNCTIONAL ASSESSMENT - BASIC MOBILITY 4.
Anesthesia Post Evaluation    Patient: Eric Jackson    Procedure(s) Performed: Procedure(s) (LRB):  EGD (ESOPHAGOGASTRODUODENOSCOPY) (N/A)    Final Anesthesia Type: MAC    Patient location during evaluation: GI PACU  Patient participation: Yes- Able to Participate  Level of consciousness: awake and alert and oriented  Post-procedure vital signs: reviewed and stable  Pain management: adequate  Airway patency: patent    PONV status at discharge: No PONV  Anesthetic complications: no      Cardiovascular status: blood pressure returned to baseline, hemodynamically stable and stable  Respiratory status: unassisted, spontaneous ventilation and room air  Hydration status: euvolemic  Follow-up not needed.          Vitals Value Taken Time   /65 10/08/20 1324   Temp 36.6 °C (97.9 °F) 10/08/20 1324   Pulse 69 10/08/20 1324   Resp 18 10/08/20 1324   SpO2 97 % 10/08/20 1324         No case tracking events are documented in the log.      Pain/Edith Score: No data recorded      
4 = No assist / stand by assistance

## 2024-09-23 NOTE — OB PROVIDER H&P - NSICDXPASTMEDICALHX_GEN_ALL_CORE_FT
PAST MEDICAL HISTORY:  Asthma     History of sleep apnea     HPV (human papilloma virus) infection     Sinusitis

## 2024-09-23 NOTE — OB PROVIDER H&P - NSHPPHYSICALEXAM_GEN_ALL_CORE
VS: Vital Signs Last 24 Hrs  T(C): 36.7 (23 Sep 2024 16:01), Max: 36.7 (23 Sep 2024 15:38)  T(F): 98.06 (23 Sep 2024 16:01), Max: 98.1 (23 Sep 2024 15:38)  HR: 97 (23 Sep 2024 16:36) (95 - 104)  BP: 134/78 (23 Sep 2024 16:36) (132/79 - 147/90)  BP(mean): --  RR: 15 (23 Sep 2024 16:01) (15 - 16)  SpO2: --    GA: appears comfortable   Cards: RR, well perfused  Pulm: breathing comfortably on room air   Abdomen: gravid  SVE: 1/70/-3    TAUS: vtx, SAMEERA 14.3     EFH: 150/mod/+accels/-decels  [place]: intermittent ctx, q10m

## 2024-09-23 NOTE — OB RN TRIAGE NOTE - NS_OBGYNHISTORY_OBGYN_ALL_OB_FT
NSVDx1  Misc x 1  Ovarian cyst 1/15/2019  36 weeks IOL PEC 6#7  Misc x 1  Ovarian cyst 1/15/2019  36 weeks IOL PEC 6#7  Misc x 1 (no d&c)  Ovarian cyst

## 2024-09-23 NOTE — OB RN PATIENT PROFILE - FALL HARM RISK - UNIVERSAL INTERVENTIONS
Bed in lowest position, wheels locked, appropriate side rails in place/Call bell, personal items and telephone in reach/Instruct patient to call for assistance before getting out of bed or chair/Non-slip footwear when patient is out of bed/[place] to call system/Physically safe environment - no spills, clutter or unnecessary equipment/Purposeful Proactive Rounding/Room/bathroom lighting operational, light cord in reach

## 2024-09-23 NOTE — OB RN TRIAGE NOTE - NS_NUMBOFVISITS_OBGYN_ALL_OB_NU
Subjective     PROBLEM LIST:    Ataxia         CHIEF COMPLAINT: abnormal spep    HISTORY OF PRESENT ILLNESS:  The patient is a 75 y.o. male, admitted with confusion and ataxia.  He is being treated for pneumonia.  Due to his neurologic symptoms multiple labs were sent including an SPEP which showed an asymmetrical gamma.    The patient reports that he is feeling well today.  Multiple family members in the room.    REVIEW OF SYSTEMS:  A 14 point review of systems was performed and is negative except as noted above.    Past Medical History:   Diagnosis Date    Benign hypertension     History of eye surgery     Lower extremity edema     Mild obesity     PARKER (obstructive sleep apnea)     requiring CPAP    Osteoarthritis     Paroxysmal atrial fibrillation     Tachy-krishna syndrome        No current facility-administered medications on file prior to encounter.     Current Outpatient Medications on File Prior to Encounter   Medication Sig Dispense Refill    Cetirizine HCl (ZyrTEC Allergy) 10 MG capsule Take 10 mg by mouth Daily.      NON FORMULARY Take 1 capsule by mouth 2 (Two) Times a Day. VITAMINS A,C,E-ZINC-COPPER (ICAPS AREDS) [id number] UNIT-MG-UNIT CAP      oxybutynin (DITROPAN) 5 MG tablet Take 2 tablets by mouth Daily.      rivaroxaban (Xarelto) 20 MG tablet TAKE ONE (1) TABLET BY MOUTH DAILY. 90 tablet 3    sertraline (ZOLOFT) 50 MG tablet Take 1 tablet by mouth Daily.      torsemide (DEMADEX) 20 MG tablet TAKE ONE (1) TABLET BY MOUTH DAILY. 90 tablet 0       Allergies   Allergen Reactions    Naproxen      Patient states he has made changes on that       Past Surgical History:   Procedure Laterality Date    CARDIOVERSION      HERNIA REPAIR      INCISION AND DRAINAGE LEG Right 8/18/2017    Procedure: INCISION AND DRAINAGE RIGHT KNEE;  Surgeon: Francisco Macias MD;  [facility]: [facility];  Service:     JOINT REPLACEMENT      NEPHROLITHOTRIPSY PERCUTANEOUS             Social History     Socioeconomic  History    Marital status:    Tobacco Use    Smoking status: Former     Types: Cigarettes     Quit date:      Years since quittin.0    Smokeless tobacco: Former     Types: Chew   Vaping Use    Vaping Use: Never used   Substance and Sexual Activity    Alcohol use: Yes     Alcohol/week: 20.0 standard drinks of alcohol     Types: 20 Cans of beer per week     Comment: 3-4 drinks a day 4 or more times a week    Drug use: No    Sexual activity: Defer       Family History   Problem Relation Age of Onset    Hypertension Other     Cancer Sister     Alzheimer's disease Mother        Objective     Vitals:    24 1929 24 2359 24 0252 24 0750   BP: 144/77 142/81 141/80 (!) 148/101   BP Location: Left arm Left arm Left arm Left arm   Patient Position: Lying Lying Lying Lying   Pulse: 101 103 91 92   Resp: 18 18 18 18   Temp: 97.9 °F (36.6 °C) 98.1 °F (36.7 °C)  98.8 °F (37.1 °C)   TempSrc: Oral Axillary  Oral   SpO2: 96% 94% 94% 94%   Weight:       Height:                       Performance Status: 2  General: well appearing male in no acute distress  Neuro: alert and oriented  HEENT: sclerae anicteric, oropharynx clear  Extremities: no lower extremity edema  Skin: no rashes, lesions, bruising, or petechiae  Psych: mood and affect appropriate    Lab Results   Component Value Date    WBC 8.26 2024    HGB 14.0 2024    HCT 40.6 2024    MCV 94.0 2024     2024     Lab Results   Component Value Date    GLUCOSE 109 (H) 2024    BUN 13 2024    CREATININE 0.74 (L) 2024    EGFRIFNONA 96 2021    BCR 17.6 2024    K 3.3 (L) 2024    CO2 28.0 2024    CALCIUM 8.2 (L) 2024    PROTENTOTREF 5.7 (L) 2024    ALBUMIN 3.0 2024    LABIL2 1.1 2024    AST 26 2024    ALT 11 2024     XR Chest 1 View  Narrative: XR CHEST 1 VW    Date of Exam: 2024 1:18 AM EST    Indication: sob    Comparison: CT chest  1/3/2024 and chest radiograph 1/2/2024.    Findings:  There is increasing consolidated pneumonia in the right mid and lower lung. There is new left basilar airspace disease although evaluation is limited due to positioning and technique. There is no pneumothorax or definite pleural effusion. The heart is   enlarged. Pulmonary vasculature is obscured on the right, normal on the left.  Impression: Impression:  Increasing consolidated pneumonia in the right mid and lower lung and new left basilar airspace disease.    Electronically Signed: Mayco Coelho MD    1/5/2024 1:39 AM EST    Workstation ID: [id number]      I personally reviewed the imaging studies    Assessment & Plan     Favian Arellano is a 75 y.o. male admitted with confusion and ataxia.  SPEP showed asymmetrical gamma.    He does not have anemia, renal dysfunction, hypercalcemia, or elevated serum protein.  Imaging does not show any bone lesions or adenopathy.    Very low suspicion that this lab finding is of any clinical significance.  I will send immunofixation and serum free light chains.  He does not need to wait on these results for discharge.  If he is confirmed to have a monoclonal protein detectable in his blood, then he can be followed as an outpatient for MGUS.           Trixie Hu MD    1/6/2024          7

## 2024-09-23 NOTE — OB PROVIDER H&P - ASSESSMENT
A&P:   Pt is a 33y/o  at 37w with PNC c/b gHTN presenting with decreased FM and HA.  - HELLP labs sent, r/o PEC. Continue BP monitoring   - By ACOG guidelines patient should be delivered at 37w for gHTN  - Admit to labor and delivery  - Labs, IVF, EFM  - Begin induction with buccal cytotec and cervical balloon     Suri Garcia, PGY2  d/w Dr. Collins

## 2024-09-23 NOTE — OB PROVIDER LABOR PROGRESS NOTE - NS_OBIHIFHRDETAILS_OBGYN_ALL_OB_FT
Writer gave report to Zamzam EDMONDSON and nurse to nurse completed. Patient will go to room 161. Dr Barker will see the patient and Dr Connolly accepts the patient for treatment.    140 mod kamilah/+accels/-decels

## 2024-09-23 NOTE — OB RN PATIENT PROFILE - AVIAN FLU WORK
No Video- Visit Details  Type of service:  video visit for mental health treatment.  Time of service:  Date:  11/17/2023  Video Start Time:  11:34 AM        Video End Time: 12:12 PM    Reason for video visit: Client preference  Originating Site (patient location): [facility]  Distant Site (provider location):  Provider's home office  Mode of Communication:  Video Conference via [employer]Well      Consent: This virtual video visit will be conducted via a video call between the client and his therapist. This service lets us provide the care the patient needs with a video conversation. If during the course of the video call the provider feels a video visit is not appropriate, the patient will not be charged for this service. The patient has verbally consented to: the potential risks and benefits of telemedicine (video) versus in person care; bill my insurance or make self-payment for services provided; and responsibility for payment of non-covered services.     How would the patient like to obtain the AVS?  Martha    As the provider I attest to compliance with applicable laws and regulations related to telemedicine.    OUTPATIENT PSYCHOTHERAPY PROGRESS NOTE    Client Name: Barrett Bazan   YOB: 2003 (20 year old)   Service Type(s): 99494 psychotherapy (38 min. with patient)    Diagnoses:   Panic disorder with agorophobia    Individuals Present:   Client attended alone    Treatment goal(s) being addressed:  1) Decoupling the physiologic bodily sensations and the ruminative thoughts of a fatal illness      Subjective:  -Barrett was late to the appointment due to having to sit for a test. He attended the session and asked not to do an ERP today.  -He had a rough week, his test was lost and not submitted when taken it last week so he had to study again for that test which caused a lot of anxiety.  -He has been worried about his mom's health, and busy with the internship application.  -More anxious, had to use  trazodone every night.  -Used Lorazepam once to abort a panic attack.  -Not sure if he wants to go home this weekend.  -Rates depression 5/10 and manageable. No SI/HI. No safety concerns      Exposure Tracking Log   Date and Time Exercise Length SUDS Rating (0-10)   23 27 minutes Beginnin  Peak: 3  End:0   23  None  N/A             Goal: Exposure Response Prevention therapy to address somatic symptoms (fixation on heart, weird sensations in the chest, aneurysm)    Treatment:  Supportive therapy     Today, Barrett chose not to have an exposure. We focused on the current stressful thoughts in his life. Writer provided emotional support, validation and active listening.    Assessment and Progress:   Barrett is a 20 year-old with history of depression and anxiety who is referred by psychiatrist for supportive psychotherapy with the working diagnosis of depressive disorder in remission. In our sessions he was found to have ruminative thoughts about fatal illnesses such as heart attack, aneurysm which leads to avoidance behavior and trigger anxiety/panic attacks. Barrett started a 12 week course of CBT in the format of ERP however due to summer breaks and moving back to [place] he was not able to follow up with the clinic.    Barrett realized that when he Is exposed to cues like hearing heart attack being discussed it is not distressing at the moment and SUDS score doesn't raise significantly. The plan would be to share therapist's screen  from the beginning of the session to have a longer exposure time.      Plan:   Weekly CBT sessions with ERP. Client is going to work on exposures and documenting the level of difficulty of the exposures each time. Next therapy appointment has been scheduled in a week to continue work on treatment goals.    Treatment Plan          SYMPTOMS; PROBLEMS   MEASURABLE GOALS;    FUNCTIONAL IMPROVEMENT INTERVENTIONS;   GAINS MADE DISCHARGE CRITERIA   Ruminative thoughts related to fatal  illnesses   Decoupling the physiologic bodily sensations and the ruminative thoughts of a fatal illness psycho-education   psychotherapy  relaxation techniques   stress management marked symptom improvement and reduced visit frequency     Date of most recent treatment plan: 10/20/2023  Date next treatment plan due: 3 months    Patient reported changes to current medication list and has been directed to notify the appropriate medical provider    Psychotherapy services during this visit included myself and the patient. Patient agreed with treatment plan. Discussed case with supervisor who also agreed with the treatment plan. Unable to sign in person due to visit being conducted through telehealth.     Aguilar Mccracken MD  Psychiatry Resident    Case discussed with attending therapist who was not present in person.

## 2024-09-23 NOTE — OB RN TRIAGE NOTE - FALL HARM RISK - UNIVERSAL INTERVENTIONS
Bed in lowest position, wheels locked, appropriate side rails in place/Call bell, personal items and telephone in reach/Instruct patient to call for assistance before getting out of bed or chair/Non-slip footwear when patient is out of bed/[place] to call system/Physically safe environment - no spills, clutter or unnecessary equipment/Purposeful Proactive Rounding/Room/bathroom lighting operational, light cord in reach

## 2024-09-23 NOTE — OB PROVIDER H&P - HISTORY OF PRESENT ILLNESS
Pt is a 35y/o  at 37w with PNC c/b gHTN presenting with decreased FM and HA. Patient reports intermittent contractions over past 3 days, nothing consistent.   Pt denies VB, LOF  Prenatal course c/b gHTN  GBS +  EFW 3600g by [place]     OBHx:   -   IOL @36w for sPEC  - TOPx1 medication   GynHx: Hx HPV on prior PAP, resolved. No hx fibroids, cysts, STIs, endometriosis  PMHx: Asthma (hospitalized as a child, never intubated, last used inhaler 3 days ago), Sleep apnea on CPAP  PSHx: L foot surgery  Med: Albuterol PRN  All: Anaphylactic rxn after consuming peanuts, taking aleve and pepto bismol - unsure which caused rxn   SH: Denies a/d/t   Psych: Denies

## 2024-09-24 LAB — T PALLIDUM AB TITR SER: NEGATIVE — SIGNIFICANT CHANGE UP

## 2024-09-24 RX ORDER — KETOROLAC TROMETHAMINE 10 MG/1
30 TABLET, FILM COATED ORAL ONCE
Refills: 0 | Status: DISCONTINUED | OUTPATIENT
Start: 2024-09-24 | End: 2024-09-24

## 2024-09-24 RX ORDER — DIPHENHYDRAMINE HCL 12.5MG/5ML
25 LIQUID (ML) ORAL EVERY 6 HOURS
Refills: 0 | Status: DISCONTINUED | OUTPATIENT
Start: 2024-09-24 | End: 2024-09-29

## 2024-09-24 RX ORDER — PRAMOXINE HYDROCHLORIDE 10 MG/ML
1 LOTION TOPICAL EVERY 4 HOURS
Refills: 0 | Status: DISCONTINUED | OUTPATIENT
Start: 2024-09-24 | End: 2024-09-29

## 2024-09-24 RX ORDER — OXYCODONE HYDROCHLORIDE 30 MG/1
5 TABLET, FILM COATED, EXTENDED RELEASE ORAL ONCE
Refills: 0 | Status: DISCONTINUED | OUTPATIENT
Start: 2024-09-24 | End: 2024-09-29

## 2024-09-24 RX ORDER — SODIUM CHLORIDE 0.9 % (FLUSH) 0.9 %
3 SYRINGE (ML) INJECTION EVERY 8 HOURS
Refills: 0 | Status: DISCONTINUED | OUTPATIENT
Start: 2024-09-24 | End: 2024-09-29

## 2024-09-24 RX ORDER — TETANUS TOXOID, REDUCED DIPHTHERIA TOXOID AND ACELLULAR PERTUSSIS VACCINE, ADSORBED 5; 2.5; 8; 8; 2.5 [IU]/.5ML; [IU]/.5ML; UG/.5ML; UG/.5ML; UG/.5ML
0.5 SUSPENSION INTRAMUSCULAR ONCE
Refills: 0 | Status: DISCONTINUED | OUTPATIENT
Start: 2024-09-24 | End: 2024-09-29

## 2024-09-24 RX ORDER — SOAP/LANOLIN
1 BAR TOPICAL EVERY 4 HOURS
Refills: 0 | Status: DISCONTINUED | OUTPATIENT
Start: 2024-09-24 | End: 2024-09-29

## 2024-09-24 RX ORDER — ANTI-ITCH CREAM 1 G/100G
1 OINTMENT TOPICAL EVERY 6 HOURS
Refills: 0 | Status: DISCONTINUED | OUTPATIENT
Start: 2024-09-24 | End: 2024-09-29

## 2024-09-24 RX ORDER — DIBUCAINE 1 %
1 OINTMENT (GRAM) TOPICAL EVERY 6 HOURS
Refills: 0 | Status: DISCONTINUED | OUTPATIENT
Start: 2024-09-24 | End: 2024-09-29

## 2024-09-24 RX ORDER — ACETAMINOPHEN 325 MG
975 TABLET ORAL
Refills: 0 | Status: DISCONTINUED | OUTPATIENT
Start: 2024-09-24 | End: 2024-09-29

## 2024-09-24 RX ORDER — MAGNESIUM HYDROXIDE 400 MG/5ML
30 SUSPENSION, ORAL (FINAL DOSE FORM) ORAL
Refills: 0 | Status: DISCONTINUED | OUTPATIENT
Start: 2024-09-24 | End: 2024-09-29

## 2024-09-24 RX ORDER — PRENATAL VIT,CAL 76/IRON/FOLIC 29 MG-1 MG
1 TABLET ORAL DAILY
Refills: 0 | Status: DISCONTINUED | OUTPATIENT
Start: 2024-09-24 | End: 2024-09-29

## 2024-09-24 RX ORDER — OXYCODONE HYDROCHLORIDE 30 MG/1
5 TABLET, FILM COATED, EXTENDED RELEASE ORAL
Refills: 0 | Status: DISCONTINUED | OUTPATIENT
Start: 2024-09-24 | End: 2024-09-29

## 2024-09-24 RX ORDER — OXYTOCIN/RINGER'S LACTATE 20/500ML
167 PLASTIC BAG, INJECTION (ML) INTRAVENOUS
Qty: 30 | Refills: 0 | Status: DISCONTINUED | OUTPATIENT
Start: 2024-09-24 | End: 2024-09-24

## 2024-09-24 RX ORDER — OXYTOCIN/RINGER'S LACTATE 20/500ML
PLASTIC BAG, INJECTION (ML) INTRAVENOUS
Qty: 30 | Refills: 0 | Status: DISCONTINUED | OUTPATIENT
Start: 2024-09-24 | End: 2024-09-24

## 2024-09-24 RX ADMIN — Medication 2 MILLIUNIT(S)/MIN: at 08:50

## 2024-09-24 RX ADMIN — Medication 975 MILLIGRAM(S): at 17:40

## 2024-09-24 RX ADMIN — OXYCODONE HYDROCHLORIDE 5 MILLIGRAM(S): 30 TABLET, FILM COATED, EXTENDED RELEASE ORAL at 22:35

## 2024-09-24 RX ADMIN — KETOROLAC TROMETHAMINE 30 MILLIGRAM(S): 10 TABLET, FILM COATED ORAL at 13:09

## 2024-09-24 RX ADMIN — OXYCODONE HYDROCHLORIDE 5 MILLIGRAM(S): 30 TABLET, FILM COATED, EXTENDED RELEASE ORAL at 23:05

## 2024-09-24 RX ADMIN — Medication 108 GRAM(S): at 06:18

## 2024-09-24 RX ADMIN — Medication 975 MILLIGRAM(S): at 18:10

## 2024-09-24 RX ADMIN — Medication 167 MILLIUNIT(S)/MIN: at 10:20

## 2024-09-24 RX ADMIN — Medication 3 MILLILITER(S): at 22:35

## 2024-09-24 RX ADMIN — Medication 108 GRAM(S): at 02:20

## 2024-09-24 RX ADMIN — Medication 2 PUFF(S): at 09:29

## 2024-09-24 NOTE — OB NEONATOLOGY/PEDIATRICIAN DELIVERY SUMMARY - NSPEDSNEONOTESA_OBGYN_ALL_OB_FT
NICU called to LDR for shoulder dystocia . Baby is a 36.3 week  LGA female born via  to a ___ y/o G ___ mother.  Maternal history of _____ or No significant maternal or prenatal history. Maternal labs include blood type ___ , HIV Ag/Ab nonreactive, RPR nonreactive, rubella immune, HBsAg negative, GBS + / - on ___ (received amp x ___ , first dose at ___ on ___ no IAP). ROM at __ on __ with clear / meconium fluids (ROM hours: ___ H ___ M).  Baby emerged vigorous, crying, was w/d/s/s with APGARS of __ / __ . ___ Nuchal x 1. Resuscitation included: tactile stimulation and bulb suction. Mom plans to initiate breastfeeding / formula feed, consents / declines Hep B vaccine and consents / declines circ.  Highest maternal temp: ___ . EOS ___ .    : ___  TOB: ___  Weight: ___ g NICU called to LDR for shoulder dystocia . Baby is a 37.1 week  LGA female born via  to a 35 y/o G [id number] mother.  Maternal history of postpartum depression, anemia, asthma. Pregnancy complicated by gHTN and decreased FM.  Maternal labs include blood type 0+, HIV Ag/Ab nonreactive, RPR nonreactive, rubella immune, HBsAg negative, GBS + (received amp x 1 , first dose at 0600 on  no IAP). ROM at 0745 on  with clear fluids (ROM hours: 2 H ).  Baby emerged vigorous, crying, was w/d/s/s with APGARS of 7 /8 . Resuscitation included: tactile stimulation and bulb suction. Mom plans to initiate breastfeeding / formula feed, consents Hep B vaccine.  Highest maternal temp: 37.2 . EOS 0.11.    : 24  TOB: 0954  Weight: ___ g NICU called to LDR for shoulder dystocia . Baby is a 37.1 week  LGA female born via  to a 37 y/o G [id number] mother.  Maternal history of postpartum depression, anemia, asthma. Pregnancy complicated by gHTN and decreased FM.  Maternal labs include blood type 0+, HIV Ag/Ab nonreactive, RPR nonreactive, rubella immune, HBsAg negative, GBS + (received amp x 1 , first dose at 0600 on  no IAP). ROM at 0745 on  with clear fluids (ROM hours: 2 H ).  Baby emerged vigorous, crying, was w/d/s/s with APGARS of 7 /8 . Resuscitation included: tactile stimulation and bulb suction. Mom plans to initiate breastfeeding / formula feed, consents Hep B vaccine.  Highest maternal temp: 37.2 . EOS 0.11.    : 24  TOB: 0954  Weight: 3550g

## 2024-09-24 NOTE — OB PROVIDER DELIVERY SUMMARY - NSSELHIDDEN_OBGYN_ALL_OB_FT
[NS_DeliveryAttending1_OBGYN_ALL_OB_FT:[id number]=],[NS_DeliveryAssist1_OBGYN_ALL_OB_FT:[id number]]

## 2024-09-24 NOTE — OB RN DELIVERY SUMMARY - NSSELHIDDEN_OBGYN_ALL_OB_FT
[NS_DeliveryAttending1_OBGYN_ALL_OB_FT:[id number]=],[NS_DeliveryAssist1_OBGYN_ALL_OB_FT:[id number]==],[NS_DeliveryRN_OBGYN_ALL_OB_FT:[id number]=],[NS_CirculateRN2_OBGYN_ALL_OB_FT:[id number]=]

## 2024-09-24 NOTE — OB PROVIDER LABOR PROGRESS NOTE - NS_SUBJECTIVE/OBJECTIVE_OBGYN_ALL_OB_FT
Patient seen and examined for cervical change and plan of care    FHTs CAT 1 with moderate variability, contractions q 2 mins     CX: CB removed and cx 5/90/-3  AROM fpr clear fluid  37 weeks iol for GHTN  -will change to pitocin  -position for comfort  -pt in agreement to plan of care
patient seen a the bedside for rectal pressure and painful contractions
Patient seen and examined for placement of cooks cervical balloon.  VS  T(C): 36.8 (09-23-24 @ 22:14)  HR: 105 (09-23-24 @ 22:11)  BP: 121/60 (09-23-24 @ 22:11)  RR: 15 (09-23-24 @ 17:30)  SpO2: --

## 2024-09-24 NOTE — OB RN DELIVERY SUMMARY - NS_LABORCHARACTER_OBGYN_ALL_OB
----- Message from Davide Taylor sent at 3/6/2018  6:34 AM CST -----  Contact: Pt   Pt would like the nurse to give her a call back in regards to her Physical therapy .. Contact number [phone].  
Called and spoke with patient regarding getting another injection if possible by Dr. Stafford.  Patient would like for you to give her a call.  Please advise.  Thanks  
Spoke with patient. We discussed her pain today. She would like to finish physical therapy before proceeding with an injection. She will contact us at the end of the month.    
Induction of labor-AROM/Induction of labor-Medicinal/External electronic FM/Antibiotics in labor

## 2024-09-24 NOTE — OB RN DELIVERY SUMMARY - NS_BIRTHTRAUMAA_OBGYN_ALL_OB
Called [facility] staff, who states the Xeloda 500 mg Rx was not received 8/6/21.  Talked with Chelly Choi and read off the 8/6/21 Xeloda 500 mg Rx information, to be dispensed in addition to the Xeloda 150 mg Rx that Chelly verified was received.     None

## 2024-09-24 NOTE — OB PROVIDER LABOR PROGRESS NOTE - ASSESSMENT
@[facility] gHTN Cooks cervical balloon placed without incident  60cc's instilled in both uterine and vaginal balloons  Patient tolerated well  Cont cytotec  Cont EFM/TOCO  IV pain meds/epidural PRN    T. Salsone NP
Patient fully dilated    patient asking for 10 minutes before having to push    VE: 10/100/0        Discussed with MD Jb Mercedes

## 2024-09-24 NOTE — OB PROVIDER DELIVERY SUMMARY - NSPROCMANEUVERSA_OBGYN_ALL_OB
Suprapubic pressure/Trinh (Legs flexed back)/[employer]s Corkscrew Suprapubic pressure/Trinh (Legs flexed back)/Woods Corkscrew/Fundal pressure NOT applied

## 2024-09-24 NOTE — OB NEONATOLOGY/PEDIATRICIAN DELIVERY SUMMARY - BABY A: APGAR 5 MIN HEART RATE, DELIVERY
Patient's INR is supratherapeutic at 3.4.  Previous instructions were verified and followed.  Reports no bleeding issues.  Instructions given to take a lower dose of warfarin 3.75 mg today - only, then resume scheduled dose of 15 mg every Thursday, Saturday; and 7.5 mg on all other days per week.  Advised on bleeding precautions - ED, if needed.  Recheck on 11/05/2020 - PT/INR has been ordered in [place].  Dose calendar given and reviewed with patient.  Patient verbalized understanding.     (2) more than 100 beats/min

## 2024-09-24 NOTE — OB RN DELIVERY SUMMARY - NS_SEPSISRSKCALC_OBGYN_ALL_OB_FT
EOS calculated successfully. EOS Risk Factor: 0.5/1000 live births ([facility] national incidence); GA=37w1d; Temp=98.6; ROM=1.867; GBS='Positive'; Antibiotics='No antibiotics or any antibiotics < 2 hrs prior to birth'   EOS calculated successfully. EOS Risk Factor: 0.5/1000 live births ([facility] national incidence); GA=37w1d; Temp=98.6; ROM=1.867; GBS='Positive'; Antibiotics='GBS specific antibiotics > 2 hrs prior to birth'

## 2024-09-24 NOTE — OB PROVIDER DELIVERY SUMMARY - NSPROVIDERDELIVERYNOTE_OBGYN_ALL_OB_FT
c/b right Shoulder dystocia    After several pushes, head  of viable male infant in POLLY position  was delivered over intact perineum at 0946am.  After delivery of the head, gentle downward traction was applied, however, anterior left shoulder did not deliver for the next 20 seconds and shoulder dystocia was called at that time.  Trinh maneuver was then performed (maternal hips were flexed) with no resolution, followed by the suprapubic pressure (as the head was facing toward maternal right, pressure was applied right above symphysis pubis from maternal left toward the right side.  Perineum was then assessed and vaginal access to the posterior sacral hollow was found to be adequate, not necessitating episiotomy.  Since SD did not resolve at that time,  Woodscrew maneuvers were performed with success.      At this point, delivered, the rest of the body followed without difficulty at 0946am.  Total time from head to body delivery was less than one minute..  Cord was cut and clamped x 2, infant handed to awaiting Peds (which were called prior for SD).   Placenta was then delivered spontaneous/complete/intact with 3 VC , fundus firm with fundal massage.  30 U (units) Pit (Pitocin) given in 500ml bag, good hemostasis.      Intact perineum. EBL 117_cc.  Apgars 7 at 1, 8 at 5 min.  3550 g.   FHR: reassuring during the delivery, in the 120s for the last 30 seconds of shoulder dystocia. Gasses were sent, pending.  Anesthesia: epidural.    Infant inspected by peds: moving all extremities.  No SSX of brachial nerve palsy noted.  All of the above events and significance of SD communicated to the patient and her family after delivery. No risk factors were for SD were identified.  Labor curve was reviewed, wnl. Implications for future pregnancies reviewed,.   Pt verbalized understanding of above events.

## 2024-09-25 LAB
ALBUMIN SERPL ELPH-MCNC: 3.1 G/DL — LOW (ref 3.3–5)
ALP SERPL-CCNC: 210 U/L — HIGH (ref 40–120)
ALT FLD-CCNC: 32 U/L — SIGNIFICANT CHANGE UP (ref 4–33)
ANION GAP SERPL CALC-SCNC: 11 MMOL/L — SIGNIFICANT CHANGE UP (ref 7–14)
AST SERPL-CCNC: 24 U/L — SIGNIFICANT CHANGE UP (ref 4–32)
BASOPHILS # BLD AUTO: 0.05 K/UL — SIGNIFICANT CHANGE UP (ref 0–0.2)
BASOPHILS NFR BLD AUTO: 0.4 % — SIGNIFICANT CHANGE UP (ref 0–2)
BILIRUB SERPL-MCNC: <0.2 MG/DL — SIGNIFICANT CHANGE UP (ref 0.2–1.2)
BUN SERPL-MCNC: 6 MG/DL — LOW (ref 7–23)
CALCIUM SERPL-MCNC: 9.3 MG/DL — SIGNIFICANT CHANGE UP (ref 8.4–10.5)
CHLORIDE SERPL-SCNC: 108 MMOL/L — HIGH (ref 98–107)
CO2 SERPL-SCNC: 18 MMOL/L — LOW (ref 22–31)
CREAT SERPL-MCNC: 0.52 MG/DL — SIGNIFICANT CHANGE UP (ref 0.5–1.3)
EGFR: 123 ML/MIN/1.73M2 — SIGNIFICANT CHANGE UP
EOSINOPHIL # BLD AUTO: 0.17 K/UL — SIGNIFICANT CHANGE UP (ref 0–0.5)
EOSINOPHIL NFR BLD AUTO: 1.3 % — SIGNIFICANT CHANGE UP (ref 0–6)
GLUCOSE SERPL-MCNC: 91 MG/DL — SIGNIFICANT CHANGE UP (ref 70–99)
HCT VFR BLD CALC: 36 % — SIGNIFICANT CHANGE UP (ref 34.5–45)
HGB BLD-MCNC: 11.7 G/DL — SIGNIFICANT CHANGE UP (ref 11.5–15.5)
IANC: 9.58 K/UL — HIGH (ref 1.8–7.4)
IMM GRANULOCYTES NFR BLD AUTO: 0.8 % — SIGNIFICANT CHANGE UP (ref 0–0.9)
LDH SERPL L TO P-CCNC: 207 U/L — SIGNIFICANT CHANGE UP (ref 135–225)
LYMPHOCYTES # BLD AUTO: 16.6 % — SIGNIFICANT CHANGE UP (ref 13–44)
LYMPHOCYTES # BLD AUTO: 2.18 K/UL — SIGNIFICANT CHANGE UP (ref 1–3.3)
MCHC RBC-ENTMCNC: 29.4 PG — SIGNIFICANT CHANGE UP (ref 27–34)
MCHC RBC-ENTMCNC: 32.5 GM/DL — SIGNIFICANT CHANGE UP (ref 32–36)
MCV RBC AUTO: 90.5 FL — SIGNIFICANT CHANGE UP (ref 80–100)
MONOCYTES # BLD AUTO: 1.03 K/UL — HIGH (ref 0–0.9)
MONOCYTES NFR BLD AUTO: 7.9 % — SIGNIFICANT CHANGE UP (ref 2–14)
NEUTROPHILS # BLD AUTO: 9.58 K/UL — HIGH (ref 1.8–7.4)
NEUTROPHILS NFR BLD AUTO: 73 % — SIGNIFICANT CHANGE UP (ref 43–77)
NRBC # BLD: 0 /100 WBCS — SIGNIFICANT CHANGE UP (ref 0–0)
NRBC # FLD: 0 K/UL — SIGNIFICANT CHANGE UP (ref 0–0)
PLATELET # BLD AUTO: 229 K/UL — SIGNIFICANT CHANGE UP (ref 150–400)
POTASSIUM SERPL-MCNC: 3.9 MMOL/L — SIGNIFICANT CHANGE UP (ref 3.5–5.3)
POTASSIUM SERPL-SCNC: 3.9 MMOL/L — SIGNIFICANT CHANGE UP (ref 3.5–5.3)
PROT SERPL-MCNC: 5.7 G/DL — LOW (ref 6–8.3)
RBC # BLD: 3.98 M/UL — SIGNIFICANT CHANGE UP (ref 3.8–5.2)
RBC # FLD: 13.2 % — SIGNIFICANT CHANGE UP (ref 10.3–14.5)
SODIUM SERPL-SCNC: 137 MMOL/L — SIGNIFICANT CHANGE UP (ref 135–145)
URATE SERPL-MCNC: 6 MG/DL — SIGNIFICANT CHANGE UP (ref 2.5–7)
WBC # BLD: 13.12 K/UL — HIGH (ref 3.8–10.5)
WBC # FLD AUTO: 13.12 K/UL — HIGH (ref 3.8–10.5)

## 2024-09-25 RX ADMIN — Medication 975 MILLIGRAM(S): at 20:58

## 2024-09-25 RX ADMIN — Medication 3 MILLILITER(S): at 21:28

## 2024-09-25 RX ADMIN — Medication 975 MILLIGRAM(S): at 16:10

## 2024-09-25 RX ADMIN — Medication 975 MILLIGRAM(S): at 06:09

## 2024-09-25 RX ADMIN — Medication 3 MILLILITER(S): at 14:27

## 2024-09-25 RX ADMIN — Medication 1 TABLET(S): at 12:29

## 2024-09-25 RX ADMIN — Medication 3 MILLILITER(S): at 06:09

## 2024-09-25 RX ADMIN — Medication 975 MILLIGRAM(S): at 15:40

## 2024-09-25 RX ADMIN — Medication 30 MILLIGRAM(S): at 12:29

## 2024-09-25 RX ADMIN — Medication 975 MILLIGRAM(S): at 06:39

## 2024-09-25 RX ADMIN — Medication 975 MILLIGRAM(S): at 09:44

## 2024-09-25 RX ADMIN — Medication 975 MILLIGRAM(S): at 10:15

## 2024-09-25 RX ADMIN — Medication 975 MILLIGRAM(S): at 21:28

## 2024-09-26 LAB
ALBUMIN SERPL ELPH-MCNC: 3.4 G/DL — SIGNIFICANT CHANGE UP (ref 3.3–5)
ALP SERPL-CCNC: 206 U/L — HIGH (ref 40–120)
ALT FLD-CCNC: 32 U/L — SIGNIFICANT CHANGE UP (ref 4–33)
ANION GAP SERPL CALC-SCNC: 12 MMOL/L — SIGNIFICANT CHANGE UP (ref 7–14)
AST SERPL-CCNC: 21 U/L — SIGNIFICANT CHANGE UP (ref 4–32)
BASOPHILS # BLD AUTO: 0.05 K/UL — SIGNIFICANT CHANGE UP (ref 0–0.2)
BASOPHILS NFR BLD AUTO: 0.4 % — SIGNIFICANT CHANGE UP (ref 0–2)
BILIRUB SERPL-MCNC: 0.2 MG/DL — SIGNIFICANT CHANGE UP (ref 0.2–1.2)
BUN SERPL-MCNC: 6 MG/DL — LOW (ref 7–23)
CALCIUM SERPL-MCNC: 9.8 MG/DL — SIGNIFICANT CHANGE UP (ref 8.4–10.5)
CHLORIDE SERPL-SCNC: 107 MMOL/L — SIGNIFICANT CHANGE UP (ref 98–107)
CO2 SERPL-SCNC: 22 MMOL/L — SIGNIFICANT CHANGE UP (ref 22–31)
CREAT SERPL-MCNC: 0.53 MG/DL — SIGNIFICANT CHANGE UP (ref 0.5–1.3)
EGFR: 123 ML/MIN/1.73M2 — SIGNIFICANT CHANGE UP
EOSINOPHIL # BLD AUTO: 0.27 K/UL — SIGNIFICANT CHANGE UP (ref 0–0.5)
EOSINOPHIL NFR BLD AUTO: 2.2 % — SIGNIFICANT CHANGE UP (ref 0–6)
GLUCOSE SERPL-MCNC: 78 MG/DL — SIGNIFICANT CHANGE UP (ref 70–99)
HCT VFR BLD CALC: 33.3 % — LOW (ref 34.5–45)
HGB BLD-MCNC: 11.3 G/DL — LOW (ref 11.5–15.5)
IANC: 8.64 K/UL — HIGH (ref 1.8–7.4)
IMM GRANULOCYTES NFR BLD AUTO: 1.9 % — HIGH (ref 0–0.9)
LDH SERPL L TO P-CCNC: 202 U/L — SIGNIFICANT CHANGE UP (ref 135–225)
LYMPHOCYTES # BLD AUTO: 18.6 % — SIGNIFICANT CHANGE UP (ref 13–44)
LYMPHOCYTES # BLD AUTO: 2.31 K/UL — SIGNIFICANT CHANGE UP (ref 1–3.3)
MAGNESIUM SERPL-MCNC: 4.1 MG/DL — HIGH (ref 1.6–2.6)
MCHC RBC-ENTMCNC: 29.1 PG — SIGNIFICANT CHANGE UP (ref 27–34)
MCHC RBC-ENTMCNC: 33.9 GM/DL — SIGNIFICANT CHANGE UP (ref 32–36)
MCV RBC AUTO: 85.8 FL — SIGNIFICANT CHANGE UP (ref 80–100)
MONOCYTES # BLD AUTO: 0.89 K/UL — SIGNIFICANT CHANGE UP (ref 0–0.9)
MONOCYTES NFR BLD AUTO: 7.2 % — SIGNIFICANT CHANGE UP (ref 2–14)
NEUTROPHILS # BLD AUTO: 8.64 K/UL — HIGH (ref 1.8–7.4)
NEUTROPHILS NFR BLD AUTO: 69.7 % — SIGNIFICANT CHANGE UP (ref 43–77)
NRBC # BLD: 0 /100 WBCS — SIGNIFICANT CHANGE UP (ref 0–0)
NRBC # FLD: 0 K/UL — SIGNIFICANT CHANGE UP (ref 0–0)
PLATELET # BLD AUTO: 243 K/UL — SIGNIFICANT CHANGE UP (ref 150–400)
POTASSIUM SERPL-MCNC: 3.8 MMOL/L — SIGNIFICANT CHANGE UP (ref 3.5–5.3)
POTASSIUM SERPL-SCNC: 3.8 MMOL/L — SIGNIFICANT CHANGE UP (ref 3.5–5.3)
PROT SERPL-MCNC: 6.5 G/DL — SIGNIFICANT CHANGE UP (ref 6–8.3)
RBC # BLD: 3.88 M/UL — SIGNIFICANT CHANGE UP (ref 3.8–5.2)
RBC # FLD: 13.2 % — SIGNIFICANT CHANGE UP (ref 10.3–14.5)
SODIUM SERPL-SCNC: 141 MMOL/L — SIGNIFICANT CHANGE UP (ref 135–145)
URATE SERPL-MCNC: 6.7 MG/DL — SIGNIFICANT CHANGE UP (ref 2.5–7)
WBC # BLD: 12.4 K/UL — HIGH (ref 3.8–10.5)
WBC # FLD AUTO: 12.4 K/UL — HIGH (ref 3.8–10.5)

## 2024-09-26 RX ORDER — MAGNESIUM SULFATE 500 MG/ML
4 VIAL (ML) INJECTION ONCE
Refills: 0 | Status: COMPLETED | OUTPATIENT
Start: 2024-09-26 | End: 2024-09-26

## 2024-09-26 RX ORDER — SODIUM CHLORIDE IRRIG SOLUTION 0.9 %
1000 SOLUTION, IRRIGATION IRRIGATION
Refills: 0 | Status: DISCONTINUED | OUTPATIENT
Start: 2024-09-26 | End: 2024-09-29

## 2024-09-26 RX ORDER — MAGNESIUM SULFATE 500 MG/ML
2 VIAL (ML) INJECTION
Qty: 40 | Refills: 0 | Status: DISCONTINUED | OUTPATIENT
Start: 2024-09-26 | End: 2024-09-29

## 2024-09-26 RX ADMIN — Medication 3 MILLILITER(S): at 06:58

## 2024-09-26 RX ADMIN — Medication 975 MILLIGRAM(S): at 10:15

## 2024-09-26 RX ADMIN — Medication 30 MILLIGRAM(S): at 12:15

## 2024-09-26 RX ADMIN — Medication 975 MILLIGRAM(S): at 09:23

## 2024-09-26 RX ADMIN — Medication 50 GM/HR: at 12:08

## 2024-09-26 RX ADMIN — Medication 975 MILLIGRAM(S): at 04:23

## 2024-09-26 RX ADMIN — Medication 975 MILLIGRAM(S): at 21:30

## 2024-09-26 RX ADMIN — Medication 50 GM/HR: at 19:27

## 2024-09-26 RX ADMIN — Medication 600 MILLIGRAM(S): at 15:00

## 2024-09-26 RX ADMIN — Medication 975 MILLIGRAM(S): at 20:59

## 2024-09-26 RX ADMIN — Medication 10 MILLIGRAM(S): at 10:08

## 2024-09-26 RX ADMIN — Medication 600 MILLIGRAM(S): at 14:05

## 2024-09-26 RX ADMIN — Medication 3 MILLILITER(S): at 23:00

## 2024-09-26 RX ADMIN — Medication 975 MILLIGRAM(S): at 03:53

## 2024-09-26 RX ADMIN — Medication 300 GRAM(S): at 11:43

## 2024-09-27 ENCOUNTER — TRANSCRIPTION ENCOUNTER (OUTPATIENT)
Age: 34
End: 2024-09-27

## 2024-09-27 DIAGNOSIS — O14.10 SEVERE PRE-ECLAMPSIA, UNSPECIFIED TRIMESTER: ICD-10-CM

## 2024-09-27 LAB
MAGNESIUM SERPL-MCNC: 4.5 MG/DL — HIGH (ref 1.6–2.6)
MAGNESIUM SERPL-MCNC: 4.8 MG/DL — HIGH (ref 1.6–2.6)

## 2024-09-27 RX ADMIN — Medication 3 MILLILITER(S): at 14:00

## 2024-09-27 RX ADMIN — Medication 600 MILLIGRAM(S): at 00:10

## 2024-09-27 RX ADMIN — Medication 30 MILLIGRAM(S): at 12:41

## 2024-09-27 RX ADMIN — Medication 600 MILLIGRAM(S): at 06:59

## 2024-09-27 RX ADMIN — Medication 600 MILLIGRAM(S): at 00:40

## 2024-09-27 RX ADMIN — Medication 600 MILLIGRAM(S): at 18:47

## 2024-09-27 RX ADMIN — Medication 600 MILLIGRAM(S): at 06:29

## 2024-09-27 RX ADMIN — Medication 50 GM/HR: at 07:18

## 2024-09-27 RX ADMIN — Medication 600 MILLIGRAM(S): at 12:41

## 2024-09-27 RX ADMIN — Medication 3 MILLILITER(S): at 22:53

## 2024-09-27 RX ADMIN — Medication 600 MILLIGRAM(S): at 13:20

## 2024-09-27 RX ADMIN — Medication 600 MILLIGRAM(S): at 18:17

## 2024-09-27 RX ADMIN — Medication 1 TABLET(S): at 12:41

## 2024-09-27 NOTE — DISCHARGE NOTE OB - MEDICATION SUMMARY - MEDICATIONS TO TAKE
I will START or STAY ON the medications listed below when I get home from the hospital:    acetaminophen 325 mg oral tablet  -- 3 tab(s) by mouth every 8 hours as needed for  moderate pain  -- Indication: For for moderate pain    NIFEdipine 60 mg oral tablet, extended release  -- 1 tab(s) by mouth once a day  -- Indication: For PEC

## 2024-09-27 NOTE — DISCHARGE NOTE OB - PATIENT PORTAL LINK FT
You can access the FollowMyHealth Patient Portal offered by [facility] by registering at the following website: [email or web address]. By joining [facility]’s FollowMyHealth portal, you will also be able to view your health information using other applications (apps) compatible with our system.

## 2024-09-27 NOTE — DISCHARGE NOTE OB - ADDITIONAL INSTRUCTIONS
Follow up at [facility] in 3 days for BP monitoring  monitor BP @ home 3 times daily (morning/noon/night)  keep a strict blood pressure log and bring to the office 3 days after hospital discharge for review  if you have BP > 140/90 call the office  If you have a BP >160/100 go to the hospital  if you have headaches, vision changes, upper abdominal pain call the office

## 2024-09-27 NOTE — DISCHARGE NOTE OB - CARE PROVIDER_API CALL
Evelyne Redding  Obstetrics and Gynecology  [address] [place] [id number]  Phone: [phone]  Fax: [phone]  Follow Up Time:

## 2024-09-27 NOTE — DISCHARGE NOTE OB - CARE PLAN
Principal Discharge DX:	Spontaneous vaginal delivery  Assessment and plan of treatment:	routine PP recovery   1

## 2024-09-27 NOTE — DISCHARGE NOTE OB - NS MD DC FALL RISK RISK
For information on Fall & Injury Prevention, visit: [email or web address] OR  [email or web address] OR  https://www.cdc.gov/steadi/patient.html

## 2024-09-28 RX ADMIN — Medication 600 MILLIGRAM(S): at 18:07

## 2024-09-28 RX ADMIN — Medication 600 MILLIGRAM(S): at 13:30

## 2024-09-28 RX ADMIN — Medication 1 TABLET(S): at 12:30

## 2024-09-28 RX ADMIN — Medication 975 MILLIGRAM(S): at 06:58

## 2024-09-28 RX ADMIN — Medication 3 MILLILITER(S): at 06:59

## 2024-09-28 RX ADMIN — Medication 975 MILLIGRAM(S): at 06:15

## 2024-09-28 RX ADMIN — Medication 600 MILLIGRAM(S): at 19:03

## 2024-09-28 RX ADMIN — Medication 60 MILLIGRAM(S): at 12:30

## 2024-09-28 RX ADMIN — Medication 600 MILLIGRAM(S): at 12:30

## 2024-09-28 NOTE — PROVIDER CONTACT NOTE (OTHER) - ACTION/TREATMENT ORDERED:
Awaiting orders.
MD notified. MD will assess patient.
As per MD Wesley will tell day shift and to continue current plan of care

## 2024-09-28 NOTE — PROVIDER CONTACT NOTE (OTHER) - ASSESSMENT
Pt c/o consistent headache, denies blurry vision or epigastric pain. No c/o light headedness or dizziness.
Patient complaining of headache, leg swelling in both feet, and calf tenderness.
bp 151/80

## 2024-09-28 NOTE — PROVIDER CONTACT NOTE (OTHER) - BACKGROUND
Gest hypertension and pc 0.1  NSD 2019(PEC and was on mag)
pt sp  viable female on 
Hx of gestational HTN PCR 0.1. Pt had elevated BP yesterday, was started on Procardia 30mg.

## 2024-09-28 NOTE — PROVIDER CONTACT NOTE (OTHER) - REASON
Patient complaining of headache, leg swelling in both feet, and calf tenderness.
Elevated BP
evelated BPs

## 2024-09-28 NOTE — PROGRESS NOTE ADULT - ASSESSMENT
NP:  day 1 Progress Note:     Patient seen at bedside resting comfortably offers no current complaints. Ambulating and voiding without difficulty.  Passing flatus and tolerating regular diet.  Bonding well with .  Breastfeeding exclusively . Denies HA, CP, SOB, N/V/D, dizziness, palpitations,  worsening vaginal bleeding, or any other concerns.      Vital Signs Last 24 Hrs  T(C): 36.6 (25 Sep 2024 10:02), Max: 36.8 (24 Sep 2024 17:07)  T(F): 97.8 (25 Sep 2024 10:02), Max: 98.2 (24 Sep 2024 17:07)  HR: 87 (25 Sep 2024 10:02) (87 - 118)  BP: 137/93 (25 Sep 2024 10:02) (113/82 - 154/79)  BP(mean): --  RR: 18 (25 Sep 2024 10:02) (17 - 20)  SpO2: 100% (25 Sep 2024 10:02) (96% - 100%)    Parameters below as of 25 Sep 2024 10:02  Patient On (Oxygen Delivery Method): room air        Physical Exam:     Gen: A&Ox 3, NAD  Chest: CTA B/L  Cardiac: S1,S2; RRR  Breast: Soft, nontender, nonengorged  Abdomen: +BS, Soft, nontender, ND; Fundus firm below umbilicus  Gyn: mod lochia, intact/repaired  Ext: Nontender, DTRS 2+, no worsening edema                          10.7   11.60 )-----------( 242      ( 23 Sep 2024 17:04 )             31.9       A/P: 34yr old F PPD#1 s/p  2024 c/b GHTN     #GHTN   - Overnight BPs 130s-140s/80-90s  - HELLP Labs- WNL   - P/C ratio 0.1  - Pt c/o headache at top of her head.   - Resolution w/ Tylenol   - Repeat HELLP Labs- pending   - Pt to be started on Procardia 30XL Daily   - Pt has BP cuff     #PP  - Continue pain management  - Encourage OOB and ambulation  - Check CBC  - Continue current care    -d/w dr Kathleen Corcoran NP    
Patient seen at bedside resting comfortably offers no current complaints.  Ambulating and voiding without difficulty.  Passing flatus and tolerating regular diet.  both breast/bottle feeding.  Denies HA, CP, SOB, N/V/D, dizziness, palpitations, worsening abdominal pain, worsening vaginal bleeding, or any other concerns.      Vital Signs Last 24 Hrs  T(C): 36.3 (28 Sep 2024 09:11), Max: 37.1 (27 Sep 2024 15:08)  T(F): 97.4 (28 Sep 2024 09:11), Max: 98.7 (27 Sep 2024 15:08)  HR: 87 (28 Sep 2024 09:11) (87 - 98)  BP: 147/78 (28 Sep 2024 09:11) (104/80 - 151/80)  BP(mean): --  RR: 18 (28 Sep 2024 09:11) (18 - 19)  SpO2: 99% (28 Sep 2024 09:11) (98% - 100%)    Parameters below as of 28 Sep 2024 09:11  Patient On (Oxygen Delivery Method): room air        Physical Exam:     Gen: A&Ox 3, NAD  Chest: CTA B/L  Cardiac: S1,S2; RRR  Breast: Soft, nontender, nonengorged  Abdomen: +BS; Soft, nontender, ND; Fundus firm below umbilicus  Gyn: Min lochia  Ext: Nontender, DTRS 2+, no worsening edema                          11.3   12.40 )-----------( 243      ( 26 Sep 2024 11:00 )             33.3        A/P: 34yr old F PPD#4 s/p  2024 c/b Pec w/ SF     #PEC w/ SF  - Overnight BPs 140s-150s/80-90s  - S/P Mag sulfate for 24hrs   - HELLP Labs- WNL   - P/C ratio 0.1  - Procardia titrated up to 60XL Daily   - PEC resources provided   - Pt has BP cuff     #PP  - Continue pain management  - Encourage OOB and ambulation  - Check CBC  - Continue current   - Discharge planning     -d/w dr Kathleen Corcoran NP 
Assessment and Plan  PPD #3 s/p   Doing well, bonding with baby  PEC w/ SF  ·	magnesium sulfate x 24 hr  ·	continue Procardia 30 xl daily- rx sent to at home pharmacy through E[facility]  ·	PEC precautions reviewed  ·	pt has bp cuff at home  ·	pt to check bp's TID, keep logs and bring to each visit  ·	bp logs given to pt with instructions  Encourage ambulation.  PP & PPD Instructions reviewed.  Continue pain management  continue incentive spirometer  Plan discussed with dr. Collins

## 2024-09-29 VITALS
TEMPERATURE: 98 F | DIASTOLIC BLOOD PRESSURE: 79 MMHG | HEART RATE: 79 BPM | RESPIRATION RATE: 18 BRPM | SYSTOLIC BLOOD PRESSURE: 142 MMHG | OXYGEN SATURATION: 100 %

## 2024-09-29 RX ORDER — ALBUTEROL 90 MCG
2 AEROSOL (GRAM) INHALATION
Refills: 0 | DISCHARGE

## 2024-09-29 RX ORDER — ACETAMINOPHEN 325 MG
3 TABLET ORAL
Qty: 0 | Refills: 0 | DISCHARGE
Start: 2024-09-29

## 2024-09-29 RX ADMIN — Medication 600 MILLIGRAM(S): at 12:50

## 2024-09-29 RX ADMIN — Medication 60 MILLIGRAM(S): at 11:56

## 2024-09-29 RX ADMIN — Medication 975 MILLIGRAM(S): at 10:20

## 2024-09-29 RX ADMIN — Medication 1 TABLET(S): at 11:56

## 2024-09-29 RX ADMIN — Medication 600 MILLIGRAM(S): at 06:46

## 2024-09-29 RX ADMIN — Medication 600 MILLIGRAM(S): at 07:15

## 2024-09-29 RX ADMIN — Medication 975 MILLIGRAM(S): at 09:23

## 2024-09-29 RX ADMIN — Medication 600 MILLIGRAM(S): at 11:56

## 2024-09-29 NOTE — PROGRESS NOTE ADULT - SUBJECTIVE AND OBJECTIVE BOX
Post-partum Note,   She is a  34y woman who is now post-partum day: 5    Subjective:  The patient feels well.  She is ambulating.   She is tolerating regular diet.  She denies nausea and vomiting; denies fever.  She is voiding.  Her pain is controlled.  She reports normal postpartum bleeding.    Physical exam:    Vital Signs Last 24 Hrs  T(C): 36.8 (29 Sep 2024 09:09), Max: 36.9 (28 Sep 2024 22:48)  T(F): 98.2 (29 Sep 2024 09:09), Max: 98.5 (28 Sep 2024 22:48)  HR: 98 (29 Sep 2024 09:09) (86 - 100)  BP: 135/75 (29 Sep 2024 09:09) (112/58 - 139/84)  BP(mean): --  RR: 18 (29 Sep 2024 09:09) (17 - 19)  SpO2: 100% (29 Sep 2024 09:09) (99% - 100%)    Parameters below as of 29 Sep 2024 09:09  Patient On (Oxygen Delivery Method): room air        Gen: NAD  Breast: Soft, nontender, not engorged.  Abdomen: Soft, nontender, no distension , firm uterine fundus at umbilicus.  Pelvic: Normal lochia noted  Ext: No calf tenderness    LABS:      Rubella status:     Allergies    Aleve (Anaphylaxis)  Pepto-Bismol (Anaphylaxis)  apple (Pruritus)  peanuts (Anaphylaxis)    Intolerances      MEDICATIONS  (STANDING):  acetaminophen     Tablet .. 975 milliGRAM(s) Oral <User Schedule>  diphtheria/tetanus/pertussis (acellular) Vaccine (Adacel) 0.5 milliLiter(s) IntraMuscular once  ibuprofen  Tablet. 600 milliGRAM(s) Oral every 6 hours  lactated ringers. 1000 milliLiter(s) (50 mL/Hr) IV Continuous <Continuous>  magnesium sulfate Infusion 2 Gm/Hr (50 mL/Hr) IV Continuous <Continuous>  NIFEdipine XL 60 milliGRAM(s) Oral daily  prenatal multivitamin 1 Tablet(s) Oral daily  sodium chloride 0.9% lock flush 3 milliLiter(s) IV Push every 8 hours    MEDICATIONS  (PRN):  albuterol    90 MICROgram(s) HFA Inhaler 2 Puff(s) Inhalation every 6 hours PRN Shortness of Breath and/or Wheezing  benzocaine 20%/menthol 0.5% Spray 1 Spray(s) Topical every 6 hours PRN for Perineal discomfort  dibucaine 1% Ointment 1 Application(s) Topical every 6 hours PRN Perineal discomfort  diphenhydrAMINE 25 milliGRAM(s) Oral every 6 hours PRN Pruritus  hydrocortisone 1% Cream 1 Application(s) Topical every 6 hours PRN Moderate Pain (4-6)  lanolin Ointment 1 Application(s) Topical every 6 hours PRN nipple soreness  magnesium hydroxide Suspension 30 milliLiter(s) Oral two times a day PRN Constipation  oxyCODONE    IR 5 milliGRAM(s) Oral every 3 hours PRN Moderate to Severe Pain (4-10)  oxyCODONE    IR 5 milliGRAM(s) Oral once PRN Moderate to Severe Pain (4-10)  pramoxine 1%/zinc 5% Cream 1 Application(s) Topical every 4 hours PRN Moderate Pain (4-6)  simethicone 80 milliGRAM(s) Chew every 4 hours PRN Gas  witch hazel Pads 1 Application(s) Topical every 4 hours PRN Perineal discomfort        Assessment and Plan  A/P: 34yr old F PPD#5 s/p  2024 c/b Pec w/ SF     #PEC w/ SF. On Procardia 60  - Overnight BPs WNL  - S/P Mag sulfate for 24hrs   - HELLP Labs- WNL   - P/C ratio 0.1  - Cont PROCARDIA 60. Sent to home pharmacy through [facility] [facility]   - PEC resources provided   - BP cuff sent to home pharmacy through [employer] [facility]  - BP parameters reviewed  - Reviewed home PEC instructions       #PP  - Continue pain management  - Encourage OOB and ambulation  - Pp education materials provided  - BF encouraged    D/C home today  RTO 3 days to review BP logs    Plan reviewed with Dr. Thompson      
Post-partum Note,   She is a  34y woman who is now post-partum day:2    Subjective:  The patient feels well.  She is ambulating.   She is tolerating regular diet.  She denies nausea and vomiting; denies fever.  She is voiding.  Her pain is controlled.  She reports normal postpartum bleeding.  She is breastfeeding.  Pt is complaining of headache since yesterday. Improves with tylenol and then resumes.     Physical exam:    Vital Signs Last 24 Hrs  T(C): 37.1 (26 Sep 2024 09:43), Max: 37.1 (26 Sep 2024 09:43)  T(F): 98.7 (26 Sep 2024 09:43), Max: 98.7 (26 Sep 2024 09:43)  HR: 87 (26 Sep 2024 09:43) (70 - 99)  BP: 146/100 (26 Sep 2024 09:45) (115/60 - 147/103)  BP(mean): --  RR: 18 (26 Sep 2024 09:43) (17 - 18)  SpO2: 100% (26 Sep 2024 09:43) (99% - 100%)    Parameters below as of 26 Sep 2024 09:43  Patient On (Oxygen Delivery Method): room air        Gen: NAD  Breast: Soft, nontender, not engorged.  Abdomen: Soft, nontender, no distension , firm uterine fundus at umbilicus.  Pelvic: Normal lochia noted  Ext: No calf tenderness    LABS:                        11.7   13.12 )-----------( 229      ( 25 Sep 2024 11:45 )             36.0       Rubella status:     Allergies    Aleve (Anaphylaxis)  Pepto-Bismol (Anaphylaxis)  apple (Pruritus)  peanuts (Anaphylaxis)    Intolerances      MEDICATIONS  (STANDING):  acetaminophen     Tablet .. 975 milliGRAM(s) Oral <User Schedule>  diphtheria/tetanus/pertussis (acellular) Vaccine (Adacel) 0.5 milliLiter(s) IntraMuscular once  ibuprofen  Tablet. 600 milliGRAM(s) Oral every 6 hours  magnesium sulfate  IVPB 4 Gram(s) IV Intermittent once  magnesium sulfate Infusion 2 Gm/Hr (50 mL/Hr) IV Continuous <Continuous>  NIFEdipine XL 30 milliGRAM(s) Oral daily  prenatal multivitamin 1 Tablet(s) Oral daily  sodium chloride 0.9% lock flush 3 milliLiter(s) IV Push every 8 hours    MEDICATIONS  (PRN):  albuterol    90 MICROgram(s) HFA Inhaler 2 Puff(s) Inhalation every 6 hours PRN Shortness of Breath and/or Wheezing  benzocaine 20%/menthol 0.5% Spray 1 Spray(s) Topical every 6 hours PRN for Perineal discomfort  dibucaine 1% Ointment 1 Application(s) Topical every 6 hours PRN Perineal discomfort  diphenhydrAMINE 25 milliGRAM(s) Oral every 6 hours PRN Pruritus  hydrocortisone 1% Cream 1 Application(s) Topical every 6 hours PRN Moderate Pain (4-6)  lanolin Ointment 1 Application(s) Topical every 6 hours PRN nipple soreness  magnesium hydroxide Suspension 30 milliLiter(s) Oral two times a day PRN Constipation  oxyCODONE    IR 5 milliGRAM(s) Oral every 3 hours PRN Moderate to Severe Pain (4-10)  oxyCODONE    IR 5 milliGRAM(s) Oral once PRN Moderate to Severe Pain (4-10)  pramoxine 1%/zinc 5% Cream 1 Application(s) Topical every 4 hours PRN Moderate Pain (4-6)  simethicone 80 milliGRAM(s) Chew every 4 hours PRN Gas  witch hazel Pads 1 Application(s) Topical every 4 hours PRN Perineal discomfort        Assessment and Plan  A/P: 34yr old F PPD#2 s/p  2024 c/b new diagnosis of PEC with SF    #PEC  - BP: 146/100 (26 Sep 2024 09:45) (115/60 - 147/103)  - HELLP Labs ordered STAT  - P/C ratio 0.1  - Pt c/o headache at top of her head.   - Procardia 10 IR  -Begin MAG x 24 hours   - Pt to continue on Procardia 30XL Daily as per Dr. Navarrete  - Monitor BPs throughout day, will consider increasing dose to Procardia 60    #PP  - Continue pain management  - Encourage OOB and ambulation  - Breastfeeding encouraged  - PP education materials provided      Plan d/w dr Wolfgang Trinidad PA-C    
Post-partum Note,   She is a  34y woman who is now post-partum day: 3    Subjective:  The patient feels well.  She is ambulating.   She is tolerating regular diet.  She denies nausea and vomiting; denies fever.  She is voiding.  Her pain is controlled.  She reports normal postpartum bleeding.  She is breastfeeding and formula feeding.  She denies HA, blurry vision, epigastric pain, SOB, CP, dizziness or lightheadedness    Physical exam:    Vital Signs Last 24 Hrs  T(C): 36.7 (27 Sep 2024 10:00), Max: 36.8 (26 Sep 2024 20:00)  T(F): 98 (27 Sep 2024 10:00), Max: 98.2 (26 Sep 2024 20:00)  HR: 88 (27 Sep 2024 10:00) (88 - 113)  BP: 137/78 (27 Sep 2024 10:00) (113/58 - 151/81)  BP(mean): 99 (26 Sep 2024 12:05) (86 - 99)  RR: 18 (27 Sep 2024 10:00) (17 - 20)  SpO2: 98% (27 Sep 2024 10:00) (97% - 100%)    Parameters below as of 27 Sep 2024 04:00  Patient On (Oxygen Delivery Method): room air        Gen: NAD  Breast: Soft, nontender, not engorged.  Abdomen: Soft, nontender, no distension , firm uterine fundus at umbilicus.  Pelvic: Normal lochia noted  Ext: No calf tenderness    LABS:                        11.3   12.40 )-----------( 243      ( 26 Sep 2024 11:00 )             33.3         Allergies    Aleve (Anaphylaxis)  Pepto-Bismol (Anaphylaxis)  apple (Pruritus)  peanuts (Anaphylaxis)      MEDICATIONS  (STANDING):  acetaminophen     Tablet .. 975 milliGRAM(s) Oral <User Schedule>  diphtheria/tetanus/pertussis (acellular) Vaccine (Adacel) 0.5 milliLiter(s) IntraMuscular once  ibuprofen  Tablet. 600 milliGRAM(s) Oral every 6 hours  lactated ringers. 1000 milliLiter(s) (50 mL/Hr) IV Continuous <Continuous>  magnesium sulfate Infusion 2 Gm/Hr (50 mL/Hr) IV Continuous <Continuous>  NIFEdipine XL 30 milliGRAM(s) Oral daily  prenatal multivitamin 1 Tablet(s) Oral daily  sodium chloride 0.9% lock flush 3 milliLiter(s) IV Push every 8 hours    MEDICATIONS  (PRN):  albuterol    90 MICROgram(s) HFA Inhaler 2 Puff(s) Inhalation every 6 hours PRN Shortness of Breath and/or Wheezing  benzocaine 20%/menthol 0.5% Spray 1 Spray(s) Topical every 6 hours PRN for Perineal discomfort  dibucaine 1% Ointment 1 Application(s) Topical every 6 hours PRN Perineal discomfort  diphenhydrAMINE 25 milliGRAM(s) Oral every 6 hours PRN Pruritus  hydrocortisone 1% Cream 1 Application(s) Topical every 6 hours PRN Moderate Pain (4-6)  lanolin Ointment 1 Application(s) Topical every 6 hours PRN nipple soreness  magnesium hydroxide Suspension 30 milliLiter(s) Oral two times a day PRN Constipation  oxyCODONE    IR 5 milliGRAM(s) Oral every 3 hours PRN Moderate to Severe Pain (4-10)  oxyCODONE    IR 5 milliGRAM(s) Oral once PRN Moderate to Severe Pain (4-10)  pramoxine 1%/zinc 5% Cream 1 Application(s) Topical every 4 hours PRN Moderate Pain (4-6)  simethicone 80 milliGRAM(s) Chew every 4 hours PRN Gas  witch hazel Pads 1 Application(s) Topical every 4 hours PRN Perineal discomfort

## 2024-10-21 PROBLEM — B97.7 PAPILLOMAVIRUS AS THE CAUSE OF DISEASES CLASSIFIED ELSEWHERE: Chronic | Status: ACTIVE | Noted: 2024-09-23

## 2024-11-22 NOTE — DISCHARGE NOTE OB - FOUL SMELLING VAGINAL DISCHARGE
Patient thought she was supposed to have an ultrasound prior to her next follow up with [facility] and just wanted to see if that needed to be ordered.   
Routing to [facility] to see if she wants this done there is no order in. She entered the order I called the pt and let her know they would call to schedule.  
Statement Selected

## 2024-11-26 ENCOUNTER — APPOINTMENT (OUTPATIENT)
Dept: CARDIOLOGY | Facility: CLINIC | Age: 34
End: 2024-11-26

## 2024-12-23 NOTE — OB RN TRIAGE NOTE - NS_TRIAGEADDITIONAL COMMENTS_OBGYN_ALL_OB_FT
ht 5ft 3.5  wt 261.2

## 2025-02-02 NOTE — OB RN TRIAGE NOTE - AVIAN FLU WORK
Care Due:                  Date            Visit Type   Department     Provider  --------------------------------------------------------------------------------                                EP -                              PRIMARY      [facility] INTERNAL  Last Visit: 11-      CARE ([facility])   ZOFIA Alexander                              EP -                              PRIMARY      NOM INTERNAL  Next Visit: 05-      CARE ([facility])   ZOFIA Alexander                                                            Last  Test          Frequency    Reason                     Performed    Due Date  --------------------------------------------------------------------------------    CMP.........  12 months..  pravastatin..............  05- 05-    [facility] Embedded Care Due Messages. Reference number: [id number].   2/02/2025 7:01:44 AM CST   No

## 2025-04-02 PROBLEM — Z34.03 ENCOUNTER FOR PRENATAL CARE IN THIRD TRIMESTER OF FIRST PREGNANCY: Status: RESOLVED | Noted: 2018-12-09 | Resolved: 2025-04-02

## 2025-04-02 PROBLEM — Z34.01 ENCOUNTER FOR PRENATAL CARE IN FIRST TRIMESTER OF FIRST PREGNANCY: Status: RESOLVED | Noted: 2018-07-20 | Resolved: 2025-04-02

## 2025-04-02 PROBLEM — Z34.03 ENCOUNTER FOR PRENATAL CARE IN THIRD TRIMESTER OF FIRST PREGNANCY: Status: RESOLVED | Noted: 2018-07-20 | Resolved: 2025-04-02

## 2025-04-02 PROBLEM — Z34.02 ENCOUNTER FOR PRENATAL CARE IN SECOND TRIMESTER OF FIRST PREGNANCY: Status: RESOLVED | Noted: 2018-07-20 | Resolved: 2025-04-02

## 2025-05-08 ENCOUNTER — NON-APPOINTMENT (OUTPATIENT)
Age: 35
End: 2025-05-08